# Patient Record
Sex: FEMALE | Race: WHITE | Employment: OTHER | URBAN - METROPOLITAN AREA
[De-identification: names, ages, dates, MRNs, and addresses within clinical notes are randomized per-mention and may not be internally consistent; named-entity substitution may affect disease eponyms.]

---

## 2020-01-01 ENCOUNTER — HOSPITAL ENCOUNTER (EMERGENCY)
Age: 70
Discharge: HOME | End: 2020-09-10
Attending: EMERGENCY MEDICINE
Payer: MEDICARE

## 2020-01-01 ENCOUNTER — TELEPHONE (OUTPATIENT)
Dept: CARDIOLOGY CLINIC | Age: 70
End: 2020-01-01

## 2020-01-01 ENCOUNTER — APPOINTMENT (OUTPATIENT)
Dept: GENERAL RADIOLOGY | Age: 70
DRG: 176 | End: 2020-01-01
Payer: MEDICARE

## 2020-01-01 ENCOUNTER — HOSPITAL ENCOUNTER (INPATIENT)
Age: 70
LOS: 4 days | Discharge: NURSING, REHAB OR LONG-TERM CARE | DRG: 377 | End: 2020-11-28
Attending: EMERGENCY MEDICINE | Admitting: NEUROMUSCULOSKELETAL MEDICINE & OMM
Payer: MEDICARE

## 2020-01-01 ENCOUNTER — APPOINTMENT (OUTPATIENT)
Dept: GENERAL RADIOLOGY | Age: 70
DRG: 872 | End: 2020-01-01
Payer: MEDICARE

## 2020-01-01 ENCOUNTER — APPOINTMENT (OUTPATIENT)
Dept: ULTRASOUND IMAGING | Age: 70
DRG: 377 | End: 2020-01-01
Payer: MEDICARE

## 2020-01-01 ENCOUNTER — HOSPITAL ENCOUNTER (EMERGENCY)
Age: 70
Discharge: HOME | End: 2020-12-13
Attending: EMERGENCY MEDICINE
Payer: MEDICARE

## 2020-01-01 ENCOUNTER — HOSPITAL ENCOUNTER (INPATIENT)
Age: 70
LOS: 6 days | Discharge: NURSING, REHAB OR LONG-TERM CARE | DRG: 872 | End: 2020-11-10
Attending: EMERGENCY MEDICINE | Admitting: NEUROMUSCULOSKELETAL MEDICINE & OMM
Payer: MEDICARE

## 2020-01-01 ENCOUNTER — APPOINTMENT (OUTPATIENT)
Dept: CT IMAGING | Age: 70
DRG: 176 | End: 2020-01-01
Payer: MEDICARE

## 2020-01-01 ENCOUNTER — APPOINTMENT (OUTPATIENT)
Dept: CT IMAGING | Age: 70
DRG: 377 | End: 2020-01-01
Payer: MEDICARE

## 2020-01-01 ENCOUNTER — HOSPITAL ENCOUNTER (INPATIENT)
Age: 70
LOS: 6 days | Discharge: NURSING, REHAB OR LONG-TERM CARE | DRG: 176 | End: 2020-12-08
Attending: EMERGENCY MEDICINE | Admitting: NEUROMUSCULOSKELETAL MEDICINE & OMM
Payer: MEDICARE

## 2020-01-01 ENCOUNTER — APPOINTMENT (OUTPATIENT)
Dept: CT IMAGING | Age: 70
End: 2020-01-01
Payer: MEDICARE

## 2020-01-01 ENCOUNTER — HOSPITAL ENCOUNTER (EMERGENCY)
Age: 70
Discharge: HOME | End: 2020-12-17
Attending: EMERGENCY MEDICINE
Payer: MEDICARE

## 2020-01-01 ENCOUNTER — APPOINTMENT (OUTPATIENT)
Dept: GENERAL RADIOLOGY | Age: 70
End: 2020-01-01
Payer: MEDICARE

## 2020-01-01 ENCOUNTER — APPOINTMENT (OUTPATIENT)
Dept: CT IMAGING | Age: 70
DRG: 872 | End: 2020-01-01
Payer: MEDICARE

## 2020-01-01 ENCOUNTER — APPOINTMENT (OUTPATIENT)
Dept: GENERAL RADIOLOGY | Age: 70
DRG: 377 | End: 2020-01-01
Payer: MEDICARE

## 2020-01-01 VITALS
SYSTOLIC BLOOD PRESSURE: 139 MMHG | DIASTOLIC BLOOD PRESSURE: 60 MMHG | TEMPERATURE: 98.1 F | OXYGEN SATURATION: 97 % | RESPIRATION RATE: 19 BRPM | HEART RATE: 77 BPM

## 2020-01-01 VITALS
TEMPERATURE: 97.9 F | HEIGHT: 65 IN | DIASTOLIC BLOOD PRESSURE: 63 MMHG | BODY MASS INDEX: 37.49 KG/M2 | WEIGHT: 225 LBS | HEART RATE: 98 BPM | RESPIRATION RATE: 16 BRPM | SYSTOLIC BLOOD PRESSURE: 165 MMHG | OXYGEN SATURATION: 100 %

## 2020-01-01 VITALS
TEMPERATURE: 97.6 F | RESPIRATION RATE: 18 BRPM | HEIGHT: 65 IN | OXYGEN SATURATION: 94 % | BODY MASS INDEX: 35.65 KG/M2 | SYSTOLIC BLOOD PRESSURE: 146 MMHG | WEIGHT: 214 LBS | DIASTOLIC BLOOD PRESSURE: 63 MMHG | HEART RATE: 79 BPM

## 2020-01-01 VITALS
SYSTOLIC BLOOD PRESSURE: 134 MMHG | HEART RATE: 84 BPM | BODY MASS INDEX: 31.92 KG/M2 | OXYGEN SATURATION: 98 % | DIASTOLIC BLOOD PRESSURE: 62 MMHG | WEIGHT: 191.6 LBS | RESPIRATION RATE: 18 BRPM | HEIGHT: 65 IN | TEMPERATURE: 96.9 F

## 2020-01-01 VITALS
HEART RATE: 94 BPM | DIASTOLIC BLOOD PRESSURE: 65 MMHG | OXYGEN SATURATION: 98 % | SYSTOLIC BLOOD PRESSURE: 148 MMHG | RESPIRATION RATE: 22 BRPM | TEMPERATURE: 98.7 F

## 2020-01-01 VITALS
TEMPERATURE: 97.6 F | HEART RATE: 88 BPM | RESPIRATION RATE: 18 BRPM | BODY MASS INDEX: 32.09 KG/M2 | SYSTOLIC BLOOD PRESSURE: 118 MMHG | WEIGHT: 192.6 LBS | HEIGHT: 65 IN | OXYGEN SATURATION: 96 % | DIASTOLIC BLOOD PRESSURE: 58 MMHG

## 2020-01-01 LAB
ABO/RH: NORMAL
ACINETOBACTER BAUMANNII BY PCR: NOT DETECTED
ADENOVIRUS BY PCR: NOT DETECTED
ADENOVIRUS BY PCR: NOT DETECTED
ALBUMIN SERPL-MCNC: 2.1 G/DL (ref 3.5–5.2)
ALBUMIN SERPL-MCNC: 2.2 G/DL (ref 3.5–5.2)
ALBUMIN SERPL-MCNC: 2.4 G/DL (ref 3.5–5.2)
ALBUMIN SERPL-MCNC: 2.5 G/DL (ref 3.5–5.2)
ALBUMIN SERPL-MCNC: 2.6 G/DL (ref 3.5–5.2)
ALBUMIN SERPL-MCNC: 2.6 G/DL (ref 3.5–5.2)
ALBUMIN SERPL-MCNC: 2.9 G/DL (ref 3.5–5.2)
ALBUMIN SERPL-MCNC: 3.4 G/DL (ref 3.5–5.2)
ALP BLD-CCNC: 106 U/L (ref 35–104)
ALP BLD-CCNC: 107 U/L (ref 35–104)
ALP BLD-CCNC: 127 U/L (ref 35–104)
ALP BLD-CCNC: 62 U/L (ref 35–104)
ALP BLD-CCNC: 62 U/L (ref 35–104)
ALP BLD-CCNC: 63 U/L (ref 35–104)
ALP BLD-CCNC: 65 U/L (ref 35–104)
ALP BLD-CCNC: 71 U/L (ref 35–104)
ALP BLD-CCNC: 73 U/L (ref 35–104)
ALP BLD-CCNC: 76 U/L (ref 35–104)
ALP BLD-CCNC: 77 U/L (ref 35–104)
ALP BLD-CCNC: 82 U/L (ref 35–104)
ALP BLD-CCNC: 86 U/L (ref 35–104)
ALP BLD-CCNC: 86 U/L (ref 35–104)
ALP BLD-CCNC: 91 U/L (ref 35–104)
ALP BLD-CCNC: 98 U/L (ref 35–104)
ALT SERPL-CCNC: 12 U/L (ref 0–32)
ALT SERPL-CCNC: 12 U/L (ref 0–32)
ALT SERPL-CCNC: 13 U/L (ref 0–32)
ALT SERPL-CCNC: 16 U/L (ref 0–32)
ALT SERPL-CCNC: 16 U/L (ref 0–32)
ALT SERPL-CCNC: 5 U/L (ref 0–32)
ALT SERPL-CCNC: 6 U/L (ref 0–32)
ALT SERPL-CCNC: 6 U/L (ref 0–32)
ALT SERPL-CCNC: <5 U/L (ref 0–32)
ANGLE (CLOT STRENGTH): 80.3 DEGREE (ref 59–74)
ANION GAP SERPL CALCULATED.3IONS-SCNC: 10 MMOL/L (ref 7–16)
ANION GAP SERPL CALCULATED.3IONS-SCNC: 11 MMOL/L (ref 7–16)
ANION GAP SERPL CALCULATED.3IONS-SCNC: 13 MMOL/L (ref 7–16)
ANION GAP SERPL CALCULATED.3IONS-SCNC: 13 MMOL/L (ref 7–16)
ANION GAP SERPL CALCULATED.3IONS-SCNC: 14 MMOL/L (ref 7–16)
ANION GAP SERPL CALCULATED.3IONS-SCNC: 15 MMOL/L (ref 7–16)
ANION GAP SERPL CALCULATED.3IONS-SCNC: 15 MMOL/L (ref 7–16)
ANION GAP SERPL CALCULATED.3IONS-SCNC: 17 MMOL/L (ref 7–16)
ANION GAP SERPL CALCULATED.3IONS-SCNC: 19 MMOL/L (ref 7–16)
ANION GAP SERPL CALCULATED.3IONS-SCNC: 25 MMOL/L (ref 7–16)
ANION GAP SERPL CALCULATED.3IONS-SCNC: 8 MMOL/L (ref 7–16)
ANION GAP SERPL CALCULATED.3IONS-SCNC: 9 MMOL/L (ref 7–16)
ANISOCYTOSIS: ABNORMAL
ANTIBODY SCREEN: NORMAL
APTT: 103.9 SEC (ref 24.5–35.1)
APTT: 22.4 SEC (ref 24.5–35.1)
APTT: 23.5 SEC (ref 24.5–35.1)
APTT: 23.7 SEC (ref 24.5–35.1)
APTT: 23.8 SEC (ref 24.5–35.1)
APTT: 24.6 SEC (ref 24.5–35.1)
APTT: 24.6 SEC (ref 24.5–35.1)
APTT: 24.8 SEC (ref 24.5–35.1)
APTT: 25.8 SEC (ref 24.5–35.1)
APTT: 25.9 SEC (ref 24.5–35.1)
APTT: 26 SEC (ref 24.5–35.1)
APTT: 30.7 SEC (ref 24.5–35.1)
APTT: 31.7 SEC (ref 24.5–35.1)
APTT: 61.2 SEC (ref 24.5–35.1)
APTT: 68.1 SEC (ref 24.5–35.1)
APTT: 89.4 SEC (ref 24.5–35.1)
APTT: <20 SEC (ref 24.5–35.1)
AST SERPL-CCNC: 13 U/L (ref 0–31)
AST SERPL-CCNC: 14 U/L (ref 0–31)
AST SERPL-CCNC: 17 U/L (ref 0–31)
AST SERPL-CCNC: 18 U/L (ref 0–31)
AST SERPL-CCNC: 19 U/L (ref 0–31)
AST SERPL-CCNC: 20 U/L (ref 0–31)
AST SERPL-CCNC: 20 U/L (ref 0–31)
AST SERPL-CCNC: 23 U/L (ref 0–31)
AST SERPL-CCNC: 30 U/L (ref 0–31)
AST SERPL-CCNC: 34 U/L (ref 0–31)
AST SERPL-CCNC: 66 U/L (ref 0–31)
BACTERIA: ABNORMAL /HPF
BASOPHILS ABSOLUTE: 0 E9/L (ref 0–0.2)
BASOPHILS ABSOLUTE: 0 E9/L (ref 0–0.2)
BASOPHILS ABSOLUTE: 0.01 E9/L (ref 0–0.2)
BASOPHILS ABSOLUTE: 0.02 E9/L (ref 0–0.2)
BASOPHILS ABSOLUTE: 0.03 E9/L (ref 0–0.2)
BASOPHILS ABSOLUTE: 0.04 E9/L (ref 0–0.2)
BASOPHILS ABSOLUTE: 0.06 E9/L (ref 0–0.2)
BASOPHILS ABSOLUTE: 0.07 E9/L (ref 0–0.2)
BASOPHILS RELATIVE PERCENT: 0.1 % (ref 0–2)
BASOPHILS RELATIVE PERCENT: 0.2 % (ref 0–2)
BASOPHILS RELATIVE PERCENT: 0.3 % (ref 0–2)
BASOPHILS RELATIVE PERCENT: 0.4 % (ref 0–2)
BASOPHILS RELATIVE PERCENT: 0.4 % (ref 0–2)
BASOPHILS RELATIVE PERCENT: 0.5 % (ref 0–2)
BASOPHILS RELATIVE PERCENT: 0.6 % (ref 0–2)
BASOPHILS RELATIVE PERCENT: 0.6 % (ref 0–2)
BETA-HYDROXYBUTYRATE: 0.16 MMOL/L (ref 0.02–0.27)
BETA-HYDROXYBUTYRATE: 3.45 MMOL/L (ref 0.02–0.27)
BILIRUB SERPL-MCNC: 0.2 MG/DL (ref 0–1.2)
BILIRUB SERPL-MCNC: 0.3 MG/DL (ref 0–1.2)
BILIRUB SERPL-MCNC: 0.3 MG/DL (ref 0–1.2)
BILIRUB SERPL-MCNC: 0.4 MG/DL (ref 0–1.2)
BILIRUB SERPL-MCNC: 0.5 MG/DL (ref 0–1.2)
BILIRUB SERPL-MCNC: <0.2 MG/DL (ref 0–1.2)
BILIRUBIN DIRECT: <0.2 MG/DL (ref 0–0.3)
BILIRUBIN URINE: NEGATIVE
BILIRUBIN, INDIRECT: ABNORMAL MG/DL (ref 0–1)
BLASTS RELATIVE PERCENT: 0.9 % (ref 0–0)
BLOOD BANK DISPENSE STATUS: NORMAL
BLOOD BANK PRODUCT CODE: NORMAL
BLOOD CULTURE, ROUTINE: ABNORMAL
BLOOD CULTURE, ROUTINE: NORMAL
BLOOD, URINE: ABNORMAL
BLOOD, URINE: ABNORMAL
BLOOD, URINE: NEGATIVE
BLOOD, URINE: NEGATIVE
BODY FLUID CULTURE, STERILE: NORMAL
BORDETELLA PARAPERTUSSIS BY PCR: NOT DETECTED
BORDETELLA PARAPERTUSSIS BY PCR: NOT DETECTED
BORDETELLA PERTUSSIS BY PCR: NOT DETECTED
BORDETELLA PERTUSSIS BY PCR: NOT DETECTED
BOTTLE TYPE: ABNORMAL
BPU ID: NORMAL
BUN BLDV-MCNC: 10 MG/DL (ref 8–23)
BUN BLDV-MCNC: 10 MG/DL (ref 8–23)
BUN BLDV-MCNC: 11 MG/DL (ref 8–23)
BUN BLDV-MCNC: 13 MG/DL (ref 8–23)
BUN BLDV-MCNC: 15 MG/DL (ref 8–23)
BUN BLDV-MCNC: 15 MG/DL (ref 8–23)
BUN BLDV-MCNC: 16 MG/DL (ref 8–23)
BUN BLDV-MCNC: 18 MG/DL (ref 8–23)
BUN BLDV-MCNC: 19 MG/DL (ref 8–23)
BUN BLDV-MCNC: 19 MG/DL (ref 8–23)
BUN BLDV-MCNC: 23 MG/DL (ref 8–23)
BUN BLDV-MCNC: 27 MG/DL (ref 8–23)
BUN BLDV-MCNC: 28 MG/DL (ref 8–23)
BUN BLDV-MCNC: 31 MG/DL (ref 8–23)
BUN BLDV-MCNC: 31 MG/DL (ref 8–23)
BUN BLDV-MCNC: 7 MG/DL (ref 8–23)
BUN BLDV-MCNC: 8 MG/DL (ref 8–23)
BUN BLDV-MCNC: 9 MG/DL (ref 8–23)
BURR CELLS: ABNORMAL
BURR CELLS: ABNORMAL
C-REACTIVE PROTEIN: 2.4 MG/DL (ref 0–0.4)
CALCIUM SERPL-MCNC: 7.6 MG/DL (ref 8.6–10.2)
CALCIUM SERPL-MCNC: 8 MG/DL (ref 8.6–10.2)
CALCIUM SERPL-MCNC: 8.1 MG/DL (ref 8.6–10.2)
CALCIUM SERPL-MCNC: 8.1 MG/DL (ref 8.6–10.2)
CALCIUM SERPL-MCNC: 8.3 MG/DL (ref 8.6–10.2)
CALCIUM SERPL-MCNC: 8.3 MG/DL (ref 8.6–10.2)
CALCIUM SERPL-MCNC: 8.4 MG/DL (ref 8.6–10.2)
CALCIUM SERPL-MCNC: 8.5 MG/DL (ref 8.6–10.2)
CALCIUM SERPL-MCNC: 8.5 MG/DL (ref 8.6–10.2)
CALCIUM SERPL-MCNC: 8.6 MG/DL (ref 8.6–10.2)
CALCIUM SERPL-MCNC: 8.8 MG/DL (ref 8.6–10.2)
CALCIUM SERPL-MCNC: 9 MG/DL (ref 8.6–10.2)
CALCIUM SERPL-MCNC: 9 MG/DL (ref 8.6–10.2)
CALCIUM SERPL-MCNC: 9.1 MG/DL (ref 8.6–10.2)
CALCIUM SERPL-MCNC: 9.1 MG/DL (ref 8.6–10.2)
CALCIUM SERPL-MCNC: 9.2 MG/DL (ref 8.6–10.2)
CALCIUM SERPL-MCNC: 9.2 MG/DL (ref 8.6–10.2)
CALCIUM SERPL-MCNC: 9.7 MG/DL (ref 8.6–10.2)
CANDIDA ALBICANS BY PCR: NOT DETECTED
CANDIDA GLABRATA BY PCR: NOT DETECTED
CANDIDA KRUSEI BY PCR: NOT DETECTED
CANDIDA PARAPSILOSIS BY PCR: NOT DETECTED
CANDIDA TROPICALIS BY PCR: NOT DETECTED
CHLAMYDOPHILIA PNEUMONIAE BY PCR: NOT DETECTED
CHLAMYDOPHILIA PNEUMONIAE BY PCR: NOT DETECTED
CHLORIDE BLD-SCNC: 100 MMOL/L (ref 98–107)
CHLORIDE BLD-SCNC: 101 MMOL/L (ref 98–107)
CHLORIDE BLD-SCNC: 103 MMOL/L (ref 98–107)
CHLORIDE BLD-SCNC: 104 MMOL/L (ref 98–107)
CHLORIDE BLD-SCNC: 104 MMOL/L (ref 98–107)
CHLORIDE BLD-SCNC: 105 MMOL/L (ref 98–107)
CHLORIDE BLD-SCNC: 105 MMOL/L (ref 98–107)
CHLORIDE BLD-SCNC: 106 MMOL/L (ref 98–107)
CHLORIDE BLD-SCNC: 107 MMOL/L (ref 98–107)
CHLORIDE BLD-SCNC: 112 MMOL/L (ref 98–107)
CHLORIDE BLD-SCNC: 92 MMOL/L (ref 98–107)
CHLORIDE BLD-SCNC: 99 MMOL/L (ref 98–107)
CHLORIDE URINE RANDOM: 32 MMOL/L
CHLORIDE URINE RANDOM: 41 MMOL/L
CHP ED QC CHECK: NORMAL
CHP ED QC CHECK: YES
CLARITY: ABNORMAL
CLARITY: CLEAR
CO2: 15 MMOL/L (ref 22–29)
CO2: 17 MMOL/L (ref 22–29)
CO2: 20 MMOL/L (ref 22–29)
CO2: 21 MMOL/L (ref 22–29)
CO2: 21 MMOL/L (ref 22–29)
CO2: 22 MMOL/L (ref 22–29)
CO2: 23 MMOL/L (ref 22–29)
CO2: 23 MMOL/L (ref 22–29)
CO2: 25 MMOL/L (ref 22–29)
CO2: 26 MMOL/L (ref 22–29)
CO2: 26 MMOL/L (ref 22–29)
CO2: 27 MMOL/L (ref 22–29)
CO2: 27 MMOL/L (ref 22–29)
CO2: 28 MMOL/L (ref 22–29)
CO2: 29 MMOL/L (ref 22–29)
CO2: 30 MMOL/L (ref 22–29)
COLOR: YELLOW
CORONAVIRUS 229E BY PCR: NOT DETECTED
CORONAVIRUS 229E BY PCR: NOT DETECTED
CORONAVIRUS HKU1 BY PCR: NOT DETECTED
CORONAVIRUS HKU1 BY PCR: NOT DETECTED
CORONAVIRUS NL63 BY PCR: NOT DETECTED
CORONAVIRUS NL63 BY PCR: NOT DETECTED
CORONAVIRUS OC43 BY PCR: NOT DETECTED
CORONAVIRUS OC43 BY PCR: NOT DETECTED
CREAT SERPL-MCNC: 0.5 MG/DL (ref 0.5–1)
CREAT SERPL-MCNC: 0.5 MG/DL (ref 0.5–1)
CREAT SERPL-MCNC: 0.6 MG/DL (ref 0.5–1)
CREAT SERPL-MCNC: 0.7 MG/DL (ref 0.5–1)
CREAT SERPL-MCNC: 0.8 MG/DL (ref 0.5–1)
CREAT SERPL-MCNC: 0.9 MG/DL (ref 0.5–1)
CREAT SERPL-MCNC: 1 MG/DL (ref 0.5–1)
CREAT SERPL-MCNC: 1.1 MG/DL (ref 0.5–1)
CREAT SERPL-MCNC: 1.2 MG/DL (ref 0.5–1)
CREATININE URINE: 21 MG/DL (ref 29–226)
CREATININE URINE: 83 MG/DL (ref 29–226)
CULTURE, BLOOD 2: ABNORMAL
CULTURE, BLOOD 2: NORMAL
D DIMER: 1419 NG/ML DDU
D DIMER: 1725 NG/ML DDU
D DIMER: 484 NG/ML DDU
D DIMER: 565 NG/ML DDU
D DIMER: 766 NG/ML DDU
DESCRIPTION BLOOD BANK: NORMAL
EKG ATRIAL RATE: 108 BPM
EKG ATRIAL RATE: 121 BPM
EKG ATRIAL RATE: 71 BPM
EKG ATRIAL RATE: 82 BPM
EKG ATRIAL RATE: 90 BPM
EKG P AXIS: 3 DEGREES
EKG P AXIS: 37 DEGREES
EKG P AXIS: 46 DEGREES
EKG P AXIS: 48 DEGREES
EKG P AXIS: 49 DEGREES
EKG P-R INTERVAL: 134 MS
EKG P-R INTERVAL: 136 MS
EKG P-R INTERVAL: 148 MS
EKG P-R INTERVAL: 154 MS
EKG P-R INTERVAL: 158 MS
EKG Q-T INTERVAL: 314 MS
EKG Q-T INTERVAL: 326 MS
EKG Q-T INTERVAL: 362 MS
EKG Q-T INTERVAL: 404 MS
EKG Q-T INTERVAL: 456 MS
EKG QRS DURATION: 66 MS
EKG QRS DURATION: 70 MS
EKG QRS DURATION: 70 MS
EKG QRS DURATION: 80 MS
EKG QRS DURATION: 80 MS
EKG QTC CALCULATION (BAZETT): 422 MS
EKG QTC CALCULATION (BAZETT): 436 MS
EKG QTC CALCULATION (BAZETT): 439 MS
EKG QTC CALCULATION (BAZETT): 445 MS
EKG QTC CALCULATION (BAZETT): 557 MS
EKG R AXIS: -15 DEGREES
EKG R AXIS: 0 DEGREES
EKG R AXIS: 11 DEGREES
EKG R AXIS: 29 DEGREES
EKG R AXIS: 9 DEGREES
EKG T AXIS: -61 DEGREES
EKG T AXIS: 120 DEGREES
EKG T AXIS: 37 DEGREES
EKG T AXIS: 51 DEGREES
EKG T AXIS: 89 DEGREES
EKG VENTRICULAR RATE: 108 BPM
EKG VENTRICULAR RATE: 121 BPM
EKG VENTRICULAR RATE: 71 BPM
EKG VENTRICULAR RATE: 82 BPM
EKG VENTRICULAR RATE: 90 BPM
ENTEROBACTER CLOACAE COMPLEX BY PCR: NOT DETECTED
ENTEROBACTERALES BY PCR: NOT DETECTED
ENTEROCOCCUS BY PCR: NOT DETECTED
EOSINOPHILS ABSOLUTE: 0.02 E9/L (ref 0.05–0.5)
EOSINOPHILS ABSOLUTE: 0.04 E9/L (ref 0.05–0.5)
EOSINOPHILS ABSOLUTE: 0.05 E9/L (ref 0.05–0.5)
EOSINOPHILS ABSOLUTE: 0.06 E9/L (ref 0.05–0.5)
EOSINOPHILS ABSOLUTE: 0.13 E9/L (ref 0.05–0.5)
EOSINOPHILS ABSOLUTE: 0.13 E9/L (ref 0.05–0.5)
EOSINOPHILS ABSOLUTE: 0.14 E9/L (ref 0.05–0.5)
EOSINOPHILS ABSOLUTE: 0.19 E9/L (ref 0.05–0.5)
EOSINOPHILS ABSOLUTE: 0.21 E9/L (ref 0.05–0.5)
EOSINOPHILS ABSOLUTE: 0.21 E9/L (ref 0.05–0.5)
EOSINOPHILS ABSOLUTE: 0.24 E9/L (ref 0.05–0.5)
EOSINOPHILS ABSOLUTE: 0.27 E9/L (ref 0.05–0.5)
EOSINOPHILS ABSOLUTE: 0.28 E9/L (ref 0.05–0.5)
EOSINOPHILS ABSOLUTE: 0.3 E9/L (ref 0.05–0.5)
EOSINOPHILS ABSOLUTE: 0.34 E9/L (ref 0.05–0.5)
EOSINOPHILS ABSOLUTE: 0.36 E9/L (ref 0.05–0.5)
EOSINOPHILS ABSOLUTE: 0.4 E9/L (ref 0.05–0.5)
EOSINOPHILS ABSOLUTE: 0.46 E9/L (ref 0.05–0.5)
EOSINOPHILS ABSOLUTE: 0.47 E9/L (ref 0.05–0.5)
EOSINOPHILS ABSOLUTE: 0.69 E9/L (ref 0.05–0.5)
EOSINOPHILS RELATIVE PERCENT: 0.2 % (ref 0–6)
EOSINOPHILS RELATIVE PERCENT: 0.2 % (ref 0–6)
EOSINOPHILS RELATIVE PERCENT: 0.4 % (ref 0–6)
EOSINOPHILS RELATIVE PERCENT: 0.5 % (ref 0–6)
EOSINOPHILS RELATIVE PERCENT: 0.9 % (ref 0–6)
EOSINOPHILS RELATIVE PERCENT: 0.9 % (ref 0–6)
EOSINOPHILS RELATIVE PERCENT: 1 % (ref 0–6)
EOSINOPHILS RELATIVE PERCENT: 1.2 % (ref 0–6)
EOSINOPHILS RELATIVE PERCENT: 1.9 % (ref 0–6)
EOSINOPHILS RELATIVE PERCENT: 2.3 % (ref 0–6)
EOSINOPHILS RELATIVE PERCENT: 2.4 % (ref 0–6)
EOSINOPHILS RELATIVE PERCENT: 2.4 % (ref 0–6)
EOSINOPHILS RELATIVE PERCENT: 2.8 % (ref 0–6)
EOSINOPHILS RELATIVE PERCENT: 3.2 % (ref 0–6)
EOSINOPHILS RELATIVE PERCENT: 3.2 % (ref 0–6)
EOSINOPHILS RELATIVE PERCENT: 3.8 % (ref 0–6)
EOSINOPHILS RELATIVE PERCENT: 3.9 % (ref 0–6)
EOSINOPHILS RELATIVE PERCENT: 4 % (ref 0–6)
EOSINOPHILS RELATIVE PERCENT: 4.1 % (ref 0–6)
EOSINOPHILS RELATIVE PERCENT: 4.4 % (ref 0–6)
EOSINOPHILS RELATIVE PERCENT: 4.8 % (ref 0–6)
EOSINOPHILS RELATIVE PERCENT: 6.2 % (ref 0–6)
EPITHELIAL CELLS, UA: ABNORMAL /HPF
EPL-TEG: 0 % (ref 0–15)
ESCHERICHIA COLI BY PCR: NOT DETECTED
FERRITIN: 774 NG/ML
FIBRINOGEN: 412 MG/DL (ref 225–540)
FIBRINOGEN: 438 MG/DL (ref 225–540)
FIBRINOGEN: 443 MG/DL (ref 225–540)
FIBRINOGEN: 450 MG/DL (ref 225–540)
FIBRINOGEN: 457 MG/DL (ref 225–540)
FLUID TYPE: NORMAL
G-TEG: 17.9 K D/SC (ref 4.5–11)
GFR AFRICAN AMERICAN: 53
GFR AFRICAN AMERICAN: 59
GFR AFRICAN AMERICAN: >60
GFR NON-AFRICAN AMERICAN: 44 ML/MIN/1.73
GFR NON-AFRICAN AMERICAN: 49 ML/MIN/1.73
GFR NON-AFRICAN AMERICAN: 55 ML/MIN/1.73
GFR NON-AFRICAN AMERICAN: >60 ML/MIN/1.73
GLUCOSE BLD-MCNC: 112 MG/DL (ref 74–99)
GLUCOSE BLD-MCNC: 116 MG/DL (ref 74–99)
GLUCOSE BLD-MCNC: 117 MG/DL (ref 74–99)
GLUCOSE BLD-MCNC: 125 MG/DL (ref 74–99)
GLUCOSE BLD-MCNC: 156 MG/DL (ref 74–99)
GLUCOSE BLD-MCNC: 161 MG/DL (ref 74–99)
GLUCOSE BLD-MCNC: 224 MG/DL (ref 74–99)
GLUCOSE BLD-MCNC: 244 MG/DL (ref 74–99)
GLUCOSE BLD-MCNC: 255 MG/DL (ref 74–99)
GLUCOSE BLD-MCNC: 278 MG/DL (ref 74–99)
GLUCOSE BLD-MCNC: 284 MG/DL (ref 74–99)
GLUCOSE BLD-MCNC: 299 MG/DL (ref 74–99)
GLUCOSE BLD-MCNC: 309 MG/DL (ref 74–99)
GLUCOSE BLD-MCNC: 314 MG/DL (ref 74–99)
GLUCOSE BLD-MCNC: 314 MG/DL (ref 74–99)
GLUCOSE BLD-MCNC: 325 MG/DL
GLUCOSE BLD-MCNC: 372 MG/DL (ref 74–99)
GLUCOSE BLD-MCNC: 398 MG/DL
GLUCOSE BLD-MCNC: 405 MG/DL (ref 74–99)
GLUCOSE BLD-MCNC: 414 MG/DL (ref 74–99)
GLUCOSE BLD-MCNC: 65 MG/DL (ref 74–99)
GLUCOSE BLD-MCNC: 98 MG/DL (ref 74–99)
GLUCOSE URINE: 250 MG/DL
GLUCOSE URINE: >=1000 MG/DL
GLUCOSE URINE: >=1000 MG/DL
GLUCOSE URINE: NEGATIVE MG/DL
GLUCOSE, FLUID: 304 MG/DL
GRAM STAIN ORDERABLE: NORMAL
GRAM STAIN RESULT: NORMAL
HAEMOPHILUS INFLUENZAE BY PCR: NOT DETECTED
HBA1C MFR BLD: 6.9 % (ref 4–5.6)
HBA1C MFR BLD: 9.4 % (ref 4–5.6)
HCT VFR BLD CALC: 23.1 % (ref 34–48)
HCT VFR BLD CALC: 23.2 % (ref 34–48)
HCT VFR BLD CALC: 23.3 % (ref 34–48)
HCT VFR BLD CALC: 23.5 % (ref 34–48)
HCT VFR BLD CALC: 23.6 % (ref 34–48)
HCT VFR BLD CALC: 23.7 % (ref 34–48)
HCT VFR BLD CALC: 23.8 % (ref 34–48)
HCT VFR BLD CALC: 24.2 % (ref 34–48)
HCT VFR BLD CALC: 24.3 % (ref 34–48)
HCT VFR BLD CALC: 24.4 % (ref 34–48)
HCT VFR BLD CALC: 24.4 % (ref 34–48)
HCT VFR BLD CALC: 24.5 % (ref 34–48)
HCT VFR BLD CALC: 24.6 % (ref 34–48)
HCT VFR BLD CALC: 24.9 % (ref 34–48)
HCT VFR BLD CALC: 25.1 % (ref 34–48)
HCT VFR BLD CALC: 25.1 % (ref 34–48)
HCT VFR BLD CALC: 25.7 % (ref 34–48)
HCT VFR BLD CALC: 26.1 % (ref 34–48)
HCT VFR BLD CALC: 26.2 % (ref 34–48)
HCT VFR BLD CALC: 26.7 % (ref 34–48)
HCT VFR BLD CALC: 27.2 % (ref 34–48)
HCT VFR BLD CALC: 28.1 % (ref 34–48)
HCT VFR BLD CALC: 28.2 % (ref 34–48)
HCT VFR BLD CALC: 31.3 % (ref 34–48)
HCT VFR BLD CALC: 32 % (ref 34–48)
HCT VFR BLD CALC: 32.9 % (ref 34–48)
HCT VFR BLD CALC: 33.1 % (ref 34–48)
HCT VFR BLD CALC: 33.1 % (ref 34–48)
HCT VFR BLD CALC: 33.5 % (ref 34–48)
HCT VFR BLD CALC: 36.6 % (ref 34–48)
HCT VFR BLD CALC: 37.9 % (ref 34–48)
HCT VFR BLD CALC: 42.6 % (ref 34–48)
HEMOGLOBIN: 10 G/DL (ref 11.5–15.5)
HEMOGLOBIN: 10.3 G/DL (ref 11.5–15.5)
HEMOGLOBIN: 11.5 G/DL (ref 11.5–15.5)
HEMOGLOBIN: 11.5 G/DL (ref 11.5–15.5)
HEMOGLOBIN: 12.6 G/DL (ref 11.5–15.5)
HEMOGLOBIN: 6.7 G/DL (ref 11.5–15.5)
HEMOGLOBIN: 6.9 G/DL (ref 11.5–15.5)
HEMOGLOBIN: 7.2 G/DL (ref 11.5–15.5)
HEMOGLOBIN: 7.3 G/DL (ref 11.5–15.5)
HEMOGLOBIN: 7.5 G/DL (ref 11.5–15.5)
HEMOGLOBIN: 7.6 G/DL (ref 11.5–15.5)
HEMOGLOBIN: 7.7 G/DL (ref 11.5–15.5)
HEMOGLOBIN: 7.7 G/DL (ref 11.5–15.5)
HEMOGLOBIN: 7.8 G/DL (ref 11.5–15.5)
HEMOGLOBIN: 7.9 G/DL (ref 11.5–15.5)
HEMOGLOBIN: 8 G/DL (ref 11.5–15.5)
HEMOGLOBIN: 8 G/DL (ref 11.5–15.5)
HEMOGLOBIN: 8.1 G/DL (ref 11.5–15.5)
HEMOGLOBIN: 8.2 G/DL (ref 11.5–15.5)
HEMOGLOBIN: 8.3 G/DL (ref 11.5–15.5)
HEMOGLOBIN: 8.3 G/DL (ref 11.5–15.5)
HEMOGLOBIN: 8.6 G/DL (ref 11.5–15.5)
HEMOGLOBIN: 8.8 G/DL (ref 11.5–15.5)
HEMOGLOBIN: 9.3 G/DL (ref 11.5–15.5)
HEMOGLOBIN: 9.4 G/DL (ref 11.5–15.5)
HEMOGLOBIN: 9.5 G/DL (ref 11.5–15.5)
HEMOGLOBIN: 9.9 G/DL (ref 11.5–15.5)
HUMAN METAPNEUMOVIRUS BY PCR: NOT DETECTED
HUMAN METAPNEUMOVIRUS BY PCR: NOT DETECTED
HUMAN RHINOVIRUS/ENTEROVIRUS BY PCR: NOT DETECTED
HUMAN RHINOVIRUS/ENTEROVIRUS BY PCR: NOT DETECTED
HYALINE CASTS: ABNORMAL /LPF (ref 0–2)
HYPOCHROMIA: ABNORMAL
HYPOCHROMIA: ABNORMAL
IMMATURE GRANULOCYTES #: 0.05 E9/L
IMMATURE GRANULOCYTES #: 0.09 E9/L
IMMATURE GRANULOCYTES #: 0.11 E9/L
IMMATURE GRANULOCYTES #: 0.12 E9/L
IMMATURE GRANULOCYTES #: 0.15 E9/L
IMMATURE GRANULOCYTES #: 0.16 E9/L
IMMATURE GRANULOCYTES #: 0.18 E9/L
IMMATURE GRANULOCYTES #: 0.18 E9/L
IMMATURE GRANULOCYTES #: 0.2 E9/L
IMMATURE GRANULOCYTES #: 0.2 E9/L
IMMATURE GRANULOCYTES #: 0.21 E9/L
IMMATURE GRANULOCYTES #: 0.22 E9/L
IMMATURE GRANULOCYTES #: 0.25 E9/L
IMMATURE GRANULOCYTES #: 0.26 E9/L
IMMATURE GRANULOCYTES #: 0.27 E9/L
IMMATURE GRANULOCYTES #: 0.3 E9/L
IMMATURE GRANULOCYTES #: 0.32 E9/L
IMMATURE GRANULOCYTES %: 0.4 % (ref 0–5)
IMMATURE GRANULOCYTES %: 0.8 % (ref 0–5)
IMMATURE GRANULOCYTES %: 0.9 % (ref 0–5)
IMMATURE GRANULOCYTES %: 0.9 % (ref 0–5)
IMMATURE GRANULOCYTES %: 1 % (ref 0–5)
IMMATURE GRANULOCYTES %: 1.4 % (ref 0–5)
IMMATURE GRANULOCYTES %: 1.5 % (ref 0–5)
IMMATURE GRANULOCYTES %: 1.7 % (ref 0–5)
IMMATURE GRANULOCYTES %: 1.8 % (ref 0–5)
IMMATURE GRANULOCYTES %: 1.8 % (ref 0–5)
IMMATURE GRANULOCYTES %: 2 % (ref 0–5)
IMMATURE GRANULOCYTES %: 2.1 % (ref 0–5)
IMMATURE GRANULOCYTES %: 2.3 % (ref 0–5)
IMMATURE GRANULOCYTES %: 2.3 % (ref 0–5)
IMMATURE GRANULOCYTES %: 2.6 % (ref 0–5)
IMMATURE GRANULOCYTES %: 3.6 % (ref 0–5)
IMMATURE GRANULOCYTES %: 4.1 % (ref 0–5)
INFLUENZA A BY PCR: NOT DETECTED
INFLUENZA A BY PCR: NOT DETECTED
INFLUENZA B BY PCR: NOT DETECTED
INFLUENZA B BY PCR: NOT DETECTED
INR BLD: 1
INR BLD: 1
INR BLD: 1.1
INR BLD: 1.1
INR BLD: 1.2
INR BLD: 2.4
INR BLD: 2.4
K (CLOTTING TIME): 0.8 MIN (ref 1–3)
KETONES, URINE: 40 MG/DL
KETONES, URINE: ABNORMAL MG/DL
KETONES, URINE: ABNORMAL MG/DL
KETONES, URINE: NEGATIVE MG/DL
KLEBSIELLA OXYTOCA BY PCR: NOT DETECTED
KLEBSIELLA PNEUMONIAE GROUP BY PCR: NOT DETECTED
LACTIC ACID, SEPSIS: 1.2 MMOL/L (ref 0.5–1.9)
LACTIC ACID: 1.8 MMOL/L (ref 0.5–2.2)
LACTIC ACID: 10.6 MMOL/L (ref 0.5–2.2)
LACTIC ACID: 3.1 MMOL/L (ref 0.5–2.2)
LACTIC ACID: 8 MMOL/L (ref 0.5–2.2)
LEUKOCYTE ESTERASE, URINE: ABNORMAL
LEUKOCYTE ESTERASE, URINE: NEGATIVE
LIPASE: 22 U/L (ref 13–60)
LISTERIA MONOCYTOGENES BY PCR: NOT DETECTED
LV EF: 60 %
LVEF MODALITY: NORMAL
LY30 (FIBRINOLYSIS): 0 % (ref 0–8)
LYMPHOCYTES ABSOLUTE: 0.8 E9/L (ref 1.5–4)
LYMPHOCYTES ABSOLUTE: 0.86 E9/L (ref 1.5–4)
LYMPHOCYTES ABSOLUTE: 0.98 E9/L (ref 1.5–4)
LYMPHOCYTES ABSOLUTE: 1.11 E9/L (ref 1.5–4)
LYMPHOCYTES ABSOLUTE: 1.12 E9/L (ref 1.5–4)
LYMPHOCYTES ABSOLUTE: 1.28 E9/L (ref 1.5–4)
LYMPHOCYTES ABSOLUTE: 1.34 E9/L (ref 1.5–4)
LYMPHOCYTES ABSOLUTE: 1.35 E9/L (ref 1.5–4)
LYMPHOCYTES ABSOLUTE: 1.37 E9/L (ref 1.5–4)
LYMPHOCYTES ABSOLUTE: 1.5 E9/L (ref 1.5–4)
LYMPHOCYTES ABSOLUTE: 1.53 E9/L (ref 1.5–4)
LYMPHOCYTES ABSOLUTE: 1.53 E9/L (ref 1.5–4)
LYMPHOCYTES ABSOLUTE: 1.54 E9/L (ref 1.5–4)
LYMPHOCYTES ABSOLUTE: 1.62 E9/L (ref 1.5–4)
LYMPHOCYTES ABSOLUTE: 1.69 E9/L (ref 1.5–4)
LYMPHOCYTES ABSOLUTE: 1.76 E9/L (ref 1.5–4)
LYMPHOCYTES ABSOLUTE: 1.78 E9/L (ref 1.5–4)
LYMPHOCYTES ABSOLUTE: 1.81 E9/L (ref 1.5–4)
LYMPHOCYTES ABSOLUTE: 1.84 E9/L (ref 1.5–4)
LYMPHOCYTES ABSOLUTE: 1.89 E9/L (ref 1.5–4)
LYMPHOCYTES ABSOLUTE: 1.94 E9/L (ref 1.5–4)
LYMPHOCYTES ABSOLUTE: 3.01 E9/L (ref 1.5–4)
LYMPHOCYTES RELATIVE PERCENT: 10.2 % (ref 20–42)
LYMPHOCYTES RELATIVE PERCENT: 11.2 % (ref 20–42)
LYMPHOCYTES RELATIVE PERCENT: 11.4 % (ref 20–42)
LYMPHOCYTES RELATIVE PERCENT: 11.4 % (ref 20–42)
LYMPHOCYTES RELATIVE PERCENT: 12.7 % (ref 20–42)
LYMPHOCYTES RELATIVE PERCENT: 14.3 % (ref 20–42)
LYMPHOCYTES RELATIVE PERCENT: 15.1 % (ref 20–42)
LYMPHOCYTES RELATIVE PERCENT: 15.2 % (ref 20–42)
LYMPHOCYTES RELATIVE PERCENT: 15.6 % (ref 20–42)
LYMPHOCYTES RELATIVE PERCENT: 16.3 % (ref 20–42)
LYMPHOCYTES RELATIVE PERCENT: 16.9 % (ref 20–42)
LYMPHOCYTES RELATIVE PERCENT: 17.8 % (ref 20–42)
LYMPHOCYTES RELATIVE PERCENT: 18.3 % (ref 20–42)
LYMPHOCYTES RELATIVE PERCENT: 18.5 % (ref 20–42)
LYMPHOCYTES RELATIVE PERCENT: 21.4 % (ref 20–42)
LYMPHOCYTES RELATIVE PERCENT: 24.1 % (ref 20–42)
LYMPHOCYTES RELATIVE PERCENT: 27.2 % (ref 20–42)
LYMPHOCYTES RELATIVE PERCENT: 6.1 % (ref 20–42)
LYMPHOCYTES RELATIVE PERCENT: 6.7 % (ref 20–42)
LYMPHOCYTES RELATIVE PERCENT: 7.2 % (ref 20–42)
LYMPHOCYTES RELATIVE PERCENT: 7.4 % (ref 20–42)
LYMPHOCYTES RELATIVE PERCENT: 9.1 % (ref 20–42)
MA (MAX AMPLITUDE): 78.1 MM (ref 50–70)
MAGNESIUM: 1.3 MG/DL (ref 1.6–2.6)
MAGNESIUM: 1.4 MG/DL (ref 1.6–2.6)
MAGNESIUM: 1.5 MG/DL (ref 1.6–2.6)
MAGNESIUM: 1.5 MG/DL (ref 1.6–2.6)
MAGNESIUM: 1.6 MG/DL (ref 1.6–2.6)
MAGNESIUM: 1.7 MG/DL (ref 1.6–2.6)
MAGNESIUM: 1.7 MG/DL (ref 1.6–2.6)
MAGNESIUM: 1.8 MG/DL (ref 1.6–2.6)
MCH RBC QN AUTO: 25.2 PG (ref 26–35)
MCH RBC QN AUTO: 25.3 PG (ref 26–35)
MCH RBC QN AUTO: 25.3 PG (ref 26–35)
MCH RBC QN AUTO: 25.5 PG (ref 26–35)
MCH RBC QN AUTO: 25.5 PG (ref 26–35)
MCH RBC QN AUTO: 25.6 PG (ref 26–35)
MCH RBC QN AUTO: 25.7 PG (ref 26–35)
MCH RBC QN AUTO: 25.9 PG (ref 26–35)
MCH RBC QN AUTO: 25.9 PG (ref 26–35)
MCH RBC QN AUTO: 26.4 PG (ref 26–35)
MCH RBC QN AUTO: 26.5 PG (ref 26–35)
MCH RBC QN AUTO: 27 PG (ref 26–35)
MCH RBC QN AUTO: 27.1 PG (ref 26–35)
MCH RBC QN AUTO: 27.3 PG (ref 26–35)
MCH RBC QN AUTO: 27.4 PG (ref 26–35)
MCH RBC QN AUTO: 27.6 PG (ref 26–35)
MCH RBC QN AUTO: 27.7 PG (ref 26–35)
MCH RBC QN AUTO: 27.9 PG (ref 26–35)
MCH RBC QN AUTO: 28.4 PG (ref 26–35)
MCHC RBC AUTO-ENTMCNC: 28.2 % (ref 32–34.5)
MCHC RBC AUTO-ENTMCNC: 28.8 % (ref 32–34.5)
MCHC RBC AUTO-ENTMCNC: 28.9 % (ref 32–34.5)
MCHC RBC AUTO-ENTMCNC: 29.1 % (ref 32–34.5)
MCHC RBC AUTO-ENTMCNC: 29.6 % (ref 32–34.5)
MCHC RBC AUTO-ENTMCNC: 29.9 % (ref 32–34.5)
MCHC RBC AUTO-ENTMCNC: 30 % (ref 32–34.5)
MCHC RBC AUTO-ENTMCNC: 30.1 % (ref 32–34.5)
MCHC RBC AUTO-ENTMCNC: 30.3 % (ref 32–34.5)
MCHC RBC AUTO-ENTMCNC: 30.5 % (ref 32–34.5)
MCHC RBC AUTO-ENTMCNC: 30.6 % (ref 32–34.5)
MCHC RBC AUTO-ENTMCNC: 30.7 % (ref 32–34.5)
MCHC RBC AUTO-ENTMCNC: 31 % (ref 32–34.5)
MCHC RBC AUTO-ENTMCNC: 31.1 % (ref 32–34.5)
MCHC RBC AUTO-ENTMCNC: 31.2 % (ref 32–34.5)
MCHC RBC AUTO-ENTMCNC: 31.4 % (ref 32–34.5)
MCHC RBC AUTO-ENTMCNC: 31.5 % (ref 32–34.5)
MCHC RBC AUTO-ENTMCNC: 31.6 % (ref 32–34.5)
MCHC RBC AUTO-ENTMCNC: 31.7 % (ref 32–34.5)
MCHC RBC AUTO-ENTMCNC: 31.8 % (ref 32–34.5)
MCHC RBC AUTO-ENTMCNC: 32.8 % (ref 32–34.5)
MCV RBC AUTO: 81.3 FL (ref 80–99.9)
MCV RBC AUTO: 83.4 FL (ref 80–99.9)
MCV RBC AUTO: 84 FL (ref 80–99.9)
MCV RBC AUTO: 84.4 FL (ref 80–99.9)
MCV RBC AUTO: 84.6 FL (ref 80–99.9)
MCV RBC AUTO: 85.3 FL (ref 80–99.9)
MCV RBC AUTO: 85.3 FL (ref 80–99.9)
MCV RBC AUTO: 85.8 FL (ref 80–99.9)
MCV RBC AUTO: 86.1 FL (ref 80–99.9)
MCV RBC AUTO: 86.2 FL (ref 80–99.9)
MCV RBC AUTO: 86.5 FL (ref 80–99.9)
MCV RBC AUTO: 86.7 FL (ref 80–99.9)
MCV RBC AUTO: 87 FL (ref 80–99.9)
MCV RBC AUTO: 87.2 FL (ref 80–99.9)
MCV RBC AUTO: 87.3 FL (ref 80–99.9)
MCV RBC AUTO: 88.7 FL (ref 80–99.9)
MCV RBC AUTO: 88.9 FL (ref 80–99.9)
MCV RBC AUTO: 89.1 FL (ref 80–99.9)
MCV RBC AUTO: 89.2 FL (ref 80–99.9)
MCV RBC AUTO: 89.4 FL (ref 80–99.9)
MCV RBC AUTO: 89.5 FL (ref 80–99.9)
MCV RBC AUTO: 90.3 FL (ref 80–99.9)
MCV RBC AUTO: 92.1 FL (ref 80–99.9)
MCV RBC AUTO: 93.5 FL (ref 80–99.9)
MCV RBC AUTO: 94 FL (ref 80–99.9)
METAMYELOCYTES RELATIVE PERCENT: 0.9 % (ref 0–1)
METER GLUCOSE: 103 MG/DL (ref 74–99)
METER GLUCOSE: 106 MG/DL (ref 74–99)
METER GLUCOSE: 114 MG/DL (ref 74–99)
METER GLUCOSE: 115 MG/DL (ref 74–99)
METER GLUCOSE: 120 MG/DL (ref 74–99)
METER GLUCOSE: 122 MG/DL (ref 74–99)
METER GLUCOSE: 124 MG/DL (ref 74–99)
METER GLUCOSE: 132 MG/DL (ref 74–99)
METER GLUCOSE: 132 MG/DL (ref 74–99)
METER GLUCOSE: 134 MG/DL (ref 74–99)
METER GLUCOSE: 135 MG/DL (ref 74–99)
METER GLUCOSE: 136 MG/DL (ref 74–99)
METER GLUCOSE: 143 MG/DL (ref 74–99)
METER GLUCOSE: 146 MG/DL (ref 74–99)
METER GLUCOSE: 157 MG/DL (ref 74–99)
METER GLUCOSE: 160 MG/DL (ref 74–99)
METER GLUCOSE: 162 MG/DL (ref 74–99)
METER GLUCOSE: 169 MG/DL (ref 74–99)
METER GLUCOSE: 170 MG/DL (ref 74–99)
METER GLUCOSE: 173 MG/DL (ref 74–99)
METER GLUCOSE: 175 MG/DL (ref 74–99)
METER GLUCOSE: 176 MG/DL (ref 74–99)
METER GLUCOSE: 177 MG/DL (ref 74–99)
METER GLUCOSE: 179 MG/DL (ref 74–99)
METER GLUCOSE: 194 MG/DL (ref 74–99)
METER GLUCOSE: 195 MG/DL (ref 74–99)
METER GLUCOSE: 195 MG/DL (ref 74–99)
METER GLUCOSE: 198 MG/DL (ref 74–99)
METER GLUCOSE: 202 MG/DL (ref 74–99)
METER GLUCOSE: 209 MG/DL (ref 74–99)
METER GLUCOSE: 209 MG/DL (ref 74–99)
METER GLUCOSE: 214 MG/DL (ref 74–99)
METER GLUCOSE: 233 MG/DL (ref 74–99)
METER GLUCOSE: 243 MG/DL (ref 74–99)
METER GLUCOSE: 248 MG/DL (ref 74–99)
METER GLUCOSE: 261 MG/DL (ref 74–99)
METER GLUCOSE: 264 MG/DL (ref 74–99)
METER GLUCOSE: 273 MG/DL (ref 74–99)
METER GLUCOSE: 274 MG/DL (ref 74–99)
METER GLUCOSE: 276 MG/DL (ref 74–99)
METER GLUCOSE: 280 MG/DL (ref 74–99)
METER GLUCOSE: 280 MG/DL (ref 74–99)
METER GLUCOSE: 283 MG/DL (ref 74–99)
METER GLUCOSE: 285 MG/DL (ref 74–99)
METER GLUCOSE: 287 MG/DL (ref 74–99)
METER GLUCOSE: 290 MG/DL (ref 74–99)
METER GLUCOSE: 295 MG/DL (ref 74–99)
METER GLUCOSE: 301 MG/DL (ref 74–99)
METER GLUCOSE: 307 MG/DL (ref 74–99)
METER GLUCOSE: 308 MG/DL (ref 74–99)
METER GLUCOSE: 317 MG/DL (ref 74–99)
METER GLUCOSE: 325 MG/DL (ref 74–99)
METER GLUCOSE: 345 MG/DL (ref 74–99)
METER GLUCOSE: 347 MG/DL (ref 74–99)
METER GLUCOSE: 376 MG/DL (ref 74–99)
METER GLUCOSE: 398 MG/DL (ref 74–99)
METER GLUCOSE: 469 MG/DL (ref 74–99)
METER GLUCOSE: 48 MG/DL (ref 74–99)
METER GLUCOSE: 484 MG/DL (ref 74–99)
METER GLUCOSE: 50 MG/DL (ref 74–99)
METER GLUCOSE: 51 MG/DL (ref 74–99)
METER GLUCOSE: 60 MG/DL (ref 74–99)
METER GLUCOSE: 75 MG/DL (ref 74–99)
METER GLUCOSE: 81 MG/DL (ref 74–99)
METER GLUCOSE: 84 MG/DL (ref 74–99)
METER GLUCOSE: 87 MG/DL (ref 74–99)
METER GLUCOSE: 94 MG/DL (ref 74–99)
METER GLUCOSE: 96 MG/DL (ref 74–99)
METER GLUCOSE: 99 MG/DL (ref 74–99)
METHICILLIN RESISTANCE MECA/C  BY PCR: NOT DETECTED
MIXING STUDY: NORMAL
MONOCYTES ABSOLUTE: 0.43 E9/L (ref 0.1–0.95)
MONOCYTES ABSOLUTE: 0.46 E9/L (ref 0.1–0.95)
MONOCYTES ABSOLUTE: 0.49 E9/L (ref 0.1–0.95)
MONOCYTES ABSOLUTE: 0.5 E9/L (ref 0.1–0.95)
MONOCYTES ABSOLUTE: 0.51 E9/L (ref 0.1–0.95)
MONOCYTES ABSOLUTE: 0.52 E9/L (ref 0.1–0.95)
MONOCYTES ABSOLUTE: 0.53 E9/L (ref 0.1–0.95)
MONOCYTES ABSOLUTE: 0.55 E9/L (ref 0.1–0.95)
MONOCYTES ABSOLUTE: 0.56 E9/L (ref 0.1–0.95)
MONOCYTES ABSOLUTE: 0.57 E9/L (ref 0.1–0.95)
MONOCYTES ABSOLUTE: 0.58 E9/L (ref 0.1–0.95)
MONOCYTES ABSOLUTE: 0.59 E9/L (ref 0.1–0.95)
MONOCYTES ABSOLUTE: 0.59 E9/L (ref 0.1–0.95)
MONOCYTES ABSOLUTE: 0.61 E9/L (ref 0.1–0.95)
MONOCYTES ABSOLUTE: 0.62 E9/L (ref 0.1–0.95)
MONOCYTES ABSOLUTE: 0.63 E9/L (ref 0.1–0.95)
MONOCYTES ABSOLUTE: 0.65 E9/L (ref 0.1–0.95)
MONOCYTES ABSOLUTE: 0.67 E9/L (ref 0.1–0.95)
MONOCYTES ABSOLUTE: 0.74 E9/L (ref 0.1–0.95)
MONOCYTES ABSOLUTE: 0.74 E9/L (ref 0.1–0.95)
MONOCYTES ABSOLUTE: 0.75 E9/L (ref 0.1–0.95)
MONOCYTES ABSOLUTE: 1.06 E9/L (ref 0.1–0.95)
MONOCYTES RELATIVE PERCENT: 2.6 % (ref 2–12)
MONOCYTES RELATIVE PERCENT: 3.8 % (ref 2–12)
MONOCYTES RELATIVE PERCENT: 4.4 % (ref 2–12)
MONOCYTES RELATIVE PERCENT: 4.4 % (ref 2–12)
MONOCYTES RELATIVE PERCENT: 4.5 % (ref 2–12)
MONOCYTES RELATIVE PERCENT: 4.9 % (ref 2–12)
MONOCYTES RELATIVE PERCENT: 4.9 % (ref 2–12)
MONOCYTES RELATIVE PERCENT: 5 % (ref 2–12)
MONOCYTES RELATIVE PERCENT: 5 % (ref 2–12)
MONOCYTES RELATIVE PERCENT: 5.3 % (ref 2–12)
MONOCYTES RELATIVE PERCENT: 5.4 % (ref 2–12)
MONOCYTES RELATIVE PERCENT: 5.5 % (ref 2–12)
MONOCYTES RELATIVE PERCENT: 5.5 % (ref 2–12)
MONOCYTES RELATIVE PERCENT: 6.1 % (ref 2–12)
MONOCYTES RELATIVE PERCENT: 6.1 % (ref 2–12)
MONOCYTES RELATIVE PERCENT: 6.2 % (ref 2–12)
MONOCYTES RELATIVE PERCENT: 6.5 % (ref 2–12)
MONOCYTES RELATIVE PERCENT: 6.7 % (ref 2–12)
MONOCYTES RELATIVE PERCENT: 6.7 % (ref 2–12)
MONOCYTES RELATIVE PERCENT: 7.4 % (ref 2–12)
MRSA CULTURE ONLY: NORMAL
MYCOPLASMA PNEUMONIAE BY PCR: NOT DETECTED
MYCOPLASMA PNEUMONIAE BY PCR: NOT DETECTED
NEISSERIA MENINGITIDIS BY PCR: NOT DETECTED
NEUTROPHILS ABSOLUTE: 10.44 E9/L (ref 1.8–7.3)
NEUTROPHILS ABSOLUTE: 11.62 E9/L (ref 1.8–7.3)
NEUTROPHILS ABSOLUTE: 11.88 E9/L (ref 1.8–7.3)
NEUTROPHILS ABSOLUTE: 12.22 E9/L (ref 1.8–7.3)
NEUTROPHILS ABSOLUTE: 12.82 E9/L (ref 1.8–7.3)
NEUTROPHILS ABSOLUTE: 14.74 E9/L (ref 1.8–7.3)
NEUTROPHILS ABSOLUTE: 14.9 E9/L (ref 1.8–7.3)
NEUTROPHILS ABSOLUTE: 4.62 E9/L (ref 1.8–7.3)
NEUTROPHILS ABSOLUTE: 5.03 E9/L (ref 1.8–7.3)
NEUTROPHILS ABSOLUTE: 5.16 E9/L (ref 1.8–7.3)
NEUTROPHILS ABSOLUTE: 6.4 E9/L (ref 1.8–7.3)
NEUTROPHILS ABSOLUTE: 6.47 E9/L (ref 1.8–7.3)
NEUTROPHILS ABSOLUTE: 6.72 E9/L (ref 1.8–7.3)
NEUTROPHILS ABSOLUTE: 7.06 E9/L (ref 1.8–7.3)
NEUTROPHILS ABSOLUTE: 7.26 E9/L (ref 1.8–7.3)
NEUTROPHILS ABSOLUTE: 7.32 E9/L (ref 1.8–7.3)
NEUTROPHILS ABSOLUTE: 7.37 E9/L (ref 1.8–7.3)
NEUTROPHILS ABSOLUTE: 7.77 E9/L (ref 1.8–7.3)
NEUTROPHILS ABSOLUTE: 8.42 E9/L (ref 1.8–7.3)
NEUTROPHILS ABSOLUTE: 8.48 E9/L (ref 1.8–7.3)
NEUTROPHILS ABSOLUTE: 8.53 E9/L (ref 1.8–7.3)
NEUTROPHILS ABSOLUTE: 9.88 E9/L (ref 1.8–7.3)
NEUTROPHILS RELATIVE PERCENT: 58.5 % (ref 43–80)
NEUTROPHILS RELATIVE PERCENT: 62.4 % (ref 43–80)
NEUTROPHILS RELATIVE PERCENT: 63.8 % (ref 43–80)
NEUTROPHILS RELATIVE PERCENT: 68.1 % (ref 43–80)
NEUTROPHILS RELATIVE PERCENT: 70.4 % (ref 43–80)
NEUTROPHILS RELATIVE PERCENT: 70.6 % (ref 43–80)
NEUTROPHILS RELATIVE PERCENT: 71.9 % (ref 43–80)
NEUTROPHILS RELATIVE PERCENT: 72 % (ref 43–80)
NEUTROPHILS RELATIVE PERCENT: 72.7 % (ref 43–80)
NEUTROPHILS RELATIVE PERCENT: 72.8 % (ref 43–80)
NEUTROPHILS RELATIVE PERCENT: 73.9 % (ref 43–80)
NEUTROPHILS RELATIVE PERCENT: 75 % (ref 43–80)
NEUTROPHILS RELATIVE PERCENT: 76.3 % (ref 43–80)
NEUTROPHILS RELATIVE PERCENT: 78.1 % (ref 43–80)
NEUTROPHILS RELATIVE PERCENT: 81.8 % (ref 43–80)
NEUTROPHILS RELATIVE PERCENT: 83.3 % (ref 43–80)
NEUTROPHILS RELATIVE PERCENT: 83.3 % (ref 43–80)
NEUTROPHILS RELATIVE PERCENT: 83.6 % (ref 43–80)
NEUTROPHILS RELATIVE PERCENT: 85.2 % (ref 43–80)
NEUTROPHILS RELATIVE PERCENT: 86.9 % (ref 43–80)
NEUTROPHILS RELATIVE PERCENT: 87.2 % (ref 43–80)
NEUTROPHILS RELATIVE PERCENT: 87.8 % (ref 43–80)
NITRITE, URINE: NEGATIVE
NITRITE, URINE: NEGATIVE
NITRITE, URINE: POSITIVE
NITRITE, URINE: POSITIVE
ORDER NUMBER: ABNORMAL
ORGANISM: ABNORMAL
OSMOLALITY URINE: 424 MOSM/KG (ref 300–900)
OSMOLALITY: 302 MOSM/KG (ref 285–310)
OSMOLALITY: 304 MOSM/KG (ref 285–310)
OSMOLALITY: 307 MOSM/KG (ref 285–310)
OVALOCYTES: ABNORMAL
PARAINFLUENZA VIRUS 1 BY PCR: NOT DETECTED
PARAINFLUENZA VIRUS 1 BY PCR: NOT DETECTED
PARAINFLUENZA VIRUS 2 BY PCR: NOT DETECTED
PARAINFLUENZA VIRUS 2 BY PCR: NOT DETECTED
PARAINFLUENZA VIRUS 3 BY PCR: NOT DETECTED
PARAINFLUENZA VIRUS 3 BY PCR: NOT DETECTED
PARAINFLUENZA VIRUS 4 BY PCR: NOT DETECTED
PARAINFLUENZA VIRUS 4 BY PCR: NOT DETECTED
PDW BLD-RTO: 14.6 FL (ref 11.5–15)
PDW BLD-RTO: 14.7 FL (ref 11.5–15)
PDW BLD-RTO: 16.1 FL (ref 11.5–15)
PDW BLD-RTO: 16.2 FL (ref 11.5–15)
PDW BLD-RTO: 16.3 FL (ref 11.5–15)
PDW BLD-RTO: 16.4 FL (ref 11.5–15)
PDW BLD-RTO: 16.6 FL (ref 11.5–15)
PDW BLD-RTO: 16.7 FL (ref 11.5–15)
PDW BLD-RTO: 17 FL (ref 11.5–15)
PDW BLD-RTO: 17 FL (ref 11.5–15)
PDW BLD-RTO: 17.4 FL (ref 11.5–15)
PDW BLD-RTO: 17.8 FL (ref 11.5–15)
PDW BLD-RTO: 17.9 FL (ref 11.5–15)
PDW BLD-RTO: 18.1 FL (ref 11.5–15)
PDW BLD-RTO: 18.1 FL (ref 11.5–15)
PDW BLD-RTO: 18.2 FL (ref 11.5–15)
PDW BLD-RTO: 18.3 FL (ref 11.5–15)
PH UA: 6 (ref 5–9)
PH UA: 6.5 (ref 5–9)
PH VENOUS: 7.37 (ref 7.35–7.45)
PH VENOUS: 7.46 (ref 7.35–7.45)
PHOSPHORUS: 2.4 MG/DL (ref 2.5–4.5)
PHOSPHORUS: 2.6 MG/DL (ref 2.5–4.5)
PHOSPHORUS: 2.6 MG/DL (ref 2.5–4.5)
PHOSPHORUS: 2.8 MG/DL (ref 2.5–4.5)
PHOSPHORUS: 3.1 MG/DL (ref 2.5–4.5)
PLATELET # BLD: 159 E9/L (ref 130–450)
PLATELET # BLD: 159 E9/L (ref 130–450)
PLATELET # BLD: 181 E9/L (ref 130–450)
PLATELET # BLD: 183 E9/L (ref 130–450)
PLATELET # BLD: 183 E9/L (ref 130–450)
PLATELET # BLD: 184 E9/L (ref 130–450)
PLATELET # BLD: 189 E9/L (ref 130–450)
PLATELET # BLD: 192 E9/L (ref 130–450)
PLATELET # BLD: 194 E9/L (ref 130–450)
PLATELET # BLD: 197 E9/L (ref 130–450)
PLATELET # BLD: 197 E9/L (ref 130–450)
PLATELET # BLD: 203 E9/L (ref 130–450)
PLATELET # BLD: 204 E9/L (ref 130–450)
PLATELET # BLD: 205 E9/L (ref 130–450)
PLATELET # BLD: 214 E9/L (ref 130–450)
PLATELET # BLD: 215 E9/L (ref 130–450)
PLATELET # BLD: 220 E9/L (ref 130–450)
PLATELET # BLD: 253 E9/L (ref 130–450)
PLATELET # BLD: 263 E9/L (ref 130–450)
PLATELET # BLD: 276 E9/L (ref 130–450)
PLATELET # BLD: 283 E9/L (ref 130–450)
PLATELET # BLD: 288 E9/L (ref 130–450)
PLATELET # BLD: 297 E9/L (ref 130–450)
PLATELET # BLD: 308 E9/L (ref 130–450)
PLATELET # BLD: 483 E9/L (ref 130–450)
PMV BLD AUTO: 10.3 FL (ref 7–12)
PMV BLD AUTO: 10.3 FL (ref 7–12)
PMV BLD AUTO: 10.4 FL (ref 7–12)
PMV BLD AUTO: 10.5 FL (ref 7–12)
PMV BLD AUTO: 10.6 FL (ref 7–12)
PMV BLD AUTO: 10.7 FL (ref 7–12)
PMV BLD AUTO: 10.9 FL (ref 7–12)
PMV BLD AUTO: 10.9 FL (ref 7–12)
PMV BLD AUTO: 11 FL (ref 7–12)
PMV BLD AUTO: 11 FL (ref 7–12)
PMV BLD AUTO: 11.1 FL (ref 7–12)
PMV BLD AUTO: 11.2 FL (ref 7–12)
PMV BLD AUTO: 11.3 FL (ref 7–12)
PMV BLD AUTO: 11.3 FL (ref 7–12)
PMV BLD AUTO: 11.4 FL (ref 7–12)
POIKILOCYTES: ABNORMAL
POLYCHROMASIA: ABNORMAL
POTASSIUM REFLEX MAGNESIUM: 2.9 MMOL/L (ref 3.5–5)
POTASSIUM REFLEX MAGNESIUM: 3.2 MMOL/L (ref 3.5–5)
POTASSIUM REFLEX MAGNESIUM: 5.2 MMOL/L (ref 3.5–5)
POTASSIUM SERPL-SCNC: 3.1 MMOL/L (ref 3.5–5)
POTASSIUM SERPL-SCNC: 3.2 MMOL/L (ref 3.5–5)
POTASSIUM SERPL-SCNC: 3.3 MMOL/L (ref 3.5–5)
POTASSIUM SERPL-SCNC: 3.3 MMOL/L (ref 3.5–5)
POTASSIUM SERPL-SCNC: 3.5 MMOL/L (ref 3.5–5)
POTASSIUM SERPL-SCNC: 3.6 MMOL/L (ref 3.5–5)
POTASSIUM SERPL-SCNC: 3.7 MMOL/L (ref 3.5–5)
POTASSIUM SERPL-SCNC: 3.8 MMOL/L (ref 3.5–5)
POTASSIUM SERPL-SCNC: 3.9 MMOL/L (ref 3.5–5)
POTASSIUM SERPL-SCNC: 4.2 MMOL/L (ref 3.5–5)
POTASSIUM SERPL-SCNC: 4.4 MMOL/L (ref 3.5–5)
POTASSIUM SERPL-SCNC: 4.8 MMOL/L (ref 3.5–5)
POTASSIUM, UR: 33.5 MMOL/L
PRO-BNP: 155 PG/ML (ref 0–125)
PROCALCITONIN: 0.23 NG/ML (ref 0–0.08)
PROCALCITONIN: 0.35 NG/ML (ref 0–0.08)
PROCALCITONIN: 0.36 NG/ML (ref 0–0.08)
PROMYELOCYTES PERCENT: 0.9 % (ref 0–0)
PROTEIN PROTEIN: 74 MG/DL (ref 0–12)
PROTEIN UA: 100 MG/DL
PROTEIN UA: 100 MG/DL
PROTEIN UA: 30 MG/DL
PROTEIN UA: 30 MG/DL
PROTEIN/CREAT RATIO: 3.5
PROTEIN/CREAT RATIO: 3.5 (ref 0–0.2)
PROTEUS BY PCR: NOT DETECTED
PROTHROMBIN TIME: 11.5 SEC (ref 9.3–12.4)
PROTHROMBIN TIME: 11.7 SEC (ref 9.3–12.4)
PROTHROMBIN TIME: 11.8 SEC (ref 9.3–12.4)
PROTHROMBIN TIME: 12.3 SEC (ref 9.3–12.4)
PROTHROMBIN TIME: 13 SEC (ref 9.3–12.4)
PROTHROMBIN TIME: 13.1 SEC (ref 9.3–12.4)
PROTHROMBIN TIME: 13.2 SEC (ref 9.3–12.4)
PROTHROMBIN TIME: 13.2 SEC (ref 9.3–12.4)
PROTHROMBIN TIME: 13.7 SEC (ref 9.3–12.4)
PROTHROMBIN TIME: 27.2 SEC (ref 9.3–12.4)
PROTHROMBIN TIME: 27.5 SEC (ref 9.3–12.4)
PSEUDOMONAS AERUGINOSA BY PCR: NOT DETECTED
R (REACTION TIME): 4.5 MIN (ref 5–10)
RBC # BLD: 2.48 E12/L (ref 3.5–5.5)
RBC # BLD: 2.64 E12/L (ref 3.5–5.5)
RBC # BLD: 2.66 E12/L (ref 3.5–5.5)
RBC # BLD: 2.66 E12/L (ref 3.5–5.5)
RBC # BLD: 2.71 E12/L (ref 3.5–5.5)
RBC # BLD: 2.74 E12/L (ref 3.5–5.5)
RBC # BLD: 2.74 E12/L (ref 3.5–5.5)
RBC # BLD: 2.78 E12/L (ref 3.5–5.5)
RBC # BLD: 2.82 E12/L (ref 3.5–5.5)
RBC # BLD: 2.89 E12/L (ref 3.5–5.5)
RBC # BLD: 2.99 E12/L (ref 3.5–5.5)
RBC # BLD: 3.01 E12/L (ref 3.5–5.5)
RBC # BLD: 3.14 E12/L (ref 3.5–5.5)
RBC # BLD: 3.15 E12/L (ref 3.5–5.5)
RBC # BLD: 3.15 E12/L (ref 3.5–5.5)
RBC # BLD: 3.24 E12/L (ref 3.5–5.5)
RBC # BLD: 3.52 E12/L (ref 3.5–5.5)
RBC # BLD: 3.63 E12/L (ref 3.5–5.5)
RBC # BLD: 3.68 E12/L (ref 3.5–5.5)
RBC # BLD: 3.88 E12/L (ref 3.5–5.5)
RBC # BLD: 3.88 E12/L (ref 3.5–5.5)
RBC # BLD: 3.97 E12/L (ref 3.5–5.5)
RBC # BLD: 4.48 E12/L (ref 3.5–5.5)
RBC # BLD: 4.5 E12/L (ref 3.5–5.5)
RBC # BLD: 4.95 E12/L (ref 3.5–5.5)
RBC UA: ABNORMAL /HPF (ref 0–2)
REASON FOR REJECTION: NORMAL
REJECTED TEST: NORMAL
RENAL EPITHELIAL, UA: ABNORMAL /HPF
RESPIRATORY SYNCYTIAL VIRUS BY PCR: NOT DETECTED
RESPIRATORY SYNCYTIAL VIRUS BY PCR: NOT DETECTED
SARS-COV-2, PCR: DETECTED
SARS-COV-2, PCR: NOT DETECTED
SARS-COV-2: NOT DETECTED
SEDIMENTATION RATE, ERYTHROCYTE: 71 MM/HR (ref 0–20)
SERRATIA MARCESCENS BY PCR: NOT DETECTED
SODIUM BLD-SCNC: 130 MMOL/L (ref 132–146)
SODIUM BLD-SCNC: 135 MMOL/L (ref 132–146)
SODIUM BLD-SCNC: 137 MMOL/L (ref 132–146)
SODIUM BLD-SCNC: 138 MMOL/L (ref 132–146)
SODIUM BLD-SCNC: 138 MMOL/L (ref 132–146)
SODIUM BLD-SCNC: 139 MMOL/L (ref 132–146)
SODIUM BLD-SCNC: 140 MMOL/L (ref 132–146)
SODIUM BLD-SCNC: 140 MMOL/L (ref 132–146)
SODIUM BLD-SCNC: 141 MMOL/L (ref 132–146)
SODIUM BLD-SCNC: 151 MMOL/L (ref 132–146)
SODIUM URINE: 33 MMOL/L
SODIUM URINE: 46 MMOL/L
SOURCE OF BLOOD CULTURE: ABNORMAL
SOURCE: NORMAL
SPECIFIC GRAVITY UA: 1.01 (ref 1–1.03)
SPECIFIC GRAVITY UA: 1.01 (ref 1–1.03)
SPECIFIC GRAVITY UA: 1.02 (ref 1–1.03)
SPECIFIC GRAVITY UA: 1.02 (ref 1–1.03)
STAPHYLOCOCCUS AUREUS BY PCR: DETECTED
STAPHYLOCOCCUS SPECIES BY PCR: DETECTED
STOMATOCYTES: ABNORMAL
STREPTOCOCCUS AGALACTIAE BY PCR: NOT DETECTED
STREPTOCOCCUS PNEUMONIAE BY PCR: NOT DETECTED
STREPTOCOCCUS PYOGENES  BY PCR: NOT DETECTED
STREPTOCOCCUS SPECIES BY PCR: NOT DETECTED
TEAR DROP CELLS: ABNORMAL
TOTAL PROTEIN: 5.7 G/DL (ref 6.4–8.3)
TOTAL PROTEIN: 5.7 G/DL (ref 6.4–8.3)
TOTAL PROTEIN: 5.8 G/DL (ref 6.4–8.3)
TOTAL PROTEIN: 6 G/DL (ref 6.4–8.3)
TOTAL PROTEIN: 6.1 G/DL (ref 6.4–8.3)
TOTAL PROTEIN: 6.1 G/DL (ref 6.4–8.3)
TOTAL PROTEIN: 6.2 G/DL (ref 6.4–8.3)
TOTAL PROTEIN: 6.3 G/DL (ref 6.4–8.3)
TOTAL PROTEIN: 6.5 G/DL (ref 6.4–8.3)
TOTAL PROTEIN: 6.7 G/DL (ref 6.4–8.3)
TOTAL PROTEIN: 6.9 G/DL (ref 6.4–8.3)
TOTAL PROTEIN: 7 G/DL (ref 6.4–8.3)
TOTAL PROTEIN: 7.1 G/DL (ref 6.4–8.3)
TOTAL PROTEIN: 7.2 G/DL (ref 6.4–8.3)
TROPONIN: 0.02 NG/ML (ref 0–0.03)
TROPONIN: <0.01 NG/ML (ref 0–0.03)
UREA NITROGEN, UR: 522 MG/DL (ref 800–1666)
URINE CULTURE, ROUTINE: ABNORMAL
URINE CULTURE, ROUTINE: NORMAL
UROBILINOGEN, URINE: 0.2 E.U./DL
UROBILINOGEN, URINE: 1 E.U./DL
WBC # BLD: 10 E9/L (ref 4.5–11.5)
WBC # BLD: 10.1 E9/L (ref 4.5–11.5)
WBC # BLD: 10.5 E9/L (ref 4.5–11.5)
WBC # BLD: 10.8 E9/L (ref 4.5–11.5)
WBC # BLD: 11.1 E9/L (ref 4.5–11.5)
WBC # BLD: 11.4 E9/L (ref 4.5–11.5)
WBC # BLD: 11.6 E9/L (ref 4.5–11.5)
WBC # BLD: 11.7 E9/L (ref 4.5–11.5)
WBC # BLD: 11.7 E9/L (ref 4.5–11.5)
WBC # BLD: 12 E9/L (ref 4.5–11.5)
WBC # BLD: 12.1 E9/L (ref 4.5–11.5)
WBC # BLD: 13.7 E9/L (ref 4.5–11.5)
WBC # BLD: 14 E9/L (ref 4.5–11.5)
WBC # BLD: 14.4 E9/L (ref 4.5–11.5)
WBC # BLD: 14.7 E9/L (ref 4.5–11.5)
WBC # BLD: 17.3 E9/L (ref 4.5–11.5)
WBC # BLD: 17.8 E9/L (ref 4.5–11.5)
WBC # BLD: 7.1 E9/L (ref 4.5–11.5)
WBC # BLD: 7.4 E9/L (ref 4.5–11.5)
WBC # BLD: 7.6 E9/L (ref 4.5–11.5)
WBC # BLD: 7.9 E9/L (ref 4.5–11.5)
WBC # BLD: 8.8 E9/L (ref 4.5–11.5)
WBC # BLD: 9.1 E9/L (ref 4.5–11.5)
WBC # BLD: 9.8 E9/L (ref 4.5–11.5)
WBC # BLD: 9.9 E9/L (ref 4.5–11.5)
WBC UA: >20 /HPF (ref 0–5)
WBC UA: >20 /HPF (ref 0–5)
WBC UA: ABNORMAL /HPF (ref 0–5)
WBC UA: ABNORMAL /HPF (ref 0–5)
YEAST: PRESENT /HPF

## 2020-01-01 PROCEDURE — 83036 HEMOGLOBIN GLYCOSYLATED A1C: CPT

## 2020-01-01 PROCEDURE — 2580000003 HC RX 258: Performed by: INTERNAL MEDICINE

## 2020-01-01 PROCEDURE — 82962 GLUCOSE BLOOD TEST: CPT

## 2020-01-01 PROCEDURE — 6370000000 HC RX 637 (ALT 250 FOR IP): Performed by: NEUROMUSCULOSKELETAL MEDICINE & OMM

## 2020-01-01 PROCEDURE — 80053 COMPREHEN METABOLIC PANEL: CPT

## 2020-01-01 PROCEDURE — 74177 CT ABD & PELVIS W/CONTRAST: CPT

## 2020-01-01 PROCEDURE — 83930 ASSAY OF BLOOD OSMOLALITY: CPT

## 2020-01-01 PROCEDURE — 6360000002 HC RX W HCPCS: Performed by: INTERNAL MEDICINE

## 2020-01-01 PROCEDURE — 86850 RBC ANTIBODY SCREEN: CPT

## 2020-01-01 PROCEDURE — C9113 INJ PANTOPRAZOLE SODIUM, VIA: HCPCS | Performed by: INTERNAL MEDICINE

## 2020-01-01 PROCEDURE — 86901 BLOOD TYPING SEROLOGIC RH(D): CPT

## 2020-01-01 PROCEDURE — 96366 THER/PROPH/DIAG IV INF ADDON: CPT

## 2020-01-01 PROCEDURE — 87040 BLOOD CULTURE FOR BACTERIA: CPT

## 2020-01-01 PROCEDURE — 85730 THROMBOPLASTIN TIME PARTIAL: CPT

## 2020-01-01 PROCEDURE — 83735 ASSAY OF MAGNESIUM: CPT

## 2020-01-01 PROCEDURE — 84100 ASSAY OF PHOSPHORUS: CPT

## 2020-01-01 PROCEDURE — 6370000000 HC RX 637 (ALT 250 FOR IP): Performed by: INTERNAL MEDICINE

## 2020-01-01 PROCEDURE — C9113 INJ PANTOPRAZOLE SODIUM, VIA: HCPCS | Performed by: STUDENT IN AN ORGANIZED HEALTH CARE EDUCATION/TRAINING PROGRAM

## 2020-01-01 PROCEDURE — 36415 COLL VENOUS BLD VENIPUNCTURE: CPT

## 2020-01-01 PROCEDURE — 85025 COMPLETE CBC W/AUTO DIFF WBC: CPT

## 2020-01-01 PROCEDURE — 71045 X-RAY EXAM CHEST 1 VIEW: CPT

## 2020-01-01 PROCEDURE — 99285 EMERGENCY DEPT VISIT HI MDM: CPT

## 2020-01-01 PROCEDURE — 93005 ELECTROCARDIOGRAM TRACING: CPT | Performed by: STUDENT IN AN ORGANIZED HEALTH CARE EDUCATION/TRAINING PROGRAM

## 2020-01-01 PROCEDURE — 84484 ASSAY OF TROPONIN QUANT: CPT

## 2020-01-01 PROCEDURE — 6360000002 HC RX W HCPCS: Performed by: STUDENT IN AN ORGANIZED HEALTH CARE EDUCATION/TRAINING PROGRAM

## 2020-01-01 PROCEDURE — 81001 URINALYSIS AUTO W/SCOPE: CPT

## 2020-01-01 PROCEDURE — 85378 FIBRIN DEGRADE SEMIQUANT: CPT

## 2020-01-01 PROCEDURE — 85384 FIBRINOGEN ACTIVITY: CPT

## 2020-01-01 PROCEDURE — 2580000003 HC RX 258: Performed by: STUDENT IN AN ORGANIZED HEALTH CARE EDUCATION/TRAINING PROGRAM

## 2020-01-01 PROCEDURE — 2140000000 HC CCU INTERMEDIATE R&B

## 2020-01-01 PROCEDURE — 6370000000 HC RX 637 (ALT 250 FOR IP): Performed by: STUDENT IN AN ORGANIZED HEALTH CARE EDUCATION/TRAINING PROGRAM

## 2020-01-01 PROCEDURE — 87077 CULTURE AEROBIC IDENTIFY: CPT

## 2020-01-01 PROCEDURE — 99284 EMERGENCY DEPT VISIT MOD MDM: CPT

## 2020-01-01 PROCEDURE — 85018 HEMOGLOBIN: CPT

## 2020-01-01 PROCEDURE — 2000000000 HC ICU R&B

## 2020-01-01 PROCEDURE — P9016 RBC LEUKOCYTES REDUCED: HCPCS

## 2020-01-01 PROCEDURE — 99223 1ST HOSP IP/OBS HIGH 75: CPT | Performed by: INTERNAL MEDICINE

## 2020-01-01 PROCEDURE — 80048 BASIC METABOLIC PNL TOTAL CA: CPT

## 2020-01-01 PROCEDURE — 83880 ASSAY OF NATRIURETIC PEPTIDE: CPT

## 2020-01-01 PROCEDURE — 99232 SBSQ HOSP IP/OBS MODERATE 35: CPT | Performed by: INTERNAL MEDICINE

## 2020-01-01 PROCEDURE — 2060000000 HC ICU INTERMEDIATE R&B

## 2020-01-01 PROCEDURE — 82570 ASSAY OF URINE CREATININE: CPT

## 2020-01-01 PROCEDURE — 96365 THER/PROPH/DIAG IV INF INIT: CPT

## 2020-01-01 PROCEDURE — 0202U NFCT DS 22 TRGT SARS-COV-2: CPT

## 2020-01-01 PROCEDURE — 87081 CULTURE SCREEN ONLY: CPT

## 2020-01-01 PROCEDURE — 93010 ELECTROCARDIOGRAM REPORT: CPT | Performed by: INTERNAL MEDICINE

## 2020-01-01 PROCEDURE — 84145 PROCALCITONIN (PCT): CPT

## 2020-01-01 PROCEDURE — 85014 HEMATOCRIT: CPT

## 2020-01-01 PROCEDURE — 85576 BLOOD PLATELET AGGREGATION: CPT

## 2020-01-01 PROCEDURE — 97530 THERAPEUTIC ACTIVITIES: CPT

## 2020-01-01 PROCEDURE — 96368 THER/DIAG CONCURRENT INF: CPT

## 2020-01-01 PROCEDURE — 82947 ASSAY GLUCOSE BLOOD QUANT: CPT

## 2020-01-01 PROCEDURE — 70450 CT HEAD/BRAIN W/O DYE: CPT

## 2020-01-01 PROCEDURE — 83935 ASSAY OF URINE OSMOLALITY: CPT

## 2020-01-01 PROCEDURE — C1751 CATH, INF, PER/CENT/MIDLINE: HCPCS

## 2020-01-01 PROCEDURE — 97165 OT EVAL LOW COMPLEX 30 MIN: CPT

## 2020-01-01 PROCEDURE — 02HV33Z INSERTION OF INFUSION DEVICE INTO SUPERIOR VENA CAVA, PERCUTANEOUS APPROACH: ICD-10-PCS | Performed by: NEUROMUSCULOSKELETAL MEDICINE & OMM

## 2020-01-01 PROCEDURE — 74176 CT ABD & PELVIS W/O CONTRAST: CPT

## 2020-01-01 PROCEDURE — 94640 AIRWAY INHALATION TREATMENT: CPT

## 2020-01-01 PROCEDURE — 6360000004 HC RX CONTRAST MEDICATION: Performed by: RADIOLOGY

## 2020-01-01 PROCEDURE — 93005 ELECTROCARDIOGRAM TRACING: CPT | Performed by: EMERGENCY MEDICINE

## 2020-01-01 PROCEDURE — 83605 ASSAY OF LACTIC ACID: CPT

## 2020-01-01 PROCEDURE — 87205 SMEAR GRAM STAIN: CPT

## 2020-01-01 PROCEDURE — 87088 URINE BACTERIA CULTURE: CPT

## 2020-01-01 PROCEDURE — 94664 DEMO&/EVAL PT USE INHALER: CPT

## 2020-01-01 PROCEDURE — 85027 COMPLETE CBC AUTOMATED: CPT

## 2020-01-01 PROCEDURE — 84540 ASSAY OF URINE/UREA-N: CPT

## 2020-01-01 PROCEDURE — 6360000002 HC RX W HCPCS: Performed by: NEUROMUSCULOSKELETAL MEDICINE & OMM

## 2020-01-01 PROCEDURE — 2700000000 HC OXYGEN THERAPY PER DAY

## 2020-01-01 PROCEDURE — 2500000003 HC RX 250 WO HCPCS: Performed by: STUDENT IN AN ORGANIZED HEALTH CARE EDUCATION/TRAINING PROGRAM

## 2020-01-01 PROCEDURE — 85347 COAGULATION TIME ACTIVATED: CPT

## 2020-01-01 PROCEDURE — 97535 SELF CARE MNGMENT TRAINING: CPT

## 2020-01-01 PROCEDURE — 6360000002 HC RX W HCPCS: Performed by: EMERGENCY MEDICINE

## 2020-01-01 PROCEDURE — 99223 1ST HOSP IP/OBS HIGH 75: CPT | Performed by: NURSE PRACTITIONER

## 2020-01-01 PROCEDURE — 85610 PROTHROMBIN TIME: CPT

## 2020-01-01 PROCEDURE — 99233 SBSQ HOSP IP/OBS HIGH 50: CPT | Performed by: INTERNAL MEDICINE

## 2020-01-01 PROCEDURE — 87186 SC STD MICRODIL/AGAR DIL: CPT

## 2020-01-01 PROCEDURE — 87070 CULTURE OTHR SPECIMN AEROBIC: CPT

## 2020-01-01 PROCEDURE — 97110 THERAPEUTIC EXERCISES: CPT

## 2020-01-01 PROCEDURE — 87150 DNA/RNA AMPLIFIED PROBE: CPT

## 2020-01-01 PROCEDURE — 86923 COMPATIBILITY TEST ELECTRIC: CPT

## 2020-01-01 PROCEDURE — 97162 PT EVAL MOD COMPLEX 30 MIN: CPT

## 2020-01-01 PROCEDURE — 84133 ASSAY OF URINE POTASSIUM: CPT

## 2020-01-01 PROCEDURE — 36569 INSJ PICC 5 YR+ W/O IMAGING: CPT

## 2020-01-01 PROCEDURE — 82010 KETONE BODYS QUAN: CPT

## 2020-01-01 PROCEDURE — 2580000003 HC RX 258: Performed by: EMERGENCY MEDICINE

## 2020-01-01 PROCEDURE — 96375 TX/PRO/DX INJ NEW DRUG ADDON: CPT

## 2020-01-01 PROCEDURE — 99283 EMERGENCY DEPT VISIT LOW MDM: CPT

## 2020-01-01 PROCEDURE — 82728 ASSAY OF FERRITIN: CPT

## 2020-01-01 PROCEDURE — U0003 INFECTIOUS AGENT DETECTION BY NUCLEIC ACID (DNA OR RNA); SEVERE ACUTE RESPIRATORY SYNDROME CORONAVIRUS 2 (SARS-COV-2) (CORONAVIRUS DISEASE [COVID-19]), AMPLIFIED PROBE TECHNIQUE, MAKING USE OF HIGH THROUGHPUT TECHNOLOGIES AS DESCRIBED BY CMS-2020-01-R: HCPCS

## 2020-01-01 PROCEDURE — 86900 BLOOD TYPING SEROLOGIC ABO: CPT

## 2020-01-01 PROCEDURE — 76705 ECHO EXAM OF ABDOMEN: CPT

## 2020-01-01 PROCEDURE — 97161 PT EVAL LOW COMPLEX 20 MIN: CPT

## 2020-01-01 PROCEDURE — C9113 INJ PANTOPRAZOLE SODIUM, VIA: HCPCS | Performed by: EMERGENCY MEDICINE

## 2020-01-01 PROCEDURE — 96374 THER/PROPH/DIAG INJ IV PUSH: CPT

## 2020-01-01 PROCEDURE — 82800 BLOOD PH: CPT

## 2020-01-01 PROCEDURE — 36592 COLLECT BLOOD FROM PICC: CPT

## 2020-01-01 PROCEDURE — 36430 TRANSFUSION BLD/BLD COMPNT: CPT

## 2020-01-01 PROCEDURE — 97166 OT EVAL MOD COMPLEX 45 MIN: CPT

## 2020-01-01 PROCEDURE — 96361 HYDRATE IV INFUSION ADD-ON: CPT

## 2020-01-01 PROCEDURE — 93306 TTE W/DOPPLER COMPLETE: CPT

## 2020-01-01 PROCEDURE — 82436 ASSAY OF URINE CHLORIDE: CPT

## 2020-01-01 PROCEDURE — 85611 PROTHROMBIN TEST: CPT

## 2020-01-01 PROCEDURE — P9059 PLASMA, FRZ BETWEEN 8-24HOUR: HCPCS

## 2020-01-01 PROCEDURE — 85651 RBC SED RATE NONAUTOMATED: CPT

## 2020-01-01 PROCEDURE — 84300 ASSAY OF URINE SODIUM: CPT

## 2020-01-01 PROCEDURE — 2500000003 HC RX 250 WO HCPCS: Performed by: INTERNAL MEDICINE

## 2020-01-01 PROCEDURE — 80076 HEPATIC FUNCTION PANEL: CPT

## 2020-01-01 PROCEDURE — 76937 US GUIDE VASCULAR ACCESS: CPT

## 2020-01-01 PROCEDURE — 2580000003 HC RX 258: Performed by: NEUROMUSCULOSKELETAL MEDICINE & OMM

## 2020-01-01 PROCEDURE — 86140 C-REACTIVE PROTEIN: CPT

## 2020-01-01 PROCEDURE — 84156 ASSAY OF PROTEIN URINE: CPT

## 2020-01-01 PROCEDURE — 83690 ASSAY OF LIPASE: CPT

## 2020-01-01 RX ORDER — DEXTROSE MONOHYDRATE 50 MG/ML
100 INJECTION, SOLUTION INTRAVENOUS PRN
Status: DISCONTINUED | OUTPATIENT
Start: 2020-01-01 | End: 2020-01-01 | Stop reason: HOSPADM

## 2020-01-01 RX ORDER — ACETAMINOPHEN 325 MG/1
650 TABLET ORAL EVERY 6 HOURS PRN
Status: DISCONTINUED | OUTPATIENT
Start: 2020-01-01 | End: 2020-01-01 | Stop reason: HOSPADM

## 2020-01-01 RX ORDER — INSULIN ASPART 100 [IU]/ML
25 INJECTION, SUSPENSION SUBCUTANEOUS
Status: DISCONTINUED | OUTPATIENT
Start: 2020-01-01 | End: 2020-01-01 | Stop reason: CLARIF

## 2020-01-01 RX ORDER — FUROSEMIDE 20 MG/1
20 TABLET ORAL DAILY
COMMUNITY

## 2020-01-01 RX ORDER — ACETAMINOPHEN 325 MG/1
650 TABLET ORAL EVERY 6 HOURS PRN
COMMUNITY

## 2020-01-01 RX ORDER — SERTRALINE HYDROCHLORIDE 25 MG/1
100 TABLET, FILM COATED ORAL DAILY
Qty: 30 TABLET | Refills: 3 | Status: SHIPPED | OUTPATIENT
Start: 2020-01-01

## 2020-01-01 RX ORDER — POTASSIUM CHLORIDE 20 MEQ/1
40 TABLET, EXTENDED RELEASE ORAL ONCE
Status: COMPLETED | OUTPATIENT
Start: 2020-01-01 | End: 2020-01-01

## 2020-01-01 RX ORDER — NYSTATIN 100000 U/G
OINTMENT TOPICAL 2 TIMES DAILY
Status: DISCONTINUED | OUTPATIENT
Start: 2020-01-01 | End: 2020-01-01 | Stop reason: HOSPADM

## 2020-01-01 RX ORDER — OXYMETAZOLINE HYDROCHLORIDE 0.05 G/100ML
2 SPRAY NASAL ONCE
Status: DISPENSED | OUTPATIENT
Start: 2020-01-01 | End: 2020-01-01

## 2020-01-01 RX ORDER — ONDANSETRON 2 MG/ML
4 INJECTION INTRAMUSCULAR; INTRAVENOUS EVERY 6 HOURS PRN
Status: DISCONTINUED | OUTPATIENT
Start: 2020-01-01 | End: 2020-01-01 | Stop reason: HOSPADM

## 2020-01-01 RX ORDER — ASPIRIN 81 MG/1
81 TABLET ORAL DAILY
Qty: 30 TABLET | Refills: 3 | Status: SHIPPED | OUTPATIENT
Start: 2020-01-01

## 2020-01-01 RX ORDER — SODIUM CHLORIDE 0.9 % (FLUSH) 0.9 %
10 SYRINGE (ML) INJECTION EVERY 12 HOURS SCHEDULED
Status: DISCONTINUED | OUTPATIENT
Start: 2020-01-01 | End: 2020-01-01 | Stop reason: HOSPADM

## 2020-01-01 RX ORDER — 0.9 % SODIUM CHLORIDE 0.9 %
20 INTRAVENOUS SOLUTION INTRAVENOUS ONCE
Status: COMPLETED | OUTPATIENT
Start: 2020-01-01 | End: 2020-01-01

## 2020-01-01 RX ORDER — HEPARIN SODIUM 1000 [USP'U]/ML
80 INJECTION, SOLUTION INTRAVENOUS; SUBCUTANEOUS PRN
Status: DISCONTINUED | OUTPATIENT
Start: 2020-01-01 | End: 2020-01-01 | Stop reason: HOSPADM

## 2020-01-01 RX ORDER — GABAPENTIN 400 MG/1
800 CAPSULE ORAL 3 TIMES DAILY
Status: DISCONTINUED | OUTPATIENT
Start: 2020-01-01 | End: 2020-01-01 | Stop reason: HOSPADM

## 2020-01-01 RX ORDER — HEPARIN SODIUM (PORCINE) LOCK FLUSH IV SOLN 100 UNIT/ML 100 UNIT/ML
3 SOLUTION INTRAVENOUS EVERY 12 HOURS SCHEDULED
Status: DISCONTINUED | OUTPATIENT
Start: 2020-01-01 | End: 2020-01-01

## 2020-01-01 RX ORDER — GLYBURIDE-METFORMIN HYDROCHLORIDE 5; 500 MG/1; MG/1
2 TABLET ORAL 2 TIMES DAILY
COMMUNITY

## 2020-01-01 RX ORDER — 0.9 % SODIUM CHLORIDE 0.9 %
1000 INTRAVENOUS SOLUTION INTRAVENOUS ONCE
Status: COMPLETED | OUTPATIENT
Start: 2020-01-01 | End: 2020-01-01

## 2020-01-01 RX ORDER — LIDOCAINE HYDROCHLORIDE 10 MG/ML
5 INJECTION, SOLUTION EPIDURAL; INFILTRATION; INTRACAUDAL; PERINEURAL ONCE
Status: DISCONTINUED | OUTPATIENT
Start: 2020-01-01 | End: 2020-01-01 | Stop reason: HOSPADM

## 2020-01-01 RX ORDER — MONTELUKAST SODIUM 10 MG/1
10 TABLET ORAL NIGHTLY
Status: DISCONTINUED | OUTPATIENT
Start: 2020-01-01 | End: 2020-01-01 | Stop reason: HOSPADM

## 2020-01-01 RX ORDER — SODIUM CHLORIDE 9 MG/ML
10 INJECTION INTRAVENOUS DAILY
Status: DISCONTINUED | OUTPATIENT
Start: 2020-01-01 | End: 2020-01-01 | Stop reason: HOSPADM

## 2020-01-01 RX ORDER — NICOTINE POLACRILEX 4 MG
15 LOZENGE BUCCAL PRN
Status: DISCONTINUED | OUTPATIENT
Start: 2020-01-01 | End: 2020-01-01 | Stop reason: HOSPADM

## 2020-01-01 RX ORDER — POTASSIUM CHLORIDE 7.45 MG/ML
10 INJECTION INTRAVENOUS
Status: COMPLETED | OUTPATIENT
Start: 2020-01-01 | End: 2020-01-01

## 2020-01-01 RX ORDER — SODIUM CHLORIDE, SODIUM LACTATE, POTASSIUM CHLORIDE, CALCIUM CHLORIDE 600; 310; 30; 20 MG/100ML; MG/100ML; MG/100ML; MG/100ML
1000 INJECTION, SOLUTION INTRAVENOUS ONCE
Status: COMPLETED | OUTPATIENT
Start: 2020-01-01 | End: 2020-01-01

## 2020-01-01 RX ORDER — ACETAMINOPHEN AND CODEINE PHOSPHATE 300; 30 MG/1; MG/1
1 TABLET ORAL EVERY 8 HOURS PRN
Status: ON HOLD | COMMUNITY
End: 2020-01-01 | Stop reason: HOSPADM

## 2020-01-01 RX ORDER — DEXTROSE MONOHYDRATE 25 G/50ML
12.5 INJECTION, SOLUTION INTRAVENOUS PRN
Status: DISCONTINUED | OUTPATIENT
Start: 2020-01-01 | End: 2020-01-01 | Stop reason: SDUPTHER

## 2020-01-01 RX ORDER — SODIUM CHLORIDE 0.9 % (FLUSH) 0.9 %
10 SYRINGE (ML) INJECTION PRN
Status: DISCONTINUED | OUTPATIENT
Start: 2020-01-01 | End: 2020-01-01 | Stop reason: HOSPADM

## 2020-01-01 RX ORDER — POTASSIUM CHLORIDE 20 MEQ/1
40 TABLET, EXTENDED RELEASE ORAL ONCE
Status: DISCONTINUED | OUTPATIENT
Start: 2020-01-01 | End: 2020-01-01 | Stop reason: HOSPADM

## 2020-01-01 RX ORDER — ALBUTEROL SULFATE 90 UG/1
2 AEROSOL, METERED RESPIRATORY (INHALATION) EVERY 6 HOURS PRN
Qty: 1 INHALER | Refills: 3 | Status: SHIPPED | OUTPATIENT
Start: 2020-01-01 | End: 2020-01-01

## 2020-01-01 RX ORDER — HEPARIN SODIUM (PORCINE) LOCK FLUSH IV SOLN 100 UNIT/ML 100 UNIT/ML
3 SOLUTION INTRAVENOUS PRN
Status: DISCONTINUED | OUTPATIENT
Start: 2020-01-01 | End: 2020-01-01 | Stop reason: HOSPADM

## 2020-01-01 RX ORDER — SODIUM CHLORIDE 9 MG/ML
10 INJECTION INTRAVENOUS 2 TIMES DAILY
Status: DISCONTINUED | OUTPATIENT
Start: 2020-01-01 | End: 2020-01-01

## 2020-01-01 RX ORDER — BUDESONIDE AND FORMOTEROL FUMARATE DIHYDRATE 160; 4.5 UG/1; UG/1
2 AEROSOL RESPIRATORY (INHALATION) 2 TIMES DAILY
Status: DISCONTINUED | OUTPATIENT
Start: 2020-01-01 | End: 2020-01-01 | Stop reason: HOSPADM

## 2020-01-01 RX ORDER — LIDOCAINE HYDROCHLORIDE 10 MG/ML
5 INJECTION, SOLUTION EPIDURAL; INFILTRATION; INTRACAUDAL; PERINEURAL ONCE
Status: COMPLETED | OUTPATIENT
Start: 2020-01-01 | End: 2020-01-01

## 2020-01-01 RX ORDER — GABAPENTIN 400 MG/1
400 CAPSULE ORAL 3 TIMES DAILY
COMMUNITY

## 2020-01-01 RX ORDER — PANTOPRAZOLE SODIUM 40 MG/10ML
40 INJECTION, POWDER, LYOPHILIZED, FOR SOLUTION INTRAVENOUS 2 TIMES DAILY
Status: DISCONTINUED | OUTPATIENT
Start: 2020-01-01 | End: 2020-01-01 | Stop reason: HOSPADM

## 2020-01-01 RX ORDER — POTASSIUM CHLORIDE 29.8 MG/ML
40 INJECTION INTRAVENOUS
Status: COMPLETED | OUTPATIENT
Start: 2020-01-01 | End: 2020-01-01

## 2020-01-01 RX ORDER — PROMETHAZINE HYDROCHLORIDE 25 MG/1
12.5 TABLET ORAL EVERY 6 HOURS PRN
Status: DISCONTINUED | OUTPATIENT
Start: 2020-01-01 | End: 2020-01-01 | Stop reason: HOSPADM

## 2020-01-01 RX ORDER — PETROLATUM 42 G/100G
OINTMENT TOPICAL 2 TIMES DAILY PRN
Status: DISCONTINUED | OUTPATIENT
Start: 2020-01-01 | End: 2020-01-01 | Stop reason: HOSPADM

## 2020-01-01 RX ORDER — LORAZEPAM 2 MG/ML
1 INJECTION INTRAMUSCULAR ONCE
Status: COMPLETED | OUTPATIENT
Start: 2020-01-01 | End: 2020-01-01

## 2020-01-01 RX ORDER — DEXTROSE MONOHYDRATE 25 G/50ML
12.5 INJECTION, SOLUTION INTRAVENOUS PRN
Status: DISCONTINUED | OUTPATIENT
Start: 2020-01-01 | End: 2020-01-01 | Stop reason: HOSPADM

## 2020-01-01 RX ORDER — HEPARIN SODIUM (PORCINE) LOCK FLUSH IV SOLN 100 UNIT/ML 100 UNIT/ML
3 SOLUTION INTRAVENOUS PRN
Status: DISCONTINUED | OUTPATIENT
Start: 2020-01-01 | End: 2020-01-01

## 2020-01-01 RX ORDER — DEXTROSE MONOHYDRATE 50 MG/ML
INJECTION, SOLUTION INTRAVENOUS CONTINUOUS
Status: ACTIVE | OUTPATIENT
Start: 2020-01-01 | End: 2020-01-01

## 2020-01-01 RX ORDER — GLYBURIDE 5 MG/1
10 TABLET ORAL 2 TIMES DAILY WITH MEALS
Status: DISCONTINUED | OUTPATIENT
Start: 2020-01-01 | End: 2020-01-01 | Stop reason: HOSPADM

## 2020-01-01 RX ORDER — SUCRALFATE 1 G/1
1 TABLET ORAL
Status: DISCONTINUED | OUTPATIENT
Start: 2020-01-01 | End: 2020-01-01 | Stop reason: HOSPADM

## 2020-01-01 RX ORDER — SODIUM CHLORIDE 9 MG/ML
10 INJECTION INTRAVENOUS 2 TIMES DAILY
Status: DISCONTINUED | OUTPATIENT
Start: 2020-01-01 | End: 2020-01-01 | Stop reason: HOSPADM

## 2020-01-01 RX ORDER — CEFDINIR 300 MG/1
300 CAPSULE ORAL 2 TIMES DAILY
Qty: 14 CAPSULE | Refills: 0 | Status: SHIPPED | OUTPATIENT
Start: 2020-01-01 | End: 2020-01-01

## 2020-01-01 RX ORDER — LORAZEPAM 2 MG/ML
INJECTION INTRAMUSCULAR
Status: DISCONTINUED
Start: 2020-01-01 | End: 2020-01-01 | Stop reason: HOSPADM

## 2020-01-01 RX ORDER — ACETAMINOPHEN 650 MG/1
650 SUPPOSITORY RECTAL EVERY 6 HOURS PRN
Status: DISCONTINUED | OUTPATIENT
Start: 2020-01-01 | End: 2020-01-01 | Stop reason: HOSPADM

## 2020-01-01 RX ORDER — PANTOPRAZOLE SODIUM 40 MG/10ML
40 INJECTION, POWDER, LYOPHILIZED, FOR SOLUTION INTRAVENOUS 2 TIMES DAILY
Status: DISCONTINUED | OUTPATIENT
Start: 2020-01-01 | End: 2020-01-01

## 2020-01-01 RX ORDER — BUDESONIDE AND FORMOTEROL FUMARATE DIHYDRATE 160; 4.5 UG/1; UG/1
2 AEROSOL RESPIRATORY (INHALATION) 2 TIMES DAILY
Qty: 1 INHALER | Refills: 3 | Status: SHIPPED | OUTPATIENT
Start: 2020-01-01

## 2020-01-01 RX ORDER — LIDOCAINE HYDROCHLORIDE 20 MG/ML
JELLY TOPICAL ONCE
Status: DISCONTINUED | OUTPATIENT
Start: 2020-01-01 | End: 2020-01-01 | Stop reason: HOSPADM

## 2020-01-01 RX ORDER — HEPARIN SODIUM (PORCINE) LOCK FLUSH IV SOLN 100 UNIT/ML 100 UNIT/ML
3 SOLUTION INTRAVENOUS EVERY 12 HOURS SCHEDULED
Status: DISCONTINUED | OUTPATIENT
Start: 2020-01-01 | End: 2020-01-01 | Stop reason: HOSPADM

## 2020-01-01 RX ORDER — DEXTROSE MONOHYDRATE 50 MG/ML
100 INJECTION, SOLUTION INTRAVENOUS PRN
Status: DISCONTINUED | OUTPATIENT
Start: 2020-01-01 | End: 2020-01-01 | Stop reason: SDUPTHER

## 2020-01-01 RX ORDER — SODIUM CHLORIDE 9 MG/ML
INJECTION, SOLUTION INTRAVENOUS CONTINUOUS
Status: DISCONTINUED | OUTPATIENT
Start: 2020-01-01 | End: 2020-01-01 | Stop reason: HOSPADM

## 2020-01-01 RX ORDER — ONDANSETRON 4 MG/1
4 TABLET, FILM COATED ORAL EVERY 6 HOURS PRN
Status: DISCONTINUED | OUTPATIENT
Start: 2020-01-01 | End: 2020-01-01 | Stop reason: HOSPADM

## 2020-01-01 RX ORDER — MAGNESIUM SULFATE IN WATER 40 MG/ML
2 INJECTION, SOLUTION INTRAVENOUS ONCE
Status: COMPLETED | OUTPATIENT
Start: 2020-01-01 | End: 2020-01-01

## 2020-01-01 RX ORDER — BUDESONIDE 0.5 MG/2ML
1 INHALANT ORAL 2 TIMES DAILY
Status: DISCONTINUED | OUTPATIENT
Start: 2020-01-01 | End: 2020-01-01 | Stop reason: CLARIF

## 2020-01-01 RX ORDER — GABAPENTIN 400 MG/1
400 CAPSULE ORAL 3 TIMES DAILY
Status: DISCONTINUED | OUTPATIENT
Start: 2020-01-01 | End: 2020-01-01 | Stop reason: HOSPADM

## 2020-01-01 RX ORDER — MONTELUKAST SODIUM 10 MG/1
10 TABLET ORAL NIGHTLY
Qty: 30 TABLET | Refills: 3 | Status: SHIPPED | OUTPATIENT
Start: 2020-01-01 | End: 2020-01-01

## 2020-01-01 RX ORDER — ONDANSETRON 4 MG/1
4 TABLET, FILM COATED ORAL EVERY 6 HOURS PRN
COMMUNITY
End: 2020-01-01

## 2020-01-01 RX ORDER — SODIUM CHLORIDE 0.9 % (FLUSH) 0.9 %
10 SYRINGE (ML) INJECTION PRN
Status: DISCONTINUED | OUTPATIENT
Start: 2020-01-01 | End: 2020-01-01

## 2020-01-01 RX ORDER — 0.9 % SODIUM CHLORIDE 0.9 %
250 INTRAVENOUS SOLUTION INTRAVENOUS ONCE
Status: COMPLETED | OUTPATIENT
Start: 2020-01-01 | End: 2020-01-01

## 2020-01-01 RX ORDER — NYSTATIN 100000 U/G
OINTMENT TOPICAL
Qty: 45 G | Refills: 2 | Status: ON HOLD
Start: 2020-01-01 | End: 2020-01-01 | Stop reason: HOSPADM

## 2020-01-01 RX ORDER — HEPARIN SODIUM 1000 [USP'U]/ML
80 INJECTION, SOLUTION INTRAVENOUS; SUBCUTANEOUS ONCE
Status: COMPLETED | OUTPATIENT
Start: 2020-01-01 | End: 2020-01-01

## 2020-01-01 RX ORDER — INSULIN GLARGINE 100 [IU]/ML
0.25 INJECTION, SOLUTION SUBCUTANEOUS NIGHTLY
Status: DISCONTINUED | OUTPATIENT
Start: 2020-01-01 | End: 2020-01-01 | Stop reason: HOSPADM

## 2020-01-01 RX ORDER — POTASSIUM CHLORIDE 29.8 MG/ML
40 INJECTION INTRAVENOUS ONCE
Status: COMPLETED | OUTPATIENT
Start: 2020-01-01 | End: 2020-01-01

## 2020-01-01 RX ORDER — POLYETHYLENE GLYCOL 3350 17 G/17G
17 POWDER, FOR SOLUTION ORAL DAILY PRN
Status: DISCONTINUED | OUTPATIENT
Start: 2020-01-01 | End: 2020-01-01

## 2020-01-01 RX ORDER — ASPIRIN 81 MG/1
81 TABLET ORAL DAILY
Qty: 30 TABLET | Refills: 3 | Status: SHIPPED | OUTPATIENT
Start: 2020-01-01 | End: 2020-01-01

## 2020-01-01 RX ORDER — ALBUTEROL SULFATE 2.5 MG/3ML
2.5 SOLUTION RESPIRATORY (INHALATION) EVERY 4 HOURS PRN
Status: DISCONTINUED | OUTPATIENT
Start: 2020-01-01 | End: 2020-01-01 | Stop reason: HOSPADM

## 2020-01-01 RX ORDER — DIPHENHYDRAMINE HCL 25 MG
25 TABLET ORAL EVERY 6 HOURS PRN
Status: DISCONTINUED | OUTPATIENT
Start: 2020-01-01 | End: 2020-01-01 | Stop reason: HOSPADM

## 2020-01-01 RX ORDER — SERTRALINE HYDROCHLORIDE 100 MG/1
100 TABLET, FILM COATED ORAL DAILY
Status: DISCONTINUED | OUTPATIENT
Start: 2020-01-01 | End: 2020-01-01 | Stop reason: HOSPADM

## 2020-01-01 RX ORDER — HEPARIN SODIUM 10000 [USP'U]/100ML
18 INJECTION, SOLUTION INTRAVENOUS CONTINUOUS
Status: DISCONTINUED | OUTPATIENT
Start: 2020-01-01 | End: 2020-01-01

## 2020-01-01 RX ORDER — SODIUM CHLORIDE 0.9 % (FLUSH) 0.9 %
10 SYRINGE (ML) INJECTION
Status: ACTIVE | OUTPATIENT
Start: 2020-01-01 | End: 2020-01-01

## 2020-01-01 RX ORDER — SERTRALINE HYDROCHLORIDE 25 MG/1
100 TABLET, FILM COATED ORAL DAILY
Qty: 30 TABLET | Refills: 3 | Status: SHIPPED | OUTPATIENT
Start: 2020-01-01 | End: 2020-01-01

## 2020-01-01 RX ORDER — ONDANSETRON 2 MG/ML
INJECTION INTRAMUSCULAR; INTRAVENOUS
Status: DISCONTINUED
Start: 2020-01-01 | End: 2020-01-01 | Stop reason: HOSPADM

## 2020-01-01 RX ORDER — GUAIFENESIN 100 MG/5ML
200 SOLUTION ORAL EVERY 4 HOURS PRN
COMMUNITY
End: 2020-01-01

## 2020-01-01 RX ORDER — BUDESONIDE AND FORMOTEROL FUMARATE DIHYDRATE 160; 4.5 UG/1; UG/1
2 AEROSOL RESPIRATORY (INHALATION) 2 TIMES DAILY
Qty: 1 INHALER | Refills: 3 | Status: SHIPPED | OUTPATIENT
Start: 2020-01-01 | End: 2020-01-01

## 2020-01-01 RX ORDER — GUAIFENESIN 100 MG/5ML
200 SOLUTION ORAL EVERY 4 HOURS PRN
Status: DISCONTINUED | OUTPATIENT
Start: 2020-01-01 | End: 2020-01-01 | Stop reason: HOSPADM

## 2020-01-01 RX ORDER — ARFORMOTEROL TARTRATE 15 UG/2ML
15 SOLUTION RESPIRATORY (INHALATION) 2 TIMES DAILY
Status: DISCONTINUED | OUTPATIENT
Start: 2020-01-01 | End: 2020-01-01 | Stop reason: CLARIF

## 2020-01-01 RX ORDER — ACETAMINOPHEN 650 MG
45 TABLET, EXTENDED RELEASE ORAL PRN
Status: DISCONTINUED | OUTPATIENT
Start: 2020-01-01 | End: 2020-01-01 | Stop reason: HOSPADM

## 2020-01-01 RX ORDER — LIDOCAINE HYDROCHLORIDE AND EPINEPHRINE 10; 10 MG/ML; UG/ML
20 INJECTION, SOLUTION INFILTRATION; PERINEURAL ONCE
Status: COMPLETED | OUTPATIENT
Start: 2020-01-01 | End: 2020-01-01

## 2020-01-01 RX ORDER — GABAPENTIN 800 MG/1
800 TABLET ORAL 3 TIMES DAILY
Status: ON HOLD | COMMUNITY
End: 2020-01-01

## 2020-01-01 RX ORDER — PETROLATUM 42 G/100G
OINTMENT TOPICAL EVERY 8 HOURS
Status: DISCONTINUED | OUTPATIENT
Start: 2020-01-01 | End: 2020-01-01 | Stop reason: HOSPADM

## 2020-01-01 RX ORDER — HEPARIN SODIUM 1000 [USP'U]/ML
40 INJECTION, SOLUTION INTRAVENOUS; SUBCUTANEOUS PRN
Status: DISCONTINUED | OUTPATIENT
Start: 2020-01-01 | End: 2020-01-01 | Stop reason: HOSPADM

## 2020-01-01 RX ORDER — FLUCONAZOLE 100 MG/1
200 TABLET ORAL ONCE
Status: DISCONTINUED | OUTPATIENT
Start: 2020-01-01 | End: 2020-01-01

## 2020-01-01 RX ORDER — MONTELUKAST SODIUM 10 MG/1
10 TABLET ORAL NIGHTLY
Qty: 30 TABLET | Refills: 3 | Status: SHIPPED | OUTPATIENT
Start: 2020-01-01

## 2020-01-01 RX ORDER — NICOTINE POLACRILEX 4 MG
15 LOZENGE BUCCAL PRN
Status: DISCONTINUED | OUTPATIENT
Start: 2020-01-01 | End: 2020-01-01 | Stop reason: SDUPTHER

## 2020-01-01 RX ADMIN — SKIN PROTECTANT: 44 OINTMENT TOPICAL at 14:51

## 2020-01-01 RX ADMIN — INSULIN LISPRO 3 UNITS: 100 INJECTION, SOLUTION INTRAVENOUS; SUBCUTANEOUS at 06:05

## 2020-01-01 RX ADMIN — METFORMIN HYDROCHLORIDE 1000 MG: 1000 TABLET ORAL at 11:26

## 2020-01-01 RX ADMIN — Medication 400 MG: at 11:28

## 2020-01-01 RX ADMIN — INSULIN LISPRO 2 UNITS: 100 INJECTION, SOLUTION INTRAVENOUS; SUBCUTANEOUS at 21:31

## 2020-01-01 RX ADMIN — HEPARIN SODIUM 16 UNITS/KG/HR: 10000 INJECTION, SOLUTION INTRAVENOUS at 10:50

## 2020-01-01 RX ADMIN — ACETAMINOPHEN 650 MG: 325 TABLET ORAL at 08:45

## 2020-01-01 RX ADMIN — DEXTROSE MONOHYDRATE: 50 INJECTION, SOLUTION INTRAVENOUS at 08:46

## 2020-01-01 RX ADMIN — INSULIN LISPRO 3 UNITS: 100 INJECTION, SOLUTION INTRAVENOUS; SUBCUTANEOUS at 20:54

## 2020-01-01 RX ADMIN — GABAPENTIN 800 MG: 400 CAPSULE ORAL at 15:01

## 2020-01-01 RX ADMIN — METFORMIN HYDROCHLORIDE 1000 MG: 1000 TABLET ORAL at 09:19

## 2020-01-01 RX ADMIN — HEPARIN SODIUM 6970 UNITS: 1000 INJECTION INTRAVENOUS; SUBCUTANEOUS at 19:55

## 2020-01-01 RX ADMIN — GLYBURIDE 10 MG: 5 TABLET ORAL at 08:21

## 2020-01-01 RX ADMIN — PANTOPRAZOLE SODIUM 40 MG: 40 INJECTION, POWDER, FOR SOLUTION INTRAVENOUS at 09:07

## 2020-01-01 RX ADMIN — LORAZEPAM 1 MG: 2 INJECTION INTRAMUSCULAR; INTRAVENOUS at 17:07

## 2020-01-01 RX ADMIN — INSULIN LISPRO 6 UNITS: 100 INJECTION, SOLUTION INTRAVENOUS; SUBCUTANEOUS at 11:13

## 2020-01-01 RX ADMIN — PETROLATUM: 42 OINTMENT TOPICAL at 07:02

## 2020-01-01 RX ADMIN — CEFTRIAXONE SODIUM 2 G: 2 INJECTION, POWDER, FOR SOLUTION INTRAMUSCULAR; INTRAVENOUS at 17:30

## 2020-01-01 RX ADMIN — GABAPENTIN 400 MG: 400 CAPSULE ORAL at 14:34

## 2020-01-01 RX ADMIN — INSULIN LISPRO 2 UNITS: 100 INJECTION, SOLUTION INTRAVENOUS; SUBCUTANEOUS at 16:59

## 2020-01-01 RX ADMIN — POTASSIUM CHLORIDE 40 MEQ: 20 TABLET, EXTENDED RELEASE ORAL at 12:09

## 2020-01-01 RX ADMIN — GABAPENTIN 400 MG: 400 CAPSULE ORAL at 20:53

## 2020-01-01 RX ADMIN — SODIUM CHLORIDE, PRESERVATIVE FREE 10 ML: 5 INJECTION INTRAVENOUS at 08:18

## 2020-01-01 RX ADMIN — GLYBURIDE 10 MG: 5 TABLET ORAL at 08:39

## 2020-01-01 RX ADMIN — METFORMIN HYDROCHLORIDE 1000 MG: 1000 TABLET ORAL at 09:15

## 2020-01-01 RX ADMIN — PETROLATUM: 42 OINTMENT TOPICAL at 13:13

## 2020-01-01 RX ADMIN — Medication 2 G: at 00:39

## 2020-01-01 RX ADMIN — Medication 2 G: at 13:26

## 2020-01-01 RX ADMIN — INSULIN LISPRO 1 UNITS: 100 INJECTION, SOLUTION INTRAVENOUS; SUBCUTANEOUS at 21:43

## 2020-01-01 RX ADMIN — SODIUM CHLORIDE, PRESERVATIVE FREE 10 ML: 5 INJECTION INTRAVENOUS at 20:09

## 2020-01-01 RX ADMIN — POTASSIUM & SODIUM PHOSPHATES POWDER PACK 280-160-250 MG 250 MG: 280-160-250 PACK at 16:34

## 2020-01-01 RX ADMIN — PANTOPRAZOLE SODIUM 40 MG: 40 INJECTION, POWDER, FOR SOLUTION INTRAVENOUS at 20:05

## 2020-01-01 RX ADMIN — SODIUM CHLORIDE, PRESERVATIVE FREE 10 ML: 5 INJECTION INTRAVENOUS at 08:49

## 2020-01-01 RX ADMIN — SODIUM CHLORIDE 20 ML: 9 INJECTION, SOLUTION INTRAVENOUS at 20:30

## 2020-01-01 RX ADMIN — PANTOPRAZOLE SODIUM 40 MG: 40 INJECTION, POWDER, FOR SOLUTION INTRAVENOUS at 20:08

## 2020-01-01 RX ADMIN — ENOXAPARIN SODIUM 40 MG: 40 INJECTION SUBCUTANEOUS at 09:19

## 2020-01-01 RX ADMIN — METFORMIN HYDROCHLORIDE 1000 MG: 1000 TABLET ORAL at 08:39

## 2020-01-01 RX ADMIN — Medication 300 UNITS: at 09:44

## 2020-01-01 RX ADMIN — INSULIN LISPRO 1 UNITS: 100 INJECTION, SOLUTION INTRAVENOUS; SUBCUTANEOUS at 21:40

## 2020-01-01 RX ADMIN — INSULIN LISPRO 4 UNITS: 100 INJECTION, SOLUTION INTRAVENOUS; SUBCUTANEOUS at 10:42

## 2020-01-01 RX ADMIN — POTASSIUM CHLORIDE 10 MEQ: 10 INJECTION, SOLUTION INTRAVENOUS at 14:51

## 2020-01-01 RX ADMIN — ACETAMINOPHEN 650 MG: 325 TABLET ORAL at 10:28

## 2020-01-01 RX ADMIN — METFORMIN HYDROCHLORIDE 1000 MG: 1000 TABLET ORAL at 17:01

## 2020-01-01 RX ADMIN — SERTRALINE HYDROCHLORIDE 100 MG: 100 TABLET ORAL at 08:58

## 2020-01-01 RX ADMIN — GABAPENTIN 800 MG: 400 CAPSULE ORAL at 09:15

## 2020-01-01 RX ADMIN — GABAPENTIN 400 MG: 400 CAPSULE ORAL at 08:18

## 2020-01-01 RX ADMIN — PANTOPRAZOLE SODIUM 40 MG: 40 INJECTION, POWDER, FOR SOLUTION INTRAVENOUS at 09:53

## 2020-01-01 RX ADMIN — SODIUM CHLORIDE: 9 INJECTION, SOLUTION INTRAVENOUS at 20:22

## 2020-01-01 RX ADMIN — GABAPENTIN 400 MG: 400 CAPSULE ORAL at 09:08

## 2020-01-01 RX ADMIN — GABAPENTIN 400 MG: 400 CAPSULE ORAL at 20:40

## 2020-01-01 RX ADMIN — CEFTRIAXONE SODIUM 1 G: 1 INJECTION, POWDER, FOR SOLUTION INTRAMUSCULAR; INTRAVENOUS at 04:41

## 2020-01-01 RX ADMIN — ENOXAPARIN SODIUM 90 MG: 100 INJECTION SUBCUTANEOUS at 08:18

## 2020-01-01 RX ADMIN — GABAPENTIN 400 MG: 400 CAPSULE ORAL at 20:50

## 2020-01-01 RX ADMIN — Medication 2 G: at 16:05

## 2020-01-01 RX ADMIN — GABAPENTIN 800 MG: 400 CAPSULE ORAL at 21:15

## 2020-01-01 RX ADMIN — INSULIN LISPRO 6 UNITS: 100 INJECTION, SOLUTION INTRAVENOUS; SUBCUTANEOUS at 12:10

## 2020-01-01 RX ADMIN — NYSTATIN OINTMENT: 100000 OINTMENT TOPICAL at 08:22

## 2020-01-01 RX ADMIN — NYSTATIN OINTMENT: 100000 OINTMENT TOPICAL at 09:19

## 2020-01-01 RX ADMIN — INSULIN LISPRO 5 UNITS: 100 INJECTION, SOLUTION INTRAVENOUS; SUBCUTANEOUS at 12:25

## 2020-01-01 RX ADMIN — METFORMIN HYDROCHLORIDE 1000 MG: 1000 TABLET ORAL at 17:40

## 2020-01-01 RX ADMIN — POTASSIUM CHLORIDE 10 MEQ: 10 INJECTION, SOLUTION INTRAVENOUS at 13:30

## 2020-01-01 RX ADMIN — SODIUM CHLORIDE 250 ML: 9 INJECTION, SOLUTION INTRAVENOUS at 15:07

## 2020-01-01 RX ADMIN — MONTELUKAST SODIUM 10 MG: 10 TABLET, FILM COATED ORAL at 20:53

## 2020-01-01 RX ADMIN — POTASSIUM CHLORIDE 40 MEQ: 400 INJECTION, SOLUTION INTRAVENOUS at 08:53

## 2020-01-01 RX ADMIN — CEFTRIAXONE 1 G: 1 INJECTION, POWDER, FOR SOLUTION INTRAMUSCULAR; INTRAVENOUS at 13:59

## 2020-01-01 RX ADMIN — SODIUM CHLORIDE, POTASSIUM CHLORIDE, SODIUM LACTATE AND CALCIUM CHLORIDE 1000 ML: 600; 310; 30; 20 INJECTION, SOLUTION INTRAVENOUS at 16:01

## 2020-01-01 RX ADMIN — INSULIN LISPRO 1 UNITS: 100 INJECTION, SOLUTION INTRAVENOUS; SUBCUTANEOUS at 11:46

## 2020-01-01 RX ADMIN — Medication 2 G: at 23:26

## 2020-01-01 RX ADMIN — INSULIN LISPRO 80 UNITS: 100 INJECTION, SUSPENSION SUBCUTANEOUS at 17:02

## 2020-01-01 RX ADMIN — NYSTATIN OINTMENT: 100000 OINTMENT TOPICAL at 20:42

## 2020-01-01 RX ADMIN — SODIUM CHLORIDE 1000 ML: 9 INJECTION, SOLUTION INTRAVENOUS at 16:30

## 2020-01-01 RX ADMIN — POTASSIUM & SODIUM PHOSPHATES POWDER PACK 280-160-250 MG 250 MG: 280-160-250 PACK at 20:42

## 2020-01-01 RX ADMIN — METFORMIN HYDROCHLORIDE 1000 MG: 1000 TABLET ORAL at 15:53

## 2020-01-01 RX ADMIN — SODIUM CHLORIDE, PRESERVATIVE FREE 10 ML: 5 INJECTION INTRAVENOUS at 01:34

## 2020-01-01 RX ADMIN — PETROLATUM: 42 OINTMENT TOPICAL at 08:19

## 2020-01-01 RX ADMIN — METOPROLOL TARTRATE 25 MG: 25 TABLET, FILM COATED ORAL at 21:40

## 2020-01-01 RX ADMIN — INSULIN LISPRO 3 UNITS: 100 INJECTION, SOLUTION INTRAVENOUS; SUBCUTANEOUS at 11:11

## 2020-01-01 RX ADMIN — NYSTATIN OINTMENT: 100000 OINTMENT TOPICAL at 20:35

## 2020-01-01 RX ADMIN — DIPHENHYDRAMINE HYDROCHLORIDE 25 MG: 25 TABLET ORAL at 11:53

## 2020-01-01 RX ADMIN — Medication 10 ML: at 09:53

## 2020-01-01 RX ADMIN — INSULIN LISPRO 3 UNITS: 100 INJECTION, SOLUTION INTRAVENOUS; SUBCUTANEOUS at 06:09

## 2020-01-01 RX ADMIN — INSULIN LISPRO 2 UNITS: 100 INJECTION, SOLUTION INTRAVENOUS; SUBCUTANEOUS at 20:40

## 2020-01-01 RX ADMIN — PANTOPRAZOLE SODIUM 40 MG: 40 INJECTION, POWDER, FOR SOLUTION INTRAVENOUS at 12:10

## 2020-01-01 RX ADMIN — INSULIN LISPRO 3 UNITS: 100 INJECTION, SOLUTION INTRAVENOUS; SUBCUTANEOUS at 06:19

## 2020-01-01 RX ADMIN — PHYTONADIONE 10 MG: 10 INJECTION, EMULSION INTRAMUSCULAR; INTRAVENOUS; SUBCUTANEOUS at 08:53

## 2020-01-01 RX ADMIN — PETROLATUM: 42 OINTMENT TOPICAL at 14:52

## 2020-01-01 RX ADMIN — Medication 2 G: at 17:01

## 2020-01-01 RX ADMIN — INSULIN LISPRO 1 UNITS: 100 INJECTION, SOLUTION INTRAVENOUS; SUBCUTANEOUS at 17:01

## 2020-01-01 RX ADMIN — GABAPENTIN 800 MG: 400 CAPSULE ORAL at 20:15

## 2020-01-01 RX ADMIN — INSULIN LISPRO 2 UNITS: 100 INJECTION, SOLUTION INTRAVENOUS; SUBCUTANEOUS at 06:21

## 2020-01-01 RX ADMIN — GABAPENTIN 400 MG: 400 CAPSULE ORAL at 13:32

## 2020-01-01 RX ADMIN — ENOXAPARIN SODIUM 40 MG: 40 INJECTION SUBCUTANEOUS at 08:46

## 2020-01-01 RX ADMIN — BUDESONIDE 1000 MCG: 0.5 SUSPENSION RESPIRATORY (INHALATION) at 09:10

## 2020-01-01 RX ADMIN — POTASSIUM CHLORIDE 40 MEQ: 20 TABLET, EXTENDED RELEASE ORAL at 11:28

## 2020-01-01 RX ADMIN — PETROLATUM: 42 OINTMENT TOPICAL at 20:55

## 2020-01-01 RX ADMIN — Medication 10 ML: at 12:14

## 2020-01-01 RX ADMIN — INSULIN LISPRO 4 UNITS: 100 INJECTION, SOLUTION INTRAVENOUS; SUBCUTANEOUS at 17:40

## 2020-01-01 RX ADMIN — SKIN PROTECTANT: 44 OINTMENT TOPICAL at 01:34

## 2020-01-01 RX ADMIN — INSULIN LISPRO 2 UNITS: 100 INJECTION, SOLUTION INTRAVENOUS; SUBCUTANEOUS at 16:34

## 2020-01-01 RX ADMIN — Medication 400 MG: at 21:15

## 2020-01-01 RX ADMIN — PANTOPRAZOLE SODIUM 40 MG: 40 INJECTION, POWDER, FOR SOLUTION INTRAVENOUS at 22:42

## 2020-01-01 RX ADMIN — METOPROLOL TARTRATE 25 MG: 25 TABLET, FILM COATED ORAL at 20:00

## 2020-01-01 RX ADMIN — SODIUM CHLORIDE, PRESERVATIVE FREE 10 ML: 5 INJECTION INTRAVENOUS at 09:07

## 2020-01-01 RX ADMIN — NYSTATIN OINTMENT: 100000 OINTMENT TOPICAL at 08:59

## 2020-01-01 RX ADMIN — INSULIN LISPRO 2 UNITS: 100 INJECTION, SOLUTION INTRAVENOUS; SUBCUTANEOUS at 22:37

## 2020-01-01 RX ADMIN — Medication 10 ML: at 20:53

## 2020-01-01 RX ADMIN — SKIN PROTECTANT: 44 OINTMENT TOPICAL at 05:51

## 2020-01-01 RX ADMIN — SODIUM CHLORIDE: 9 INJECTION, SOLUTION INTRAVENOUS at 03:35

## 2020-01-01 RX ADMIN — ACETAMINOPHEN 650 MG: 325 TABLET ORAL at 14:35

## 2020-01-01 RX ADMIN — Medication 2 G: at 00:30

## 2020-01-01 RX ADMIN — DIPHENHYDRAMINE HYDROCHLORIDE 25 MG: 25 TABLET ORAL at 13:04

## 2020-01-01 RX ADMIN — SODIUM CHLORIDE: 9 INJECTION, SOLUTION INTRAVENOUS at 15:50

## 2020-01-01 RX ADMIN — METFORMIN HYDROCHLORIDE 1000 MG: 1000 TABLET ORAL at 08:46

## 2020-01-01 RX ADMIN — PETROLATUM: 42 OINTMENT TOPICAL at 12:30

## 2020-01-01 RX ADMIN — SKIN PROTECTANT: 44 OINTMENT TOPICAL at 06:44

## 2020-01-01 RX ADMIN — GLYBURIDE 10 MG: 5 TABLET ORAL at 16:34

## 2020-01-01 RX ADMIN — SODIUM CHLORIDE: 9 INJECTION, SOLUTION INTRAVENOUS at 17:46

## 2020-01-01 RX ADMIN — ONDANSETRON HYDROCHLORIDE 4 MG: 2 SOLUTION INTRAMUSCULAR; INTRAVENOUS at 21:29

## 2020-01-01 RX ADMIN — SKIN PROTECTANT: 44 OINTMENT TOPICAL at 20:57

## 2020-01-01 RX ADMIN — INSULIN LISPRO 3 UNITS: 100 INJECTION, SOLUTION INTRAVENOUS; SUBCUTANEOUS at 19:59

## 2020-01-01 RX ADMIN — INSULIN LISPRO 80 UNITS: 100 INJECTION, SUSPENSION SUBCUTANEOUS at 18:19

## 2020-01-01 RX ADMIN — PANTOPRAZOLE SODIUM 40 MG: 40 INJECTION, POWDER, FOR SOLUTION INTRAVENOUS at 20:40

## 2020-01-01 RX ADMIN — POTASSIUM CHLORIDE 10 MEQ: 10 INJECTION, SOLUTION INTRAVENOUS at 17:21

## 2020-01-01 RX ADMIN — ARFORMOTEROL TARTRATE 15 MCG: 15 SOLUTION RESPIRATORY (INHALATION) at 09:29

## 2020-01-01 RX ADMIN — INSULIN LISPRO 2 UNITS: 100 INJECTION, SOLUTION INTRAVENOUS; SUBCUTANEOUS at 06:01

## 2020-01-01 RX ADMIN — GABAPENTIN 800 MG: 400 CAPSULE ORAL at 09:19

## 2020-01-01 RX ADMIN — PANTOPRAZOLE SODIUM 40 MG: 40 INJECTION, POWDER, FOR SOLUTION INTRAVENOUS at 20:19

## 2020-01-01 RX ADMIN — PANTOPRAZOLE SODIUM 40 MG: 40 INJECTION, POWDER, FOR SOLUTION INTRAVENOUS at 20:53

## 2020-01-01 RX ADMIN — Medication 2 G: at 16:08

## 2020-01-01 RX ADMIN — SODIUM CHLORIDE: 9 INJECTION, SOLUTION INTRAVENOUS at 15:00

## 2020-01-01 RX ADMIN — GABAPENTIN 800 MG: 400 CAPSULE ORAL at 08:21

## 2020-01-01 RX ADMIN — GLYBURIDE 10 MG: 5 TABLET ORAL at 17:40

## 2020-01-01 RX ADMIN — ENOXAPARIN SODIUM 40 MG: 40 INJECTION SUBCUTANEOUS at 09:20

## 2020-01-01 RX ADMIN — INSULIN LISPRO 80 UNITS: 100 INJECTION, SUSPENSION SUBCUTANEOUS at 22:38

## 2020-01-01 RX ADMIN — DIPHENHYDRAMINE HYDROCHLORIDE 25 MG: 25 TABLET ORAL at 20:58

## 2020-01-01 RX ADMIN — INSULIN LISPRO 4 UNITS: 100 INJECTION, SOLUTION INTRAVENOUS; SUBCUTANEOUS at 16:03

## 2020-01-01 RX ADMIN — SODIUM CHLORIDE 1000 ML: 9 INJECTION, SOLUTION INTRAVENOUS at 01:53

## 2020-01-01 RX ADMIN — INSULIN LISPRO 3 UNITS: 100 INJECTION, SOLUTION INTRAVENOUS; SUBCUTANEOUS at 16:47

## 2020-01-01 RX ADMIN — GABAPENTIN 800 MG: 400 CAPSULE ORAL at 14:05

## 2020-01-01 RX ADMIN — PANTOPRAZOLE SODIUM 40 MG: 40 INJECTION, POWDER, FOR SOLUTION INTRAVENOUS at 08:13

## 2020-01-01 RX ADMIN — GLYBURIDE 10 MG: 5 TABLET ORAL at 16:03

## 2020-01-01 RX ADMIN — GLYBURIDE 10 MG: 5 TABLET ORAL at 09:15

## 2020-01-01 RX ADMIN — GLYBURIDE 10 MG: 5 TABLET ORAL at 11:26

## 2020-01-01 RX ADMIN — SODIUM CHLORIDE, PRESERVATIVE FREE 10 ML: 5 INJECTION INTRAVENOUS at 22:42

## 2020-01-01 RX ADMIN — PETROLATUM: 42 OINTMENT TOPICAL at 14:34

## 2020-01-01 RX ADMIN — POTASSIUM & SODIUM PHOSPHATES POWDER PACK 280-160-250 MG 250 MG: 280-160-250 PACK at 12:09

## 2020-01-01 RX ADMIN — Medication 2 G: at 17:40

## 2020-01-01 RX ADMIN — GABAPENTIN 800 MG: 400 CAPSULE ORAL at 08:59

## 2020-01-01 RX ADMIN — SODIUM CHLORIDE, PRESERVATIVE FREE 10 ML: 5 INJECTION INTRAVENOUS at 09:45

## 2020-01-01 RX ADMIN — SODIUM CHLORIDE, PRESERVATIVE FREE 10 ML: 5 INJECTION INTRAVENOUS at 20:53

## 2020-01-01 RX ADMIN — GLYBURIDE 10 MG: 5 TABLET ORAL at 15:53

## 2020-01-01 RX ADMIN — Medication 2 G: at 23:19

## 2020-01-01 RX ADMIN — PETROLATUM: 42 OINTMENT TOPICAL at 07:09

## 2020-01-01 RX ADMIN — MAGNESIUM SULFATE 2 G: 2 INJECTION INTRAVENOUS at 16:05

## 2020-01-01 RX ADMIN — MONTELUKAST 10 MG: 10 TABLET, FILM COATED ORAL at 20:40

## 2020-01-01 RX ADMIN — Medication 2 G: at 04:46

## 2020-01-01 RX ADMIN — DEXTROSE MONOHYDRATE 12.5 G: 25 INJECTION, SOLUTION INTRAVENOUS at 05:54

## 2020-01-01 RX ADMIN — POTASSIUM CHLORIDE 40 MEQ: 400 INJECTION, SOLUTION INTRAVENOUS at 08:20

## 2020-01-01 RX ADMIN — Medication 2 G: at 20:18

## 2020-01-01 RX ADMIN — INSULIN GLARGINE 22 UNITS: 100 INJECTION, SOLUTION SUBCUTANEOUS at 20:56

## 2020-01-01 RX ADMIN — INSULIN LISPRO 80 UNITS: 100 INJECTION, SUSPENSION SUBCUTANEOUS at 16:56

## 2020-01-01 RX ADMIN — ENOXAPARIN SODIUM 40 MG: 40 INJECTION SUBCUTANEOUS at 08:22

## 2020-01-01 RX ADMIN — PETROLATUM: 42 OINTMENT TOPICAL at 20:58

## 2020-01-01 RX ADMIN — NYSTATIN OINTMENT: 100000 OINTMENT TOPICAL at 08:40

## 2020-01-01 RX ADMIN — INSULIN GLARGINE 22 UNITS: 100 INJECTION, SOLUTION SUBCUTANEOUS at 20:53

## 2020-01-01 RX ADMIN — VANCOMYCIN HYDROCHLORIDE 1000 MG: 1 INJECTION, POWDER, LYOPHILIZED, FOR SOLUTION INTRAVENOUS at 06:39

## 2020-01-01 RX ADMIN — Medication 2 G: at 00:40

## 2020-01-01 RX ADMIN — GABAPENTIN 400 MG: 400 CAPSULE ORAL at 10:22

## 2020-01-01 RX ADMIN — DIPHENHYDRAMINE HYDROCHLORIDE 25 MG: 25 TABLET ORAL at 14:35

## 2020-01-01 RX ADMIN — GABAPENTIN 400 MG: 400 CAPSULE ORAL at 20:05

## 2020-01-01 RX ADMIN — GABAPENTIN 800 MG: 400 CAPSULE ORAL at 14:58

## 2020-01-01 RX ADMIN — PANTOPRAZOLE SODIUM 40 MG: 40 INJECTION, POWDER, FOR SOLUTION INTRAVENOUS at 12:13

## 2020-01-01 RX ADMIN — ENOXAPARIN SODIUM 90 MG: 100 INJECTION SUBCUTANEOUS at 20:53

## 2020-01-01 RX ADMIN — Medication 400 MG: at 20:42

## 2020-01-01 RX ADMIN — NYSTATIN OINTMENT: 100000 OINTMENT TOPICAL at 21:40

## 2020-01-01 RX ADMIN — PETROLATUM: 42 OINTMENT TOPICAL at 21:06

## 2020-01-01 RX ADMIN — DEXTROSE MONOHYDRATE 12.5 G: 25 INJECTION, SOLUTION INTRAVENOUS at 07:01

## 2020-01-01 RX ADMIN — PANTOPRAZOLE SODIUM 40 MG: 40 INJECTION, POWDER, FOR SOLUTION INTRAVENOUS at 20:59

## 2020-01-01 RX ADMIN — ACETAMINOPHEN 650 MG: 325 TABLET ORAL at 10:50

## 2020-01-01 RX ADMIN — NYSTATIN OINTMENT: 100000 OINTMENT TOPICAL at 09:01

## 2020-01-01 RX ADMIN — GABAPENTIN 800 MG: 400 CAPSULE ORAL at 20:23

## 2020-01-01 RX ADMIN — ACETAMINOPHEN 650 MG: 325 TABLET ORAL at 14:58

## 2020-01-01 RX ADMIN — IOPAMIDOL 90 ML: 755 INJECTION, SOLUTION INTRAVENOUS at 16:52

## 2020-01-01 RX ADMIN — VANCOMYCIN HYDROCHLORIDE 1.5 G: 10 INJECTION, POWDER, LYOPHILIZED, FOR SOLUTION INTRAVENOUS at 17:00

## 2020-01-01 RX ADMIN — SODIUM CHLORIDE 1000 ML: 9 INJECTION, SOLUTION INTRAVENOUS at 03:16

## 2020-01-01 RX ADMIN — Medication 2 G: at 09:19

## 2020-01-01 RX ADMIN — Medication 10 ML: at 20:19

## 2020-01-01 RX ADMIN — GLYBURIDE 10 MG: 5 TABLET ORAL at 09:18

## 2020-01-01 RX ADMIN — GABAPENTIN 800 MG: 400 CAPSULE ORAL at 08:46

## 2020-01-01 RX ADMIN — CEFTRIAXONE SODIUM 2 G: 2 INJECTION, POWDER, FOR SOLUTION INTRAMUSCULAR; INTRAVENOUS at 16:26

## 2020-01-01 RX ADMIN — SODIUM CHLORIDE 20 ML: 9 INJECTION, SOLUTION INTRAVENOUS at 17:29

## 2020-01-01 RX ADMIN — DIPHENHYDRAMINE HYDROCHLORIDE 25 MG: 25 TABLET ORAL at 00:06

## 2020-01-01 RX ADMIN — GABAPENTIN 800 MG: 400 CAPSULE ORAL at 21:40

## 2020-01-01 RX ADMIN — POTASSIUM PHOSPHATE, MONOBASIC AND POTASSIUM PHOSPHATE, DIBASIC 10 MMOL: 224; 236 INJECTION, SOLUTION, CONCENTRATE INTRAVENOUS at 10:33

## 2020-01-01 RX ADMIN — PANTOPRAZOLE SODIUM 40 MG: 40 INJECTION, POWDER, FOR SOLUTION INTRAVENOUS at 08:18

## 2020-01-01 RX ADMIN — GABAPENTIN 800 MG: 400 CAPSULE ORAL at 13:25

## 2020-01-01 RX ADMIN — Medication 2 G: at 08:22

## 2020-01-01 RX ADMIN — LIDOCAINE HYDROCHLORIDE AND EPINEPHRINE 20 ML: 10; 10 INJECTION, SOLUTION INFILTRATION; PERINEURAL at 08:44

## 2020-01-01 RX ADMIN — Medication 2 G: at 08:39

## 2020-01-01 RX ADMIN — INSULIN LISPRO 1 UNITS: 100 INJECTION, SOLUTION INTRAVENOUS; SUBCUTANEOUS at 11:11

## 2020-01-01 RX ADMIN — SUCRALFATE 1 G: 1 TABLET ORAL at 20:53

## 2020-01-01 RX ADMIN — Medication 10 ML: at 10:21

## 2020-01-01 RX ADMIN — POTASSIUM & SODIUM PHOSPHATES POWDER PACK 280-160-250 MG 250 MG: 280-160-250 PACK at 13:26

## 2020-01-01 RX ADMIN — DEXTROSE MONOHYDRATE 12.5 G: 25 INJECTION, SOLUTION INTRAVENOUS at 20:21

## 2020-01-01 RX ADMIN — POTASSIUM & SODIUM PHOSPHATES POWDER PACK 280-160-250 MG 250 MG: 280-160-250 PACK at 08:46

## 2020-01-01 RX ADMIN — METOPROLOL TARTRATE 25 MG: 25 TABLET, FILM COATED ORAL at 08:21

## 2020-01-01 RX ADMIN — ONDANSETRON 4 MG: 2 INJECTION INTRAMUSCULAR; INTRAVENOUS at 17:07

## 2020-01-01 RX ADMIN — PANTOPRAZOLE SODIUM 80 MG: 40 INJECTION, POWDER, FOR SOLUTION INTRAVENOUS at 15:57

## 2020-01-01 RX ADMIN — GABAPENTIN 800 MG: 400 CAPSULE ORAL at 20:42

## 2020-01-01 RX ADMIN — PANTOPRAZOLE SODIUM 40 MG: 40 INJECTION, POWDER, FOR SOLUTION INTRAVENOUS at 01:34

## 2020-01-01 RX ADMIN — POTASSIUM CHLORIDE 10 MEQ: 10 INJECTION, SOLUTION INTRAVENOUS at 18:48

## 2020-01-01 RX ADMIN — INSULIN GLARGINE 22 UNITS: 100 INJECTION, SOLUTION SUBCUTANEOUS at 21:07

## 2020-01-01 RX ADMIN — LIDOCAINE HYDROCHLORIDE 0.5 ML: 10 INJECTION, SOLUTION EPIDURAL; INFILTRATION; INTRACAUDAL; PERINEURAL at 13:30

## 2020-01-01 RX ADMIN — PANTOPRAZOLE SODIUM 40 MG: 40 INJECTION, POWDER, FOR SOLUTION INTRAVENOUS at 10:23

## 2020-01-01 RX ADMIN — INSULIN LISPRO 3 UNITS: 100 INJECTION, SOLUTION INTRAVENOUS; SUBCUTANEOUS at 09:05

## 2020-01-01 RX ADMIN — Medication 2 G: at 16:33

## 2020-01-01 RX ADMIN — ENOXAPARIN SODIUM 90 MG: 100 INJECTION SUBCUTANEOUS at 09:57

## 2020-01-01 RX ADMIN — DIPHENHYDRAMINE HYDROCHLORIDE 25 MG: 25 TABLET ORAL at 22:38

## 2020-01-01 RX ADMIN — MONTELUKAST 10 MG: 10 TABLET, FILM COATED ORAL at 20:05

## 2020-01-01 RX ADMIN — SODIUM CHLORIDE, PRESERVATIVE FREE 10 ML: 5 INJECTION INTRAVENOUS at 10:23

## 2020-01-01 RX ADMIN — INSULIN LISPRO 1 UNITS: 100 INJECTION, SOLUTION INTRAVENOUS; SUBCUTANEOUS at 20:15

## 2020-01-01 RX ADMIN — Medication 400 MG: at 20:15

## 2020-01-01 RX ADMIN — Medication 2 G: at 16:45

## 2020-01-01 RX ADMIN — SODIUM CHLORIDE: 9 INJECTION, SOLUTION INTRAVENOUS at 06:21

## 2020-01-01 RX ADMIN — PANTOPRAZOLE SODIUM 40 MG: 40 INJECTION, POWDER, FOR SOLUTION INTRAVENOUS at 08:15

## 2020-01-01 RX ADMIN — GABAPENTIN 800 MG: 400 CAPSULE ORAL at 20:00

## 2020-01-01 RX ADMIN — ENOXAPARIN SODIUM 40 MG: 40 INJECTION SUBCUTANEOUS at 09:15

## 2020-01-01 RX ADMIN — CEFTRIAXONE 1 G: 1 INJECTION, POWDER, FOR SOLUTION INTRAMUSCULAR; INTRAVENOUS at 14:51

## 2020-01-01 RX ADMIN — HEPARIN SODIUM 18 UNITS/KG/HR: 10000 INJECTION, SOLUTION INTRAVENOUS at 20:06

## 2020-01-01 RX ADMIN — INSULIN LISPRO 25 UNITS: 100 INJECTION, SUSPENSION SUBCUTANEOUS at 09:06

## 2020-01-01 RX ADMIN — INSULIN GLARGINE 22 UNITS: 100 INJECTION, SOLUTION SUBCUTANEOUS at 21:39

## 2020-01-01 RX ADMIN — SODIUM CHLORIDE 1000 ML: 9 INJECTION, SOLUTION INTRAVENOUS at 13:26

## 2020-01-01 RX ADMIN — METFORMIN HYDROCHLORIDE 1000 MG: 1000 TABLET ORAL at 16:03

## 2020-01-01 RX ADMIN — Medication 10 ML: at 09:44

## 2020-01-01 RX ADMIN — MAGNESIUM SULFATE 2 G: 2 INJECTION INTRAVENOUS at 12:42

## 2020-01-01 RX ADMIN — PETROLATUM: 42 OINTMENT TOPICAL at 05:36

## 2020-01-01 RX ADMIN — Medication 400 MG: at 12:09

## 2020-01-01 RX ADMIN — INSULIN LISPRO 80 UNITS: 100 INJECTION, SUSPENSION SUBCUTANEOUS at 08:30

## 2020-01-01 RX ADMIN — MONTELUKAST 10 MG: 10 TABLET, FILM COATED ORAL at 20:50

## 2020-01-01 RX ADMIN — HEPARIN SODIUM 16 UNITS/KG/HR: 10000 INJECTION, SOLUTION INTRAVENOUS at 06:12

## 2020-01-01 RX ADMIN — ARFORMOTEROL TARTRATE 15 MCG: 15 SOLUTION RESPIRATORY (INHALATION) at 10:22

## 2020-01-01 RX ADMIN — Medication 10 ML: at 20:08

## 2020-01-01 RX ADMIN — SODIUM CHLORIDE, PRESERVATIVE FREE 10 ML: 5 INJECTION INTRAVENOUS at 08:13

## 2020-01-01 RX ADMIN — INSULIN LISPRO 2 UNITS: 100 INJECTION, SOLUTION INTRAVENOUS; SUBCUTANEOUS at 17:01

## 2020-01-01 RX ADMIN — Medication 400 MG: at 08:46

## 2020-01-01 RX ADMIN — ENOXAPARIN SODIUM 90 MG: 100 INJECTION SUBCUTANEOUS at 20:40

## 2020-01-01 RX ADMIN — NYSTATIN OINTMENT: 100000 OINTMENT TOPICAL at 09:16

## 2020-01-01 RX ADMIN — INSULIN LISPRO 1 UNITS: 100 INJECTION, SOLUTION INTRAVENOUS; SUBCUTANEOUS at 20:55

## 2020-01-01 RX ADMIN — SODIUM CHLORIDE 8 MG/HR: 9 INJECTION, SOLUTION INTRAVENOUS at 08:53

## 2020-01-01 RX ADMIN — INSULIN LISPRO 2 UNITS: 100 INJECTION, SOLUTION INTRAVENOUS; SUBCUTANEOUS at 20:26

## 2020-01-01 RX ADMIN — INSULIN LISPRO 1 UNITS: 100 INJECTION, SOLUTION INTRAVENOUS; SUBCUTANEOUS at 11:57

## 2020-01-01 RX ADMIN — SODIUM CHLORIDE 20 ML: 9 INJECTION, SOLUTION INTRAVENOUS at 14:30

## 2020-01-01 RX ADMIN — INSULIN LISPRO 3 UNITS: 100 INJECTION, SOLUTION INTRAVENOUS; SUBCUTANEOUS at 06:00

## 2020-01-01 RX ADMIN — NYSTATIN OINTMENT: 100000 OINTMENT TOPICAL at 20:16

## 2020-01-01 RX ADMIN — ALTEPLASE 2 MG: 2.2 INJECTION, POWDER, LYOPHILIZED, FOR SOLUTION INTRAVENOUS at 12:15

## 2020-01-01 RX ADMIN — PETROLATUM: 42 OINTMENT TOPICAL at 20:06

## 2020-01-01 RX ADMIN — Medication 2 G: at 12:09

## 2020-01-01 RX ADMIN — SODIUM CHLORIDE 20 ML: 9 INJECTION, SOLUTION INTRAVENOUS at 07:38

## 2020-01-01 RX ADMIN — POTASSIUM & SODIUM PHOSPHATES POWDER PACK 280-160-250 MG 250 MG: 280-160-250 PACK at 17:01

## 2020-01-01 RX ADMIN — INSULIN LISPRO 25 UNITS: 100 INJECTION, SUSPENSION SUBCUTANEOUS at 17:43

## 2020-01-01 RX ADMIN — HEPARIN SODIUM 16 UNITS/KG/HR: 10000 INJECTION, SOLUTION INTRAVENOUS at 13:30

## 2020-01-01 RX ADMIN — GABAPENTIN 800 MG: 400 CAPSULE ORAL at 14:15

## 2020-01-01 RX ADMIN — MONTELUKAST 10 MG: 10 TABLET, FILM COATED ORAL at 20:53

## 2020-01-01 RX ADMIN — Medication 10 ML: at 20:59

## 2020-01-01 RX ADMIN — SODIUM CHLORIDE, PRESERVATIVE FREE 10 ML: 5 INJECTION INTRAVENOUS at 20:40

## 2020-01-01 RX ADMIN — GABAPENTIN 800 MG: 400 CAPSULE ORAL at 08:39

## 2020-01-01 RX ADMIN — GABAPENTIN 800 MG: 400 CAPSULE ORAL at 14:07

## 2020-01-01 RX ADMIN — ENOXAPARIN SODIUM 90 MG: 100 INJECTION SUBCUTANEOUS at 20:50

## 2020-01-01 RX ADMIN — SODIUM CHLORIDE, PRESERVATIVE FREE 10 ML: 5 INJECTION INTRAVENOUS at 12:14

## 2020-01-01 RX ADMIN — Medication 2 G: at 04:15

## 2020-01-01 RX ADMIN — POTASSIUM CHLORIDE 40 MEQ: 400 INJECTION, SOLUTION INTRAVENOUS at 10:28

## 2020-01-01 RX ADMIN — INSULIN GLARGINE 22 UNITS: 100 INJECTION, SOLUTION SUBCUTANEOUS at 22:37

## 2020-01-01 RX ADMIN — INSULIN LISPRO 3 UNITS: 100 INJECTION, SOLUTION INTRAVENOUS; SUBCUTANEOUS at 11:01

## 2020-01-01 RX ADMIN — GABAPENTIN 400 MG: 400 CAPSULE ORAL at 09:57

## 2020-01-01 RX ADMIN — ENOXAPARIN SODIUM 90 MG: 100 INJECTION SUBCUTANEOUS at 09:07

## 2020-01-01 RX ADMIN — IOPAMIDOL 90 ML: 755 INJECTION, SOLUTION INTRAVENOUS at 03:55

## 2020-01-01 RX ADMIN — INSULIN LISPRO 3 UNITS: 100 INJECTION, SOLUTION INTRAVENOUS; SUBCUTANEOUS at 16:48

## 2020-01-01 RX ADMIN — SODIUM CHLORIDE, PRESERVATIVE FREE 10 ML: 5 INJECTION INTRAVENOUS at 09:16

## 2020-01-01 RX ADMIN — NYSTATIN OINTMENT: 100000 OINTMENT TOPICAL at 20:01

## 2020-01-01 RX ADMIN — SODIUM CHLORIDE: 9 INJECTION, SOLUTION INTRAVENOUS at 19:59

## 2020-01-01 RX ADMIN — SODIUM CHLORIDE, PRESERVATIVE FREE 10 ML: 5 INJECTION INTRAVENOUS at 12:10

## 2020-01-01 RX ADMIN — IOPAMIDOL 75 ML: 755 INJECTION, SOLUTION INTRAVENOUS at 12:48

## 2020-01-01 RX ADMIN — INSULIN LISPRO 80 UNITS: 100 INJECTION, SUSPENSION SUBCUTANEOUS at 16:59

## 2020-01-01 RX ADMIN — PETROLATUM: 42 OINTMENT TOPICAL at 13:27

## 2020-01-01 RX ADMIN — PANTOPRAZOLE SODIUM 40 MG: 40 INJECTION, POWDER, FOR SOLUTION INTRAVENOUS at 09:45

## 2020-01-01 RX ADMIN — INSULIN LISPRO 3 UNITS: 100 INJECTION, SOLUTION INTRAVENOUS; SUBCUTANEOUS at 14:03

## 2020-01-01 RX ADMIN — INSULIN LISPRO 4 UNITS: 100 INJECTION, SOLUTION INTRAVENOUS; SUBCUTANEOUS at 16:05

## 2020-01-01 RX ADMIN — PETROLATUM: 42 OINTMENT TOPICAL at 21:57

## 2020-01-01 RX ADMIN — Medication 2 G: at 18:20

## 2020-01-01 RX ADMIN — GLYBURIDE 10 MG: 5 TABLET ORAL at 17:00

## 2020-01-01 RX ADMIN — MAGNESIUM SULFATE HEPTAHYDRATE 2 G: 40 INJECTION, SOLUTION INTRAVENOUS at 06:35

## 2020-01-01 RX ADMIN — INSULIN LISPRO 25 UNITS: 100 INJECTION, SUSPENSION SUBCUTANEOUS at 12:39

## 2020-01-01 RX ADMIN — SODIUM CHLORIDE: 9 INJECTION, SOLUTION INTRAVENOUS at 06:04

## 2020-01-01 RX ADMIN — INSULIN LISPRO 3 UNITS: 100 INJECTION, SOLUTION INTRAVENOUS; SUBCUTANEOUS at 11:27

## 2020-01-01 RX ADMIN — SODIUM CHLORIDE, PRESERVATIVE FREE 10 ML: 5 INJECTION INTRAVENOUS at 09:53

## 2020-01-01 RX ADMIN — PANTOPRAZOLE SODIUM 40 MG: 40 INJECTION, POWDER, FOR SOLUTION INTRAVENOUS at 20:50

## 2020-01-01 RX ADMIN — Medication 300 UNITS: at 20:09

## 2020-01-01 RX ADMIN — GLYBURIDE 10 MG: 5 TABLET ORAL at 16:45

## 2020-01-01 RX ADMIN — DIPHENHYDRAMINE HYDROCHLORIDE 25 MG: 25 TABLET ORAL at 14:15

## 2020-01-01 RX ADMIN — BUDESONIDE 1000 MCG: 0.5 SUSPENSION RESPIRATORY (INHALATION) at 09:29

## 2020-01-01 RX ADMIN — SODIUM CHLORIDE, PRESERVATIVE FREE 10 ML: 5 INJECTION INTRAVENOUS at 20:50

## 2020-01-01 RX ADMIN — ARFORMOTEROL TARTRATE 15 MCG: 15 SOLUTION RESPIRATORY (INHALATION) at 09:10

## 2020-01-01 RX ADMIN — Medication 10 ML: at 10:15

## 2020-01-01 RX ADMIN — INSULIN LISPRO 3 UNITS: 100 INJECTION, SOLUTION INTRAVENOUS; SUBCUTANEOUS at 16:30

## 2020-01-01 RX ADMIN — SKIN PROTECTANT: 44 OINTMENT TOPICAL at 20:46

## 2020-01-01 RX ADMIN — BUDESONIDE 1000 MCG: 0.5 SUSPENSION RESPIRATORY (INHALATION) at 10:22

## 2020-01-01 RX ADMIN — INSULIN LISPRO 1 UNITS: 100 INJECTION, SOLUTION INTRAVENOUS; SUBCUTANEOUS at 17:56

## 2020-01-01 RX ADMIN — INSULIN LISPRO 3 UNITS: 100 INJECTION, SOLUTION INTRAVENOUS; SUBCUTANEOUS at 00:33

## 2020-01-01 RX ADMIN — GABAPENTIN 400 MG: 400 CAPSULE ORAL at 14:50

## 2020-01-01 RX ADMIN — GABAPENTIN 400 MG: 400 CAPSULE ORAL at 08:13

## 2020-01-01 RX ADMIN — GLYBURIDE 10 MG: 5 TABLET ORAL at 08:46

## 2020-01-01 RX ADMIN — METFORMIN HYDROCHLORIDE 1000 MG: 1000 TABLET ORAL at 16:34

## 2020-01-01 RX ADMIN — Medication 2 G: at 09:15

## 2020-01-01 RX ADMIN — GABAPENTIN 400 MG: 400 CAPSULE ORAL at 13:44

## 2020-01-01 RX ADMIN — METFORMIN HYDROCHLORIDE 1000 MG: 1000 TABLET ORAL at 16:45

## 2020-01-01 RX ADMIN — POTASSIUM CHLORIDE 10 MEQ: 10 INJECTION, SOLUTION INTRAVENOUS at 16:02

## 2020-01-01 RX ADMIN — INSULIN LISPRO 4 UNITS: 100 INJECTION, SOLUTION INTRAVENOUS; SUBCUTANEOUS at 17:00

## 2020-01-01 RX ADMIN — NYSTATIN OINTMENT: 100000 OINTMENT TOPICAL at 21:15

## 2020-01-01 RX ADMIN — POTASSIUM CHLORIDE 40 MEQ: 20 TABLET, EXTENDED RELEASE ORAL at 12:25

## 2020-01-01 RX ADMIN — GABAPENTIN 800 MG: 400 CAPSULE ORAL at 20:53

## 2020-01-01 RX ADMIN — METOPROLOL TARTRATE 25 MG: 25 TABLET, FILM COATED ORAL at 09:15

## 2020-01-01 RX ADMIN — GABAPENTIN 400 MG: 400 CAPSULE ORAL at 14:15

## 2020-01-01 RX ADMIN — SODIUM CHLORIDE, PRESERVATIVE FREE 10 ML: 5 INJECTION INTRAVENOUS at 20:05

## 2020-01-01 ASSESSMENT — PAIN SCALES - GENERAL
PAINLEVEL_OUTOF10: 7
PAINLEVEL_OUTOF10: 0
PAINLEVEL_OUTOF10: 3
PAINLEVEL_OUTOF10: 0
PAINLEVEL_OUTOF10: 10
PAINLEVEL_OUTOF10: 0
PAINLEVEL_OUTOF10: 6
PAINLEVEL_OUTOF10: 0
PAINLEVEL_OUTOF10: 7
PAINLEVEL_OUTOF10: 0
PAINLEVEL_OUTOF10: 7
PAINLEVEL_OUTOF10: 0
PAINLEVEL_OUTOF10: 0
PAINLEVEL_OUTOF10: 7
PAINLEVEL_OUTOF10: 0
PAINLEVEL_OUTOF10: 6
PAINLEVEL_OUTOF10: 0
PAINLEVEL_OUTOF10: 0
PAINLEVEL_OUTOF10: 4
PAINLEVEL_OUTOF10: 0
PAINLEVEL_OUTOF10: 7
PAINLEVEL_OUTOF10: 0

## 2020-01-01 ASSESSMENT — ENCOUNTER SYMPTOMS
NAUSEA: 0
NAUSEA: 0
COLOR CHANGE: 0
EYE REDNESS: 0
COUGH: 1
EYES NEGATIVE: 1
VOMITING: 0
SHORTNESS OF BREATH: 0
BLOOD IN STOOL: 1
NAUSEA: 0
PHOTOPHOBIA: 0
ALLERGIC/IMMUNOLOGIC NEGATIVE: 1
SHORTNESS OF BREATH: 1
RHINORRHEA: 0
ABDOMINAL DISTENTION: 1
EYE DISCHARGE: 0
COUGH: 0
VOMITING: 1
COUGH: 0
SHORTNESS OF BREATH: 1
ABDOMINAL PAIN: 0
SHORTNESS OF BREATH: 0
DIARRHEA: 0
SHORTNESS OF BREATH: 0
TROUBLE SWALLOWING: 0
ABDOMINAL DISTENTION: 0
ABDOMINAL DISTENTION: 0
COUGH: 0
DIARRHEA: 0
WHEEZING: 0
ABDOMINAL PAIN: 1
BACK PAIN: 0
BACK PAIN: 0
VOMITING: 0
EYE PAIN: 0
BACK PAIN: 0
PHOTOPHOBIA: 0
NAUSEA: 1
DIARRHEA: 0
ABDOMINAL PAIN: 0
COUGH: 0
ABDOMINAL PAIN: 0
SORE THROAT: 0
VOMITING: 0
ABDOMINAL PAIN: 1
SINUS PRESSURE: 0

## 2020-01-01 ASSESSMENT — PAIN DESCRIPTION - FREQUENCY
FREQUENCY: CONTINUOUS
FREQUENCY: INTERMITTENT
FREQUENCY: CONTINUOUS
FREQUENCY: CONTINUOUS
FREQUENCY: INTERMITTENT

## 2020-01-01 ASSESSMENT — PAIN - FUNCTIONAL ASSESSMENT
PAIN_FUNCTIONAL_ASSESSMENT: PREVENTS OR INTERFERES SOME ACTIVE ACTIVITIES AND ADLS
PAIN_FUNCTIONAL_ASSESSMENT: PREVENTS OR INTERFERES SOME ACTIVE ACTIVITIES AND ADLS

## 2020-01-01 ASSESSMENT — PAIN DESCRIPTION - PAIN TYPE
TYPE: ACUTE PAIN
TYPE: CHRONIC PAIN
TYPE: ACUTE PAIN
TYPE: ACUTE PAIN
TYPE: CHRONIC PAIN

## 2020-01-01 ASSESSMENT — PAIN DESCRIPTION - LOCATION
LOCATION: ABDOMEN
LOCATION: GENERALIZED
LOCATION: LEG;ABDOMEN
LOCATION: LEG
LOCATION: GENERALIZED
LOCATION: GENERALIZED
LOCATION: BACK

## 2020-01-01 ASSESSMENT — PAIN SCALES - WONG BAKER: WONGBAKER_NUMERICALRESPONSE: 0

## 2020-01-01 ASSESSMENT — PAIN DESCRIPTION - PROGRESSION
CLINICAL_PROGRESSION: GRADUALLY WORSENING
CLINICAL_PROGRESSION: NOT CHANGED
CLINICAL_PROGRESSION: GRADUALLY WORSENING

## 2020-01-01 ASSESSMENT — PAIN DESCRIPTION - DESCRIPTORS
DESCRIPTORS: SHARP
DESCRIPTORS: DISCOMFORT
DESCRIPTORS: ACHING
DESCRIPTORS: CONSTANT;DISCOMFORT
DESCRIPTORS: ACHING;DISCOMFORT

## 2020-01-01 ASSESSMENT — PAIN DESCRIPTION - ONSET
ONSET: ON-GOING
ONSET: SUDDEN

## 2020-01-01 ASSESSMENT — PAIN DESCRIPTION - ORIENTATION: ORIENTATION: MID

## 2020-09-10 NOTE — CONSULTS
GENERAL SURGERY  CONSULT NOTE  9/10/2020    Physician Consulted: Dr. Welton Schilder  Reason for Consult: Abdominal pain  Referring Physician: Dr. Feroz BELLAMY  Matheus Pacheco is a 69 y.o. female who presents for evaluation of abdominal pain. Upon entering the room, I asked the patient if she had abdominal pain, and she stated \"I have not had abdominal pain at all since being here\". She denies any nausea, vomiting, diarrhea, constipation, fever, or chills. She has past surgical history of ex-lap for colon cancer s/p resection and in remission (unsure when this was) and incisional hernia repair with mesh (2013). She then had a seroma formation with a chronic abdominal wound on the overlying skin. She says it has been there for years and she picks at it and covers it, but it does not cause her any pain. CT was done which showed large seroma, but did not seem to be infected. Past Medical History:   Diagnosis Date    Arthritis     Asthma     no recent attacks past 18 months    Colon cancer 2006    colon CA    Diabetes mellitus ([facility] [address]     DVT (deep venous thrombosis) 2006    Hernia of abdominal cavity     Hypertension     pt states \"pretty good\"; on no meds    Other disorders of kidney and ureter     kidney stone    PE (pulmonary embolism) 2006       Past Surgical History:   Procedure Laterality Date    ABDOMINAL HERNIA REPAIR  8/5/13    CHOLECYSTECTOMY      COLON SURGERY      partial resection    OVARY REMOVAL      bilateral       Medications Prior to Admission:    Prior to Admission medications    Medication Sig Start Date End Date Taking? Authorizing Provider   cefdinir (OMNICEF) 300 MG capsule Take 1 capsule by mouth 2 times daily for 7 days 9/10/20 9/17/20 Yes Jonna Dey, DO   glyBURIDE (DIABETA) 5 MG tablet Take 2 tablets by mouth 2 times daily (with meals). 8/11/13   Marie Hernandez, DO   metformin (GLUCOPHAGE) 1000 MG tablet Take 1 tablet by mouth 2 times daily (with meals).  8/11/13   Marie Mary, DO   NONFORMULARY Takes OTC antihistamine prn    Historical Provider, MD   celecoxib (CELEBREX) 100 MG capsule Take 100 mg by mouth as needed. Takes prn only    Historical Provider, MD   montelukast (SINGULAIR) 10 MG tablet Take 10 mg by mouth nightly. Historical Provider, MD   furosemide (LASIX) 20 MG tablet Take 20 mg by mouth daily. Historical Provider, MD   gabapentin (NEURONTIN) 300 MG capsule Take 300 mg by mouth 3 times daily. Historical Provider, MD   metolazone (ZAROXOLYN) 2.5 MG tablet Take 2.5 mg by mouth every other day. Takes on M-W-F    Historical Provider, MD   albuterol (PROVENTIL HFA;VENTOLIN HFA) 108 (90 BASE) MCG/ACT inhaler Inhale 2 puffs into the lungs as needed. Historical Provider, MD   budesonide-formoterol (SYMBICORT) 160-4.5 MCG/ACT AERO Inhale 2 puffs into the lungs as needed. Hasn't used in 3 months    Historical Provider, MD   aspirin 81 MG EC tablet Take 81 mg by mouth daily. Last dose taken several months ago    Historical Provider, MD   INSULIN LISP PROT & LISP, HUM, (75-25) 100 UNIT/ML SUSP Inject  into the skin daily (with breakfast). Instructed  NOT  to take morning of surgery    Historical Provider, MD   sertraline (ZOLOFT) 25 MG tablet Take 100 mg by mouth daily. Instructed to take morning of surgery with a sip of water    Historical Provider, MD       Allergies   Allergen Reactions    Fluticasone-Salmeterol      Causes eye blindness in patient       No family history on file.     Social History     Tobacco Use    Smoking status: Never Smoker   Substance Use Topics    Alcohol use: No    Drug use: No         Review of Systems   General ROS: negative  Hematological and Lymphatic ROS: negative  Respiratory ROS: negative  Cardiovascular ROS: negative  Gastrointestinal ROS: negative  Genito-Urinary ROS: negative  Musculoskeletal ROS: As above      PHYSICAL EXAM:    Vitals:    09/10/20 1433   BP: (!) 165/63   Pulse: 98   Resp:    Temp:    SpO2: 100% General Appearance:  awake, alert, oriented, in no acute distress  Skin:  Chronic abdominal wound open with healthy granulation tissue and no discharge. Minimal surrounding erythema. Minimal induration  Head/face:  NCAT  Eyes:  No gross abnormalities. Lungs:  No increased work of breathing  Heart:  RR  Abdomen:  Soft, nontender, slightly distended. Extremities: Extremities warm to touch, pink, with no edema. LABS:    CBC  Recent Labs     09/10/20  1222   WBC 12.0*   HGB 10.3*   HCT 33.1*        BMP  Recent Labs     09/10/20  1222      K 4.2      CO2 23   BUN 11   CREATININE 0.6   CALCIUM 9.7     Liver Function  Recent Labs     09/10/20  1222   BILITOT 0.3   AST 34*   ALT 16   ALKPHOS 73   PROT 7.2   LABALBU 3.4*     No results for input(s): LACTATE in the last 72 hours. No results for input(s): INR, PTT in the last 72 hours. Invalid input(s): PT    RADIOLOGY    Ct Abdomen Pelvis Wo Contrast Additional Contrast? None    Result Date: 9/10/2020  Patient MRN:  [id number] : 1950 Age: 69 years Gender: Female Order Date:  9/10/2020 12:25 PM EXAM: CT ABDOMEN PELVIS WO CONTRAST number of images 434. Technique: Low-dose CT  acquisition technique included one of following options; 1 . Automated exposure control, 2. Adjustment of MA and or KV according to patient's size or 3. Use of iterative reconstruction. INDICATION:  AMS, open old surgical wound AMS, open old surgical wound COMPARISON:  2013 FINDINGS: The lung bases demonstrate atelectasis. There is coronary artery calcification. Small lymph nodes are identified in the right cardiophrenic angle. There is diffuse fatty infiltration of the liver. The gallbladder is absent. Spleen, pancreas, the adrenals and the kidneys are normal except for a 2 mm nonobstructing left kidney stone and a 3 cm right renal cyst. There is an IVC filter and severe vascular calcification of aorta.  Degenerative changes are identified in the thoracolumbar spine. A large fluid collection is identified in the anterior abdominal wall with some rim of calcification measuring 14.5 x 7.6 x 11 cm. There is some adjacent soft tissue thickening and inflammation in the subcutaneous tissue anterior to the fluid collection. Organizing hematoma seroma or developing abscess/cellulitis are considered. Pelvis. Bladder is distended. There is diverticulosis of colon with constipation. The appendix is normal.     Large fluid collection in the subcutaneous tissue in the anterior abdominal wall with adjacent inflammatory densities and skin thickening which may represent organized hematoma/ seroma which may be infected with adjacent cellulitis. Ct Head Wo Contrast    Result Date: 9/10/2020  Clinical indications: Altered mental status. COMPARISON: March 20, 2007. Exposure control: This examination and all examinations utilizing ionizing radiation at this facility done so according to the ALARA (as low as reasonably achievable) principal for radiation dose reduction. Technique: Axial, sagittal and coronal computed tomography of the brain and calvarium was performed without contrast. FINDINGS: There is no evidence for acute intracranial hemorrhage, midline shift or mass effect. There is enlargement of the ventricles, sulci and cisterns bilaterally. There is patchy hypoattenuation in the periventricular deep white matter bilaterally. There are calcifications within the carotid siphons. Otherwise the brain parenchyma, CSF spaces, paranasal sinuses and mastoid air cells and surrounding soft tissue and osseous structures have a satisfactory appearance. The gray-white matter junction is otherwise preserved. The cerebellopontine angle and cisterns are unremarkable. The bony calvarium is negative for acute fracture or aggressive process. The inderjit and brainstem are unremarkable. No evidence for acute intracranial process. Cortical atrophy and chronic periventricular microangiopathy.     Mary Blair

## 2020-09-10 NOTE — ED PROVIDER NOTES
Department of Emergency Medicine   ED  Provider Note  Admit Date/RoomTime: 9/10/2020 10:49 AM  ED Room: 25/25              9/10/20  4:17 PM EDT    History of Present Illness:   Emelina Velasquez is a 69 y.o. female presenting to the ED for AMS, beginning today. The complaint has been persistent, mild in severity, and worsened by nothing. Patient presents via EMS with  for acute altered mental status. Per EMS patient's blood sugar was elevated at 353 she is not taken her medication yet today. .  Patient feels fine has no complaints other than feeling somewhat tired. She specifically denies headache, chest pain, shortness of breath, abdominal pain, nausea, vomiting or any other complaints at this time. Review of Systems:   Pertinent positives and negatives are stated within HPI, all other systems reviewed and are negative. Review of Systems   Constitutional: Negative for chills and fever. HENT: Negative for ear pain, sinus pressure and sore throat. Eyes: Negative for pain, discharge and redness. Respiratory: Negative for cough, shortness of breath and wheezing. Cardiovascular: Negative for chest pain. Gastrointestinal: Negative for abdominal distention, abdominal pain, diarrhea, nausea and vomiting. Genitourinary: Negative for dysuria and frequency. Musculoskeletal: Negative for arthralgias and back pain. Skin: Negative for rash and wound. Neurological: Negative for weakness and headaches. Hematological: Negative for adenopathy. Psychiatric/Behavioral: Positive for confusion.    All other systems reviewed and are negative.           --------------------------------------------- PAST HISTORY ---------------------------------------------  Past Medical History:  has a past medical history of Arthritis, Asthma, Colon cancer, Diabetes mellitus ([facility] [address] DVT (deep venous thrombosis), Hernia of abdominal cavity, Hypertension, Other disorders of kidney and ureter, and PE (pulmonary embolism). Past Surgical History:  has a past surgical history that includes Ovary removal; Cholecystectomy; Colon surgery; and Abdominal hernia repair (8/5/13). Social History:  reports that she has never smoked. She does not have any smokeless tobacco history on file. She reports that she does not drink alcohol or use drugs. Family History: family history is not on file. The patients home medications have been reviewed. Allergies: Fluticasone-salmeterol        ------------------------- NURSING NOTES AND VITALS REVIEWED ---------------------------   The nursing notes within the ED encounter and vital signs as below have been reviewed. BP (!) 165/63   Pulse 98   Temp 97.9 °F (36.6 °C)   Resp 16   Ht 5' 5\" (1.651 m)   Wt 225 lb (102.1 kg)   SpO2 100%   BMI 37.44 kg/m²   Oxygen Saturation Interpretation: Normal      ---------------------------------------------------PHYSICAL EXAM--------------------------------------    Physical Exam  Vitals signs and nursing note reviewed. Constitutional:       Appearance: She is well-developed. HENT:      Head: Normocephalic and atraumatic. Eyes:      Extraocular Movements: Extraocular movements intact. Conjunctiva/sclera: Conjunctivae normal.      Pupils: Pupils are equal, round, and reactive to light. Neck:      Musculoskeletal: Normal range of motion and neck supple. Cardiovascular:      Rate and Rhythm: Normal rate and regular rhythm. Heart sounds: Normal heart sounds. No murmur. Pulmonary:      Effort: Pulmonary effort is normal. No respiratory distress. Breath sounds: Normal breath sounds. No wheezing or rales. Abdominal:      General: Bowel sounds are normal. There is distension. Palpations: Abdomen is soft. Tenderness: There is no abdominal tenderness. There is no guarding or rebound.       Comments: Open but superficial appearing nonhealing central abdominal wound patient states is from hernia repair many years ago and has been open for many years she reports that does not cause her any problems but has never followed up with a physician regarding it   Skin:     General: Skin is warm and dry. Neurological:      Mental Status: She is alert. Cranial Nerves: No cranial nerve deficit. Coordination: Coordination normal.      Comments: Patient is oriented to person and place but states the year is 1971.   No other focal neurological deficits            -------------------------------------------------- RESULTS -------------------------------------------------  All laboratory and radiology results have been personally reviewed by myself   LABS:  Results for orders placed or performed during the hospital encounter of 09/10/20   CBC Auto Differential   Result Value Ref Range    WBC 12.0 (H) 4.5 - 11.5 E9/L    RBC 3.88 3.50 - 5.50 E12/L    Hemoglobin 10.3 (L) 11.5 - 15.5 g/dL    Hematocrit 33.1 (L) 34.0 - 48.0 %    MCV 85.3 80.0 - 99.9 fL    MCH 26.5 26.0 - 35.0 pg    MCHC 31.1 (L) 32.0 - 34.5 %    RDW 14.6 11.5 - 15.0 fL    Platelets 924 087 - 147 E9/L    MPV 11.2 7.0 - 12.0 fL    Neutrophils % 87.2 (H) 43.0 - 80.0 %    Immature Granulocytes % 0.4 0.0 - 5.0 %    Lymphocytes % 6.7 (L) 20.0 - 42.0 %    Monocytes % 4.9 2.0 - 12.0 %    Eosinophils % 0.5 0.0 - 6.0 %    Basophils % 0.3 0.0 - 2.0 %    Neutrophils Absolute 10.44 (H) 1.80 - 7.30 E9/L    Immature Granulocytes # 0.05 E9/L    Lymphocytes Absolute 0.80 (L) 1.50 - 4.00 E9/L    Monocytes Absolute 0.59 0.10 - 0.95 E9/L    Eosinophils Absolute 0.06 0.05 - 0.50 E9/L    Basophils Absolute 0.03 0.00 - 0.20 E9/L   Comprehensive Metabolic Panel   Result Value Ref Range    Sodium 137 132 - 146 mmol/L    Potassium 4.2 3.5 - 5.0 mmol/L    Chloride 103 98 - 107 mmol/L    CO2 23 22 - 29 mmol/L    Anion Gap 11 7 - 16 mmol/L    Glucose 309 (H) 74 - 99 mg/dL    BUN 11 8 - 23 mg/dL    CREATININE 0.6 0.5 - 1.0 mg/dL    GFR Non-African American >60 >=60 mL/min/1.73    GFR African American >60     Calcium 9.7 8.6 - 10.2 mg/dL    Total Protein 7.2 6.4 - 8.3 g/dL    Alb 3.4 (L) 3.5 - 5.2 g/dL    Total Bilirubin 0.3 0.0 - 1.2 mg/dL    Alkaline Phosphatase 73 35 - 104 U/L    ALT 16 0 - 32 U/L    AST 34 (H) 0 - 31 U/L   Urinalysis with Microscopic   Result Value Ref Range    Color, UA Yellow Straw/Yellow    Clarity, UA CLOUDY (A) Clear    Glucose, Ur >=1000 (A) Negative mg/dL    Bilirubin Urine Negative Negative    Ketones, Urine TRACE (A) Negative mg/dL    Specific Gravity, UA 1.025 1.005 - 1.030    Blood, Urine TRACE-INTACT Negative    pH, UA 6.0 5.0 - 9.0    Protein,  (A) Negative mg/dL    Urobilinogen, Urine 0.2 <2.0 E.U./dL    Nitrite, Urine POSITIVE (A) Negative    Leukocyte Esterase, Urine SMALL (A) Negative    WBC, UA >20 (A) 0 - 5 /HPF    RBC, UA 5-10 (A) 0 - 2 /HPF    Epithelial Cells, UA FEW /HPF    Renal Epithelial, UA FEW /HPF    Bacteria, UA MANY (A) None Seen /HPF       RADIOLOGY:  Interpreted by Radiologist.  XR CHEST PORTABLE   Final Result      No evidence for acute cardiopulmonary process. CT HEAD WO CONTRAST   Final Result      No evidence for acute intracranial process. Cortical atrophy and chronic periventricular microangiopathy.       CT ABDOMEN PELVIS WO CONTRAST Additional Contrast? None   Final Result   Large fluid collection in the subcutaneous tissue in the anterior   abdominal wall with adjacent inflammatory densities and skin   thickening which may represent organized hematoma/ seroma which may be   infected with adjacent cellulitis.                  ------------------------------ ED COURSE/MEDICAL DECISION MAKING----------------------  Medications   sodium chloride flush 0.9 % injection 10 mL (has no administration in time range)   sodium chloride flush 0.9 % injection 10 mL (has no administration in time range)   cefTRIAXone (ROCEPHIN) 1 g in sterile water 10 mL IV syringe (0 g Intravenous Stopped 9/10/20 9964)       ED Course as of Sep 10 1635 Thu Sep 10, 2020   1510 Discussed case with Dr. 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 Patient becoming very agitated and requesting to leave she does not want to wait here any longer however she does not currently have the mental capacity to leave [address]      [MF]   [phone] Surgical resident called there is nothing to do acutely regarding patient's likely seroma. This is a chronic issue and we followed up outpatient when patient is feeling better    [MF]   [id number] Discussed results and plan with patient and her  despite patient's confusion  reports she is comfortable taking her back home that he takes good care of her will make sure she takes her medications. Given strict return precautions and instructions to follow-up with her family doctor and he expresses understanding and is agreeable. []      ED Course User Index  [MF] Socorro Ontiveros,           Medical Decision Making:    Patient presents for acute altered mental status found to have a UTI. 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 Patient started antibiotics for UTI recommend admission due to her delirium however  is insistent he feels comfortable taking her home and taking care of her. He is given strict return precautions and he expresses understanding    Counseling: The emergency provider has spoken with the patient and discussed todays results, in addition to providing specific details for the plan of care and counseling regarding the diagnosis and prognosis. Questions are answered at this time and they are agreeable with the plan.      --------------------------------- IMPRESSION AND DISPOSITION ---------------------------------    IMPRESSION  1. Urinary tract infection without hematuria, site unspecified    2. Delirium        DISPOSITION  Disposition: Discharge to home  Patient condition is stable      NOTE: This report was transcribed using voice recognition software.  Effort was made to ensure accuracy; however, inadvertent computerized transcription errors may be present         Bacilio Ruiz DO  09/13/20 1128

## 2020-11-04 PROBLEM — N39.0 UTI (URINARY TRACT INFECTION): Status: ACTIVE | Noted: 2020-01-01

## 2020-11-04 NOTE — ED NOTES
Blood collected and sent to lab, unable to acquire iv access x 2 tries, will get other RN for ultrasound IV.      Bruce Dumont RN  11/04/20 8809

## 2020-11-04 NOTE — ED PROVIDER NOTES
Aneudy Corley is a 78-year-old female who presented to emergency department from home due to fatigue and diffuse weakness that has been present for about 6 days and worsening. Patient was admitted to hospital in September for abdominal pain and found to have a seroma. Patient's  stated that surgery offered to drain the seroma but patient declined at that time. Patient has been picking at her abdominal wound and has had mild bloody drainage. Patient has also had increasing urination and odor to her urine per her . Patient has had skin breakdown from urination. Patient has been refusing to eat for the past 6 days. Patient is taking minimal fluids p.o. Patient denies chest pain or shortness of breath. Patient and  deny confusion. Patient has not had fevers. Patient has not had any sick contacts. The history is provided by the patient and the spouse. Fatigue   Severity:  Moderate  Onset quality:  Gradual  Duration:  6 days  Timing:  Constant  Progression:  Worsening  Chronicity:  Recurrent  Context: recent infection and urinary tract infection    Relieved by:  Nothing  Worsened by:  Nothing  Ineffective treatments:  None tried  Associated symptoms: frequency    Associated symptoms: no abdominal pain, no chest pain, no cough, no diarrhea, no fever, no headaches, no nausea, no shortness of breath and no vomiting    Risk factors: no new medications         Review of Systems   Constitutional: Positive for fatigue. Negative for chills, diaphoresis and fever. Eyes: Negative for photophobia and visual disturbance. Respiratory: Negative for cough and shortness of breath. Cardiovascular: Negative for chest pain. Gastrointestinal: Negative for abdominal distention, abdominal pain, diarrhea, nausea and vomiting. Genitourinary: Positive for frequency. Negative for difficulty urinating. Musculoskeletal: Negative for neck pain and neck stiffness.    Skin: Negative for pallor and rash.   Allergic/Immunologic: Negative for immunocompromised state. Neurological: Negative for headaches. Psychiatric/Behavioral: Negative for confusion. Physical Exam  Vitals signs and nursing note reviewed. Constitutional:       Appearance: Normal appearance. HENT:      Head: Normocephalic and atraumatic. Mouth/Throat:      Mouth: Mucous membranes are dry. Eyes:      Conjunctiva/sclera: Conjunctivae normal.      Pupils: Pupils are equal, round, and reactive to light. Neck:      Musculoskeletal: Normal range of motion and neck supple. No neck rigidity or muscular tenderness. Cardiovascular:      Rate and Rhythm: Normal rate and regular rhythm. Pulmonary:      Effort: Pulmonary effort is normal.      Breath sounds: Normal breath sounds. Abdominal:      General: Bowel sounds are normal. There is no distension. Palpations: Abdomen is soft. Tenderness: There is no abdominal tenderness. There is no guarding or rebound. Skin:     General: Skin is warm and dry. Capillary Refill: Capillary refill takes 2 to 3 seconds. Coloration: Skin is not pale. Findings: No erythema or rash. Neurological:      General: No focal deficit present. Mental Status: She is alert. Comments: Oriented x2   Psychiatric:         Mood and Affect: Mood normal.          Procedures     MDM  Number of Diagnoses or Management Options  Abdominal wall seroma, initial encounter:   Gastritis and duodenitis:   Hyperglycemia:   Hypokalemia:   Leukocytosis, unspecified type:   Urinary tract infection with hematuria, site unspecified:   Diagnosis management comments: Juan Carlos Lei is a 69year old female who presented to ED with fatigue, weakness. Patient has been feeling generalized weakness for the past 6 days with decreased PO. Patient was found to have a UTI likely source of patient's infection.  Patient also found to have a stable seroma and duodenitis on CT scan possible secondary sources of infection. Patient given 2L IVF in ED, patient also found to be hyperglycemic without acidosis. Patient was ordered IV replacement to potassium as patient feels she cannot tolerate PO replacement, patient was not given insulin in ED due to hypokalemia. Patient's hyponatremia corrected for hyperglycemia. Magnesium also replaced. Patient rocephin for UTI. Discussed results and plan with patient and  they are agreeable to plan. Patient was accepted for admission by Dr. Rebeca Davis. Consults placed to ID and surgery. Patient stable and well appearing while in ED       ED Course as of Nov 04 1933   Wed Nov 04, 2020   1509 ATTENDING PROVIDER ATTESTATION:     I have personally performed and/or participated in the history, exam, medical decision making, and procedures and agree with all pertinent clinical information unless otherwise noted. I have also reviewed and agree with the past medical, family and social history unless otherwise noted. I have discussed this patient in detail with the resident and provided the instruction and education regarding the evidence-based evaluation and treatment of fatigue    History: This patient with about 1 week of increasing generalized fatigue and weakness. Patient is known to have a chronic seroma on her abdominal wall and has been admitted for evaluation of this in the past.  She has been picking at this abdominal wound and has created some bloody drainage there as well. Family also reports some urinary incontinence is a chronic issue causing some skin breakdown. My findings: Silvio Ramey is a 69 y.o. female whom is in no distress. Physical exam reveals she is alert. Heart is regular, lungs are coarse. Abdomen is soft. There is an area where she has been picking and does have some small area of scabbing. Skin is otherwise warm and dry. My plan: Symptomatic and supportive care. Labs. X-ray.     Electronically signed by Cynthia Snider DO on 11/4/20 at 148 ms    QRS Duration 80 ms    Q-T Interval 456 ms    QTc Calculation (Bazett) 557 ms    P Axis 49 degrees    R Axis 9 degrees    T Axis -61 degrees       RADIOLOGY:  CT ABDOMEN PELVIS W IV CONTRAST Additional Contrast? None   Final Result   1. Stable hypoattenuating mass associated with ventral abdominal wall fat   could suggest chronic seroma. Clinical correlation recommended for   possibility of infection. 2.  New thickened wall of duodenum with adjacent inflammatory changes suggest   infectious or inflammatory duodenitis. 3.  No bowel obstruction, free air, or free fluid. XR CHEST PORTABLE   Final Result   No acute process. EKG:  This EKG is signed and interpreted by me. Rate: 90  Rhythm: Sinus  Interpretation: non-specific EKG  Comparison: no previous EKG      ------------------------- NURSING NOTES AND VITALS REVIEWED ---------------------------  Date / Time Roomed:  11/4/2020 10:20 AM  ED Bed Assignment:  [id number]    The nursing notes within the ED encounter and vital signs as below have been reviewed.      Patient Vitals for the past 24 hrs:   BP Temp Temp src Pulse Resp SpO2 Height Weight   11/04/20 1849 (!) 152/67 98.4 °F (36.9 °C) Oral 90 18 98 % -- --   11/04/20 1807 (!) 168/72 98.1 °F (36.7 °C) Oral 82 16 97 % -- --   11/04/20 1608 (!) 168/59 -- -- 86 16 97 % -- --   11/04/20 1452 (!) 168/59 -- -- 90 16 96 % -- --   11/04/20 1327 (!) 168/77 -- -- 85 16 99 % -- --   11/04/20 1039 (!) 197/79 98.1 °F (36.7 °C) Oral 96 14 97 % 5' 5\" (1.651 m) 220 lb (99.8 kg)       Oxygen Saturation Interpretation: Normal    ------------------------------------------ PROGRESS NOTES ------------------------------------------  Re-evaluation(s):  Time: 230pm  Patients symptoms are improving  Repeat physical examination is improved    Counseling:  I have spoken with the patient and discussed todays results, in addition to providing specific details for the plan of care and counseling regarding the diagnosis and prognosis. Their questions are answered at this time and they are agreeable with the plan of admission.    --------------------------------- ADDITIONAL PROVIDER NOTES ---------------------------------  Consultations:  Spoke with Dr. Carla Demarco. Discussed case. They will admit the patient. This patient's ED course included: a personal history and physicial examination, re-evaluation prior to disposition, multiple bedside re-evaluations, IV medications, cardiac monitoring and continuous pulse oximetry    This patient has remained hemodynamically stable during their ED course. Diagnosis:  1. Urinary tract infection with hematuria, site unspecified    2. Gastritis and duodenitis    3. Abdominal wall seroma, initial encounter    4. Hyperglycemia    5. Hypokalemia    6. Leukocytosis, unspecified type        Disposition:  Patient's disposition: Admit to telemetry  Patient's condition is stable.               Dayron Salvador MD  Resident  11/04/20 5913

## 2020-11-05 NOTE — CONSULTS
GENERAL SURGERY  CONSULT NOTE  11/5/2020    Physician Consulted: Dr. Jsoefina Alexander  Reason for Consult: Chronic seroma. Referring Physician: Dr. Marquita BELLAMY  Gerri Max is a 69 y.o. female who presented from home due to fatigue weakness for the last 6 days. She was found to have a UTI however she also has an abdominal wound seroma that was been present for some time now with some overlying skin changes. She was seen on 9/10. At that time we recommended against draining the seroma as it was not causing any problems. Again she is still not having any problems or pain from this. She previously had insertional hernia repair with underlay synthetic Veltrix mesh 8/2013 by Dr. Verner Leather. Past Medical History:   Diagnosis Date    Arthritis     Asthma     no recent attacks past 18 months    Colon cancer ([facility] 2006    colon CA    Diabetes mellitus ([facility]     DVT (deep venous thrombosis) ([facility] 2006    Hernia of abdominal cavity     Hypertension     pt states \"pretty good\"; on no meds    Other disorders of kidney and ureter     kidney stone    PE (pulmonary embolism) 2006       Past Surgical History:   Procedure Laterality Date    ABDOMINAL HERNIA REPAIR  8/5/13    CHOLECYSTECTOMY      COLON SURGERY      partial resection    OVARY REMOVAL      bilateral       Medications Prior to Admission    Prior to Admission medications    Medication Sig Start Date End Date Taking? Authorizing Provider   Insulin Aspart Prot & Aspart (NOVOLOG MIX 70/30 SC) Inject 80 Units into the skin 2 times daily (before meals) Follows sliding scale   Yes Historical Provider, MD   ondansetron (ZOFRAN) 4 MG tablet Take 4 mg by mouth every 6 hours as needed for Nausea or Vomiting   Yes Historical Provider, MD   ACETAMINOPHEN-CODEINE #3 PO Take 1 tablet by mouth 2 times daily   Yes Historical Provider, MD   glyBURIDE (DIABETA) 5 MG tablet Take 2 tablets by mouth 2 times daily (with meals).  8/11/13  Yes Marie Hernandez, DO   metformin were the source there is no evidence that there is mesh involvement  If it is absolutely necessary to sample fluid would aspirate only and not place percutaneous drain  [place]

## 2020-11-05 NOTE — H&P
[address] [address]                              HISTORY AND PHYSICAL    PATIENT NAME: Stevan Hernandez                      :        1950  MED REC NO:   [id number]                            ROOM:       0430  ACCOUNT NO:   [de-identified]                           ADMIT DATE: 2020  PROVIDER:     Rama Rodriguez DO    CHIEF COMPLAINT:  Fatigue and weakness. HISTORY OF CHIEF COMPLAINT:  A 67-year-old female patient of mine,  brought into the emergency room via EMS per her . The patient  had a six-day history of weakness, fatigue, not taking medications  properly, and markedly decreased p.o. intake. The patient states that  she is having some abdominal pain and in September was found that she  had a seroma when admitted. Surgery was consulted. They offered to  drain the seroma, but the patient declined to do so at that time. She  has also been experiencing increasing urination and odor due to her  urine per her . The patient has had some skin breakdown from the  urination and she has refused to eat for the past six days. She has had  minimal p.o. intake during that time. She denies any chest pain or  shortness of breath. The patient denies that she has had any confusion. Denies fevers, sweats, or chills and/or no ill contacts. Workup in the ER showed that she was afebrile. White count 17.3 and H  and H of 11.5 and 36.6. Metabolic panel showed a sodium of 130,  potassium 2.9, chloride 92, CO2 21, and BUN and creatinine of 23 and  0.6. Her sugar was 372. Troponin less than 0.01. Lactic acid 1.2. Urine showed large blood, greater than 1000 sugar, positive nitrites,  small leukocytes, moderate bacteria, and 2 to 5 WBCs. Her  beta-hydroxybutyrate was 3.45. Magnesium 1.5.   CT scan of the abdomen  and pelvis showed stable hypoattenuating mass associated with a ventral  abdominal wall, could suggest a chronic seroma. New thickening of the  duodenum with adjacent inflammatory change, suggesting infectious or  inflammatory duodenitis. No bowel obstruction, free air, or free fluid  noted. Chest x-ray showed no acute process. EKG showed sinus rhythm  with a rate of 90. No acute ST or T-wave abnormalities noted. The  patient will be admitted under my service for urinary tract infection,  dehydration, and chronic abdominal wall seroma. I will obtain surgical  consult as well as Infectious Disease and Nephrology. She offers no  other complaints at this time. PAST MEDICAL HISTORY:  1. History of pulmonary embolism. 2.  Type 2 diabetes, uncontrolled. 3.  Hypertension. 4.  Morbid obesity. 5.  Chronic seroma at abdominal wall. 6.  Asthma. 7.  Arthritis. 8.  History of DVT in 2006. PAST SURGICAL HISTORY:  She has:  1. Ovary removed bilaterally. 2.  Colon surgery, partial resection. 3.  Cholecystectomy. 4.  Abdominal hernia repair in 08/2013. ALLERGIES:  She has an allergy to _____ SALMETEROL, causes eye blindness  in the patient. MEDICATIONS PRIOR TO ADMISSION:  She was on NovoLog mix 70/30 80 units  subcutaneous three times before meals, Zofran 4 mg one every six hours  as needed, Tylenol No. 3 one tablet by mouth twice a day, DiaBeta 5 mg  one by mouth twice a day with meals, Glucophage 1000 mg one by mouth  twice a day with meals, Lasix 20 mg daily, Neurontin 800 mg one by mouth  three times a day, and Zaroxolyn 2.5 mg one every other day. SOCIAL HISTORY:  Nondrinker and nonsmoker. No history of illicit drug  use. The patient does not vape. Occupation:  She is retired. She is  . Does have children. FAMILY HISTORY:  Unknown. REVIEW OF SYSTEMS:  As mentioned in chief complaint, otherwise  unremarkable. PHYSICAL EXAMINATION:  VITAL SIGNS:  On admission; temperature 98.1, pulse 96, respirations 14,  blood pressure 197/79, pulse ox 97% on room air. Height 5 feet 5 inches  and weight 220 pounds. BMI is 36.61. GENERAL:  Alert and oriented x3, appears to be in no acute distress. SKIN:  Adequate turgor. No tenting. No rash or wounds. HEENT:  Unremarkable. HEART:  Regular rate and rhythm without murmurs, rubs, or gallops. LUNGS:  Decreased breath sounds in the bilateral bases without rales or  rhonchi. ABDOMEN:  Obese, soft, nontender, and nondistended. Good bowel sounds  throughout. No masses, no organomegaly, and no bruits. EXTREMITIES:  No clubbing, cyanosis, or edema noted. NEUROLOGICAL:  Intact. No focal deficits. Cranial nerves II through  XII grossly intact. MUSCULOSKELETAL:  Appropriate for above-stated age. LABORATORY STUDIES:  Metabolic panel:  Sodium 691, potassium 2.9,  chloride 92, CO2 21, and BUN and creatinine of 23 and 0.6. Lactic acid  1.2. Sugar 398. Troponin less than 0.01. Liver functions unremarkable  except for albumin of 2.6. Her beta-hydroxybutyrate was 3.45. CBC:   White count 17.3, platelets 041, and H and H of 11.5 and 36.6. Urinalysis showed greater than 1000 glucose, large blood, positive for  nitrites, small leukocytes, and protein was 100. She had moderate  bacteria and 2 to 5 wbcs. DIAGNOSTIC STUDIES:  CT of abdomen and pelvis as mentioned in chief  complaint. Portable chest x-ray as read in chief complaint. IMPRESSION:  1. Urinary tract infection. 2.  Leukocytosis. It could be reactive versus infection versus  dehydration. 3.  Dehydration. 4.  Hyperglycemia and known uncontrolled type 2 diabetic. 5.  History of pulmonary embolism/DVT. 6.  Hypertension. 7.  History of asthma. 8.  Diffuse arthritis. 9.  History of colon cancer. 10.  Chronic abdominal seroma as noted on CAT scan of abdomen and  pelvis. PLAN:  The patient will be admitted under my service. We will start  aggressive IV fluids. We will obtain a renal consult for her  dehydration and questionable DKA.   We will have ID consulted for  questionable UTI. We will await urine culture results. We will have  Surgery see again regarding her chronic ceroma per CAT scan results. Monitor blood sugars closely. We will await further recommendations per  consultants and treat accordingly. DISPOSITION:  To home when medically stable.         Shira Lopez DO    D: 11/05/2020 7:01:57       T: 11/05/2020 7:10:09     [email or web address]  Job#: [id number]     Doc#: [id number]    CC:

## 2020-11-05 NOTE — PROGRESS NOTES
Occupational Therapy  OCCUPATIONAL THERAPY INITIAL EVALUATION      Date:2020  Patient Name: Mini Manning  MRN: [id number]  : 1950  Room: [address] [place] [address]-A    Referring Provider: Zaki Meyers DO    Evaluating OT: Artemio Davis OTR/L [id number]    AM-PAC Daily Activity Raw Score: 1424    Recommended Adaptive Equipment: TBD    Diagnosis: UTI. Pt presents to ED from home with fatigue and weakness. Pt in September with abdominal pain found to have seroma. Declined surgery per  report. Wife has been \"picking\" at abdominal wound. Pertinent Medical History: HTN, DM, asthma, arthritis   Precautions:  falls     Home Living: Pt lives with  in a single story home. Bathroom setup: walk in shower, standard commode     Prior Level of Function:  reports pt has not been out of bed in 3 weeks. Pt reports she is primarily independent with ADLs,  assists PRN with IADLs; completed functional mobility with no AD    Pain Level: pt reports being \"sore\" all over    Cognition: A&O: 2/4. Pt is oriented to self and hospital. Pt is disoriented to temporal concepts and situation. Poor recall. Problem solving:  poor   Judgement/safety:  Poor   Direction followin step instruction with mod cues for follow through     Functional Assessment:   Initial Eval Status  Date: 20 Treatment session:  Short Term Goals     Feeding Set up     Grooming Min A  Washing face and hands  Set up   UB Dressing Mod A  Donning/doffing hospital gown  Set up   LB Dressing Max A  Donning socks and brief  Min A    Bathing Max A  Sponge bathing sitting/standing at EOB  Min A   Toileting Max A  Incontinent of urine  Use of grab bar for support in transfer and to maintain balance  Total assist in tayo care and brief management  Min A   Bed Mobility  Supine to sit:  Max A     Functional Transfers STS: Mod A  SBA   Functional Mobility Min A with WW progressing to Mod A  Household distance  SBA during ADLs   Balance Sitting: fair plus    Standing: fair minus/poor plus at [address]     Activity Tolerance Poor plus  standing nancy x6-7 min with fair plus balance during self care tasks   B UE 3+/5  4/5       Treatment: Patient educated on techniques for completion of ADL, safe functional transfers and functional mobility. Patient required cues for follow through with proper hand/foot placement, pacing, safety, attention, sequencing and technique in bed mobility, functional transfers, functional mobility, toileting, grooming, UB dressing, sponge bathing and LB dressing in preparation for maximum independence in all self care tasks.      Hand Dominance: Right []  Left []   Strength ROM Additional Info:    RUE  3+/5 WFL fair  and FMC/dexterity noted during ADL tasks     LUE 3+/5 WFL fair  and FMC/dexterity noted during ADL tasks         Hearing: WFL   Vision: WFL   Sensation:  No c/o numbness or tingling   Tone: WFL                             Long Term Goal (1-3 wks): Pt will maximize functional performance in all self care tasks/functional transfers with good follow through of all trained techniques for safe transition to next level of care    Assessment of current deficits   Functional mobility [x]  ADLs [x] Strength [x]  Cognition []  Functional transfers  [x] IADLs [x] Safety Awareness [x]  Endurance [x]  Fine Motor Coordination [] Balance [x] Vision/perception [] Sensation []   Gross Motor Coordination [] ROM [] Delirium []                  Motor Control []    Plan of Care: 2-5 days/week for 1-2 weeks PRN   [x]ADL retraining/adaptive techniques and AE recommendations to increase functional independence within precautions                    [x]Energy conservation techniques to improve tolerance for ADL/IADLs  [x]Functional transfer/mobility training/DME recommendations for increased independence, safety and fall prevention         [x]Patient/family education to increase safety and functional independence during daily routine [x]Environmental modifications for safe mobility and completion of ADLs                             []Cognitive retraining to improve problem solving skills & safe participation in ADLs/IADLs     []Sensory re-education techniques to improve extremity awareness, maintain skin integrity and improve hand function                             []Visual/Perceptual retraining to improve body awareness and safety during transfers and ADLs  []Splinting/positioning needs to maintain joint/skin integrity and contracture prevention  [x]Therapeutic activity to improve functional performance during ADLs                                        [x]Therapeutic exercise to improve tolerance and functional strength for ADLs   [x]Balance retraining/tolerance tasks for facilitation of postural control with dynamic challenges during ADLs  []Neuromuscular re-education to facilitate righting/equilibrium reactions, midline orientation, scapular stability/mobility, normalize muscle tone and facilitate active functional movement                        []Delirium prevention/treatment    [x]Positioning to improve functional independence and decrease risk of skin breakdown  []Other:     Rehab Potential: Good for established goals     Patient/Family Goal: To get home. Patient and/or family were instructed on functional diagnosis, prognosis/goals and OT plan of care. Pt and  verbalized fair understanding. Upon arrival, patient supine in bed. At end of session, patient seated in reclining chair with call light and phone within reach, all lines and tubes intact. Pt would benefit from continued skilled OT to increase safety and independence with completion of ADL/IADL tasks for functional independence and quality of life.  Bed/chair alarm: ON    Low Evaluation 57258  Time In: 945   Time Out: 16669 Quality [address] Units   Therapeutic Ex 31171     Therapeutic Activities 45771     ADL/Self Care 11468 12 1   Orthotic Management 41799

## 2020-11-05 NOTE — PROGRESS NOTES
11/5/2020  1:55 PM      Comprehensive Nutrition Assessment    Type and Reason for Visit:  Initial, Positive Nutrition Screen    Nutrition Recommendations/Plan: Continue current CHO controlled diet and HP Ensure ONS    Nutrition Assessment:  Pt admit for UTI.  reports pt refused to eat x 6 d PTA also. Recommend continue current CHO controlled diet w/hx DM and hyperglycemia and will initiate ONS to optimize PO    Malnutrition Assessment:  Malnutrition Status: At risk for malnutrition (Comment)    Context:  Acute Illness     Findings of the 6 clinical characteristics of malnutrition:  Energy Intake:  7 - 50% or less of estimated energy requirements for 5 or more days  Weight Loss:  Unable to assess(no EMR actual wt in last year to confirm)     Body Fat Loss:  No significant body fat loss     Muscle Mass Loss:  No significant muscle mass loss    Fluid Accumulation:  No significant fluid accumulation     Strength:  Not Performed    Estimated Daily Nutrient Needs:  Energy (kcal):  ; Weight Used for Energy Requirements:  Current     Protein (g):  75-85 (1.3-1.5 g/kg);  Weight Used for Protein Requirements:  Ideal        Fluid (ml/day):  ; Method Used for Fluid Requirements:  1 ml/kcal      Nutrition Related Findings:  Confused at times, poor appetite, fatigue/weakness, tender abd(seroma) +BS, +1 edema, I/O WNL,      Wounds:  Wound Consult Pending, Open Wounds(seroma on abdomen that pt had been picking at-no surgical intervention planned)       Current Nutrition Therapies:    DIET CARB CONTROL; Carb Control: 4 carbs/meal (approximate 1800 kcals/day)  Dietary Nutrition Supplements: Low Calorie High Protein Supplement    Anthropometric Measures:  · Height: 5' 5\" (165.1 cm)  · Current Body Weight: 220 lb (99.8 kg)(11/5)   · Admission Body Weight: 220 lb (99.8 kg)(11/4)    · Usual Body Weight: (no recent EMR within last year to confirm)     · Ideal Body Weight: 125 lbs; % Ideal Body Weight 176 % · BMI: 36.6  · BMI Categories: Obese Class 2 (BMI 35.0 -39.9)       Nutrition Diagnosis:   · Inadequate oral intake related to other (comment)(poor appetite 2/2 UTI and abdominal pain from seroma) as evidenced by poor intake prior to admission, wounds      Nutrition Interventions:   Food and/or Nutrient Delivery:  Continue Current Diet, Start Oral Nutrition Supplement(HP Ensure BID)  Nutrition Education/Counseling:  No recommendation at this time   Coordination of Nutrition Care:  Continue to monitor while inpatient    Goals:  PO >75% at meals/ONS       Nutrition Monitoring and Evaluation:   Food/Nutrient Intake Outcomes:  Food and Nutrient Intake, Supplement Intake  Physical Signs/Symptoms Outcomes:  Weight, Skin, Nutrition Focused Physical Findings, Biochemical Data     Discharge Planning:     Too soon to determine     Electronically signed by Emmy Homans, RD, CNSC, LD on 11/5/20 at 1:55 PM EST    Contact: [phone]

## 2020-11-05 NOTE — CONSULTS
[facility] MD Yun Castillo, MD Frantz Mujica, MD Toyin Agudelo, CNP   Lakeshia Adams, ANP  Consultation  11/5/2020    Thank you for consult  Full note dictated, to follow  Briefly, 69 y.o. woman admitted with 6-day history of poor intake, nausea, anorexia, abdominal pain the site of the seroma, also dysuria, and has been diagnosed with UTI. Serum sodium on presentation 130 mmol/L. A/R:  1. Hyponatremia, hypovolemic. Improved nicely with IV normal saline resuscitation. Serum creatinine is  within normal limits. 2.  Hypophosphatemia, mild, nutritional    3.   Hypomagnesemia    --> Continue IV fluid  --> Supplement phosphorus, mag po  --> Follow labs, UO    Yun Grewal MD

## 2020-11-05 NOTE — PROGRESS NOTES
Physical Therapy    Facility/Department: [address] INTERMEDIATE 1  Initial Assessment    NAME: Mayela Montes  : 1950  MRN: [id number]    Date of Service: 2020       REQUIRES PT FOLLOW UP: Yes       Patient Diagnosis(es): The primary encounter diagnosis was Urinary tract infection with hematuria, site unspecified. Diagnoses of Gastritis and duodenitis, Abdominal wall seroma, initial encounter, Hyperglycemia, Hypokalemia, and Leukocytosis, unspecified type were also pertinent to this visit. has a past medical history of Arthritis, Asthma, Colon cancer ([facility] Diabetes mellitus ([facility] DVT (deep venous thrombosis) ([facility] Hernia of abdominal cavity, Hypertension, Other disorders of kidney and ureter, and PE (pulmonary embolism). has a past surgical history that includes Ovary removal; Cholecystectomy; Colon surgery; and Abdominal hernia repair (13). Evaluating Therapist: Ian Hummel PT     Referring Provider:  Dr. Carrie Trevizo #:  [id number]  DIAGNOSIS: UTI   PRECAUTIONS: falls    Social:  Pt lives with  Spouse  in a  1  floor plan 2  steps and  1 rails to enter. Prior to admission pt walked with  ww per pt. Pt's spouse arrived at end of eval and states pt has not been getting OOB at home and has been incontinent      Initial Evaluation  Date: 2020 Treatment      Short Term/ Long Term   Goals   Was pt agreeable to Eval/treatment?  with encouragement      Does pt have pain? B LEs, also yells with mobility on occ      Bed Mobility  Rolling: NT   Supine to sit:  Max assist   Sit to supine:  NT   Scooting:  Max assist in sit    min assist    Transfers Sit to stand:   Mod assist   Stand to sit: mod assist   Stand pivot:  NT   CGA    Ambulation    15 feet x 2  with  Mod assist  with  ww    50  feet with  ww  with  CGA       Stair negotiation: ascended and descended NT   TBA    LE ROM  AAROM WFL      LE strength  3- to 3+/ 5   Increased by 1/3 MMT grade    AM- PAC RAW score              Pt is alert and Oriented x  2     Balance: mod assist, high fall risk due to poor activity tolerance, decreased safety awareness, and LE weakness     Endurance: decreased   Bed/Chair alarm: Yes      ASSESSMENT  Pt displays functional ability as noted in the objective portion of this evaluation. Treatment/Education:     Mobility as above. Pt soiled upon arrival and required assist for hygiene. Cues for hand placement with transfers, Cues for posture and ww safety with gait. Needs assist for balance and for ww maneuvering with gait     Pt educated on fall risk,  Safe and proper technique with all mobility        Patient response to education:   Pt verbalized understanding Pt demonstrated skill Pt requires further education in this area   x  with assist   x       Comments:  Pt left  In chair after session, with call light in reach. Rehab potential is Good for reaching above PT goals. Pts/ family goals   1. Per  - increase strength     Patient and or family understand(s) diagnosis, prognosis, and plan of care. -  Yes ( spouse)     PLAN  PT care will be provided in accordance with the objectives noted above. Whenever appropriate, clear delegation orders will be provided for nursing staff. Exercises and functional mobility practice will be used as well as appropriate assistive devices or modalities to obtain goals. Patient and family education will also be administered as needed. PLAN OF CARE:    Current Treatment Recommendations     [x] Strengthening     [] ROM   [x] Balance Training   [x] Endurance Training   [x] Transfer Training   [x] Gait Training   [x] Stair Training   [x] Positioning   [x] Safety and Education Training   [x] Patient/Caregiver Education   [] HEP  [] Other       Frequency of treatments will be 2-5x/week x  7-10 days.     Time in: 0947   Time out: 1008       Evaluation Time includes thorough review of current medical information, gathering information on past medical history/social history and prior level of function, completion of standardized testing/informal observation of tasks, assessment of data and education on plan of care and goals.     CPT codes:  [x] Low Complexity PT evaluation 00131  [] Moderate Complexity PT evaluation 74182  [] High Complexity PT evaluation 04921  [] PT Re-evaluation 29825  [] Gait training 67973  minutes  [x] Therapeutic activities 30552 10 minutes  [] Therapeutic exercises 41845  minutes  [] Neuromuscular reeducation 63751  minutes       Leeanne 18 number:  PT 1803

## 2020-11-05 NOTE — CONSULTS
[facility]     Consult Note                                 [address] [facility] [address], [place], [address]                   Phone [phone]     Fax [phone]        Date:   11/5/2020  Patient name:  Katie Sanchez  Date of admission:  11/4/2020 10:20 AM  MRN:   [id number]  YOB: 1950    Reason for Consult: consult for UTI possible infected seroma vs duodenitis     CC:   Chief Complaint   Patient presents with    Fatigue     ongoing for wk       HISTORY OF PRESENT ILLNESS:                The patient is a 69 y.o. female not known to infectious disease service. She presented with 2 days of weakness that brought her to the hospital.  Currently she is very sleepy and most of the history obtained is from medical records and from the  who is at the bedside. Patient has also developed a seroma over the anterior abdominal wall from her previous incisional site hernia that was done in around 2008. There is also a hernial mesh placed in 2013. She picks up the skin around that and often gets bleeding. She came with abdominal pain on 9/10/2020 and was seen by surgery they recommended against draining the seroma as it was not causing her any problem. She also has a history of colon cancer status post resection at [facility] clinic. There is also a hernial mesh placed    She had a CT abdomen pelvis with IV contrast done this admission but again shows that chronic seroma. It measures 8 x 15 cm. She is afebrile,W BC count on presentation was 17.3, today is 13.7, calcitonin 0.23, blood cultures 1 out of 2 sets from 11/4/2020 is growing GPC in clusters showing staph AUREUS by PCR.   She got 1 dose of ceftriaxone yesterday      Past Medical History:   has a past medical history of Arthritis, Asthma, Colon cancer ([address] Diabetes mellitus ([address] DVT (deep venous thrombosis) ([facility] Hernia of abdominal cavity, Hypertension, Other disorders of kidney and ureter, and PE (pulmonary embolism). Past Surgical History:   has a past surgical history that includes Ovary removal; Cholecystectomy; Colon surgery; and Abdominal hernia repair (8/5/13). Home Medications:    Prior to Admission medications    Medication Sig Start Date End Date Taking? Authorizing Provider   Insulin Aspart Prot & Aspart (NOVOLOG MIX 70/30 SC) Inject 80 Units into the skin 2 times daily (before meals) Follows sliding scale   Yes Historical Provider, MD   ondansetron (ZOFRAN) 4 MG tablet Take 4 mg by mouth every 6 hours as needed for Nausea or Vomiting   Yes Historical Provider, MD   ACETAMINOPHEN-CODEINE #3 PO Take 1 tablet by mouth 2 times daily   Yes Historical Provider, MD   glyBURIDE (DIABETA) 5 MG tablet Take 2 tablets by mouth 2 times daily (with meals). 8/11/13  Yes Marie Hernandez DO   metformin (GLUCOPHAGE) 1000 MG tablet Take 1 tablet by mouth 2 times daily (with meals). 8/11/13  Yes Marie Hernandez DO   NONFORMULARY Takes OTC antihistamine prn   Yes Historical Provider, MD   furosemide (LASIX) 20 MG tablet Take 20 mg by mouth daily. Yes Historical Provider, MD   gabapentin (NEURONTIN) 300 MG capsule Take 800 mg by mouth 3 times daily. Yes Historical Provider, MD   metolazone (ZAROXOLYN) 2.5 MG tablet Take 2.5 mg by mouth every other day. Takes on M-W-F   Yes Historical Provider, MD       Allergies:  Fluticasone-salmeterol    Social History:   reports that she has never smoked. She has never used smokeless tobacco. She reports that she does not drink alcohol or use drugs. Family History: family history is not on file.     REVIEW OF SYSTEMS:    Limited as patient is not alert and oriented        PHYSICAL EXAM:    BP (!) 177/85   Pulse 87   Temp 97.8 °F (36.6 °C) (Oral)   Resp 18   Ht 5' 5\" (1.651 m)   Wt 220 lb (99.8 kg)   SpO2 96%   BMI 36.61 kg/m²    VENT SETTINGS:   Vent Information  SpO2: 96 %    General appearance: Arousing to commands, and deeply not in acute distress   skin: Warm and dry  Eyes: Pink palpebral conjunctivae. PERRL  Ears: External ears normal.  Nose/Sinuses: Nares normal. Septum midline. Mucosa normal. No sinus tenderness. Oropharynx: Oropharynx clear with no exudates seen  Neck: Neck supple. No jugular venous distension, lymphadenopathy or thyromegaly Trachea midline  Lungs: Lungs clear to auscultation bilaterally. No rhonchi, crackles or wheezes  Heart: S1 S2  Regular rate and rhythm. No rub, murmur or gallop  Abdomen: Abdomen soft, distended, midline incision has redness, scaling, old blood, purpuric lesion. Extremities: No edema, Peripheral pulses palpable  Musculoskeletal: Muscular strength appears intact. No joint effusion, tenderness, swelling or warmth      DATA:    Labs:     Last 3 CBC:  Recent Labs     11/04/20  1109 11/05/20  0730   WBC 17.3* 13.7*   RBC 4.50 4.95   HGB 11.5 12.6    283   MPV 10.9 11.2       Last 3 BMP  Recent Labs     11/04/20  1109 11/04/20  1131 11/04/20  2030 11/05/20  0730   *  --  135 140   K 2.9*  --  3.6 3.3*   CL 92*  --  100 103   CO2 21*  --  20* 22   BUN 23  --  19 15   CREATININE 0.6  --  0.6 0.6   GLUCOSE 372* 398 314* 284*   CALCIUM 9.2  --  9.0 9.1       LIVER PROFILE:  Recent Labs     11/04/20  1109 11/05/20  0730   AST 18 23   ALT 13 16   LABALBU 2.6* 2.4*       Microbiology :  No results for input(s): BC in the last 72 hours. Recent Labs     11/04/20  1324   BLOODCULT2 Gram stain performed from blood culture bottle media  Gram positive cocci in clusters  *     No results for input(s): LABURIN in the last 72 hours. No results for input(s): CULTRESP in the last 72 hours. No results for input(s): WNDABS in the last 72 hours. Radiology :  CT ABDOMEN PELVIS W IV CONTRAST Additional Contrast? None   Final Result   1. Stable hypoattenuating mass associated with ventral abdominal wall fat   could suggest chronic seroma.   Clinical correlation recommended for   possibility of infection. 2.  New thickened wall of duodenum with adjacent inflammatory changes suggest   infectious or inflammatory duodenitis. 3.  No bowel obstruction, free air, or free fluid. XR CHEST PORTABLE   Final Result   No acute process. Assessment and Plan:      · Staph aureus bacteremia-source intra-abdominal fluid collections/superficial skin lesions  · History of colon cancer status post partial colectomy  · History of incisional hernia status post mesh and superficial bleeding from abdominal wall  · Abdominal wall fluid collection-concern for infected seroma  · Diabetes mellitus    PLAN  -Keep on cefazolin 2 g every 8 hourly  -The seroma could have become infected by now as She has staph aureus bacteremia. Staph aureus is not a concern for urinary tract infection.  -Recommend IR guided diagnostic tap and depending on the results she might end up needing the mesh removal  -Currently there is no concern for UTI.   She is fecal incontinent and is where she gets recurrent UTIs                    Dank Christina MD

## 2020-11-06 PROBLEM — S30.1XXA ABDOMINAL WALL SEROMA: Status: ACTIVE | Noted: 2020-01-01

## 2020-11-06 PROBLEM — E66.9 CLASS 2 OBESITY IN ADULT: Status: ACTIVE | Noted: 2020-01-01

## 2020-11-06 PROBLEM — E87.1 HYPONATREMIA: Status: ACTIVE | Noted: 2020-01-01

## 2020-11-06 NOTE — DISCHARGE INSTR - COC
Continuity of Care Form    Patient Name: Reji Feliz   :  1950  MRN:  [id number]    Admit date:  2020  Discharge date:  11/10/2020    Code Status Order: Full Code   Advance Directives:   [facility] Documentation     Date/Time Healthcare Directive Type of Healthcare Directive Copy in [address] Agent's Name Healthcare Agent's Phone Number    [phone]  No, patient does not have an advance directive for healthcare treatment -- -- -- -- --          Admitting Physician:  Zoltan Meza DO  PCP: Zoltan Meza DO    Discharging Nurse: [facility] Unit/Room#: 3652/7423-V  Discharging Unit Phone Number: ***    Emergency Contact:   Extended Emergency Contact Information  Primary Emergency Contact: Elle Presser  Address: [address] [place] [address] Phone: [phone]  Mobile Phone: [phone]  Relation: Spouse   needed? No    Past Surgical History:  Past Surgical History:   Procedure Laterality Date    ABDOMINAL HERNIA REPAIR  13    CHOLECYSTECTOMY      COLON SURGERY      partial resection    OVARY REMOVAL      bilateral       Immunization History: There is no immunization history for the selected administration types on file for this patient. Active Problems:  Patient Active Problem List   Diagnosis Code    Hernia, abdominal K46.9    Postoperative incisional hernia K43.2    UTI (urinary tract infection) N39.0    Hypertension I10    DVT (deep venous thrombosis) (HCC) I82.409    Diabetes mellitus ([address] E11.9    Colon cancer ([address] C18.9    Asthma J45.909    Pulmonary embolism (HCC) I26.99    Arthritis M19.90    Hyponatremia E87.1    Abdominal wall seroma S30. 1XXA    Class 2 obesity in adult E66.9       Isolation/Infection:   Isolation          No Isolation        Patient Infection Status     None to display          Nurse Assessment:  Last Vital Signs: BP (!) 146/63   Pulse 79   Temp 97.6 °F (36.4 °C) (Oral)   Resp 18   Ht 5' 5\" (1.651 m)   Wt 214 lb (97.1 kg)   SpO2 94%   BMI 35.61 kg/m²     Last documented pain score (0-10 scale): Pain Level: 0  Last Weight:   Wt Readings from Last 1 Encounters:   11/10/20 214 lb (97.1 kg)     Mental Status:  oriented and alert    IV Access:  - PICC - site  R Basilic, insertion date: 11/10/2020    Nursing Mobility/ADLs:  Walking   Assisted  Transfer  Assisted  Bathing  Assisted  Dressing  Assisted  Toileting  Assisted  Feeding  Independent  Med Admin  Independent  Med Delivery   whole    Wound Care Documentation and Therapy:  Wound 11/04/20 Abdomen (Active)   Dressing Status Clean;Dry; Intact 11/10/20 0749   Wound Cleansed Cleansed with saline 11/09/20 1820   Dressing/Treatment Other (comment) 11/10/20 0749   Dressing Change Due 11/11/20 11/10/20 0749   Wound Length (cm) 8 cm 11/04/20 2000   Wound Width (cm) 8 cm 11/04/20 2000   Wound Depth (cm) 0.1 cm 11/04/20 2000   Wound Surface Area (cm^2) 64 cm^2 11/04/20 2000   Wound Volume (cm^3) 6.4 cm^3 11/04/20 2000   Wound Assessment Other (Comment) 11/10/20 0749   Drainage Amount None 11/10/20 0749   Drainage Description Yellow;Green 11/09/20 1820   Odor None 11/10/20 0749   Velma-wound Assessment Intact; Other (Comment) 11/09/20 1820   Number of days: 5        Elimination:  Continence:   · Bowel: Yes  · Bladder: Yes  Urinary Catheter: None   Colostomy/Ileostomy/Ileal Conduit: No       Date of Last BM: 11/9/20    Intake/Output Summary (Last 24 hours) at 11/10/2020 1349  Last data filed at 11/10/2020 0830  Gross per 24 hour   Intake 1669.2 ml   Output 1200 ml   Net 469.2 ml     I/O last 3 completed shifts: In: 1020.8 [P.O.:360; I.V.:660.8]  Out: 700 [Urine:700]    Safety Concerns:      At Risk for Falls    Impairments/Disabilities:      None    Nutrition Therapy:  Current Nutrition Therapy:   - Oral Diet:  Carb Control 4 carbs/meal (1800kcals/day)    Routes of Feeding: Oral  Liquids: Thin Liquids  Daily Fluid Restriction: no  Last Modified Barium Swallow with Video (Video Swallowing Test): not done    Treatments at the Time of Hospital Discharge:   Respiratory Treatments: ***  Oxygen Therapy:  is not on home oxygen therapy. Ventilator:    - No ventilator support    Rehab Therapies: Physical Therapy and Occupational Therapy  Weight Bearing Status/Restrictions: No weight bearing restirctions  Other Medical Equipment (for information only, NOT a DME order):  ***  Other Treatments: ***    Patient's personal belongings (please select all that are sent with patient):  None    RN SIGNATURE:  Electronically signed by Amanda Spaulding RN on 11/10/20 at 12:40 PM EST    CASE MANAGEMENT/SOCIAL WORK SECTION    Inpatient Status Date: 11/4/2020    Readmission Risk Assessment Score:  Readmission Risk              Risk of Unplanned Readmission:        17           Discharging to Facility/ Agency   · Name: [address] [place] [address]  · Address: [address] [place] [address]  · Phone: [phone]  · Fax: [phone]    Dialysis Facility (if applicable)   · Name:  · Address:  · Dialysis Schedule:  · Phone:  · Fax:    / signature: Electronically signed by SHARLA Ames on 11/6/20 at 11:35 AM EST    PHYSICIAN SECTION    Prognosis: Good    Condition at Discharge: Stable    Rehab Potential (if transferring to Rehab): Good    Recommended Labs or Other Treatments After Discharge: ***    Physician Certification: I certify the above information and transfer of Mini Manning  is necessary for the continuing treatment of the diagnosis listed and that she requires [facility] for less 30 days.      Update Admission H&P: No change in H&P    PHYSICIAN SIGNATURE:  [address] [place] [address] [place], D.O.  6:45 AM  11/10/2020

## 2020-11-06 NOTE — CONSULTS
[facility] MD Francesca Beard, MD Juventino Steel MD Johnna Rily, MARJORIE Whalen  Consultation  Patient's Name: Marino Ochoa  1:19 PM  11/5/2020    Nephrologist: Francesca Banks MD    Reason for Consult: Hyponatremia, dehydration  Requesting Physician:  Maryam Ca DO    Chief Complaint: Fatigue, malaise, generalized weakness    History Obtained From: Patient, chart    History of Present Ilness:     Mrs. Joslyn Mata is a 68-year-old woman admitted with 6-day history of progressive fatigue, malaise, generalized weakness, poor intake, nausea, anorexia, inability to take medications as prescribed. She had intermittent periods of somnolence and difficulty being aroused. She also complains of abdominal pain at the site of the seroma. She also has dysuria, and has been diagnosed with UTI. She denies headache or lightheadedness no chest pain or palpitations, diarrhea or constipation, hematuria, or melena/medic easier. Denies peripheral swelling. Serum sodium on presentation 130 mmol/L, improving to 140 mmol/L as of this morning. BUN and creatinine on presentation were 23 and 0.6, respectively, improving to 15 and 0.6, respectively as of this morning.   She has been treated with IV normal saline since her arrival.  She is feeling a good deal better now than she did at the time of her presentation.       Past Medical History:   Diagnosis Date    Arthritis     Asthma     no recent attacks past 18 months    Colon cancer ([facility] 2006    colon CA    Diabetes mellitus ([facility]     DVT (deep venous thrombosis) ([facility] [facility] 2006    Hernia of abdominal cavity     Hypertension     pt states \"pretty good\"; on no meds    Other disorders of kidney and ureter     kidney stone    PE (pulmonary embolism) 2006       Past Surgical History:   Procedure Laterality Date    ABDOMINAL HERNIA REPAIR  8/5/13    CHOLECYSTECTOMY      COLON SURGERY partial resection    OVARY REMOVAL      bilateral       History reviewed. No pertinent family history. reports that she has never smoked. She has never used smokeless tobacco. She reports that she does not drink alcohol or use drugs. Allergies:  Fluticasone-salmeterol    Current Medications:    glucose (GLUTOSE) 40 % oral gel 15 g, PRN  dextrose 50 % IV solution, PRN  glucagon (rDNA) injection 1 mg, PRN  dextrose 5 % solution, PRN  enoxaparin (LOVENOX) injection 40 mg, Daily  magnesium oxide (MAG-OX) tablet 400 mg, BID  potassium & sodium phosphates (PHOS-NAK) 280-160-250 MG packet 250 mg, 4x Daily  ceFAZolin (ANCEF) 2 g in sterile water 20 mL IV syringe, Q8H  povidone-iodine (BETADINE) 10 % external solution 45 mL, PRN  nystatin (MYCOSTATIN) ointment, BID  gabapentin (NEURONTIN) capsule 800 mg, TID  glyBURIDE (DIABETA) tablet 10 mg, BID WC  metFORMIN (GLUCOPHAGE) tablet 1,000 mg, BID WC  acetaminophen (TYLENOL) tablet 650 mg, Q6H PRN  insulin lispro (HUMALOG) injection vial 0-6 Units, TID WC  insulin lispro (HUMALOG) injection vial 0-3 Units, Nightly  ondansetron (ZOFRAN) injection 4 mg, Q6H PRN  0.9 % sodium chloride infusion, Continuous        Review of Systems:   Pertinent items are noted in HPI. Otherwise unremarkable. Physical exam:   Vital signs reviewed  age-appropriate white woman in no apparent distress  NC/AT EOMI sclera and conjunctiva are clear and anicteric mucous membranes are dry  Neck soft supple trachea midline no carotid bruit  Few scattered crackles throughout all lung fields, coarse breath sounds, though otherwise CTA bilaterally  Regular rhythm normal S1 and S2. No murmur no rub  Abdomen soft nontender nondistended normal active bowel sounds. Distal extremities reveal no edema  Pulses 1+x4   awake alert appropriate  Moves all 4 extremities  Cranial nerves II through XII grossly intact  No rash or lesion on visible portions of integument.     Data:   Reviewed    Imaging:  CT ABDOMEN PELVIS W IV CONTRAST Additional Contrast? None   Final Result   1. Stable hypoattenuating mass associated with ventral abdominal wall fat   could suggest chronic seroma. Clinical correlation recommended for   possibility of infection. 2.  New thickened wall of duodenum with adjacent inflammatory changes suggest   infectious or inflammatory duodenitis. 3.  No bowel obstruction, free air, or free fluid. XR CHEST PORTABLE   Final Result   No acute process. Assessment  69 y.o. woman admitted with 6-day history of poor intake, nausea, anorexia, abdominal pain the site of the seroma, also dysuria, and has been diagnosed with UTI. Serum sodium on presentation 130 mmol/L.    1.  Hyponatremia, hypovolemic. Improved nicely with IV normal saline resuscitation. Serum creatinine is  within normal limits.     2. Hypophosphatemia, mild, nutritional     3. Hypomagnesemia     --> Continue IV fluid  --> Supplement phosphorus, mag po  --> Follow labs, UO      Thank you for the opportunity to participate in the care of your patient. We look forward to following along with you.       Electronically signed by Nisreen Barreto MD

## 2020-11-06 NOTE — PROGRESS NOTES
Aspiration of abdominal wall seroma performed at bedside this AM with 18G needle after prepping the right abdomen with betadine. Will send fluid for analysis. The patient tolerated the procedure well.

## 2020-11-06 NOTE — PROGRESS NOTES
GENERAL SURGERY  DAILY PROGRESS NOTE  11/6/2020    Subjective:  No events overnight states she is tolerating her diet passing gas and having bowel movements. She wants to go home today. Objective:  BP (!) 148/74   Pulse 86   Temp 98.4 °F (36.9 °C) (Oral)   Resp 16   Ht 5' 5\" (1.651 m)   Wt 226 lb 8 oz (102.7 kg)   SpO2 98%   BMI 37.69 kg/m²     Gen: alert, oriented, no apparent distress  HEENT: NCAT, anicteric  CV: RR  Pulm: nonlabored breathing on room air  Abdomen: soft, right flank and abdominal tenderness midline wound is unchanged      Assessment/Plan:  69 y.o. female with chronic seroma and bacteremia from unknown source    We will plan for bedside aspiration of seroma to send for cultures for ID.   Bacteremia management per primary  Okay for diet from surgical standpoint  Discussed with Dr. Kinga Kinney    Electronically signed by Saeid Moran MD on 11/6/2020 at 6:35 AM     Seen/examined  Agree with above  JSGadyMD

## 2020-11-06 NOTE — PROGRESS NOTES
[facility] MD Wade Angelo, MD Roland Uribe MD Nehemiah Spark, SERG Conner, MARJORIE  Progress Note    11/6/2020    SUBJECTIVE:   11/6: Ongoing fatigue, generalized weakness anorexia and poor intake. Ongoing mild abdominal discomfort/pain.   sob/marshall/cp/palp     PROBLEM LIST:    Principal Problem:    Hyponatremia  Active Problems:    Hypertension    DVT (deep venous thrombosis) (HCC)    Diabetes mellitus (HCC)    Colon cancer (HCC)    Asthma    Pulmonary embolism (HCC)    Arthritis    Abdominal wall seroma    Class 2 obesity in adult  Resolved Problems:    * No resolved hospital problems.  *         DIET:    DIET CARB CONTROL; Carb Control: 4 carbs/meal (approximate 1800 kcals/day)  Dietary Nutrition Supplements: Low Calorie High Protein Supplement     MEDS (scheduled):    enoxaparin  40 mg Subcutaneous Daily    magnesium oxide  400 mg Oral BID    potassium & sodium phosphates  1 packet Oral 4x Daily    ceFAZolin  2 g Intravenous Q8H    nystatin   Topical BID    gabapentin  800 mg Oral TID    glyBURIDE  10 mg Oral BID WC    metFORMIN  1,000 mg Oral BID WC    insulin lispro  0-6 Units Subcutaneous TID WC    insulin lispro  0-3 Units Subcutaneous Nightly       MEDS (infusions):   dextrose      sodium chloride 80 mL/hr at 11/05/20 1209       MEDS (prn):  glucose, dextrose, glucagon (rDNA), dextrose, povidone-iodine, acetaminophen, ondansetron    PHYSICAL EXAM:     Patient Vitals for the past 24 hrs:   BP Temp Temp src Pulse Resp SpO2 Height Weight   11/06/20 0845 -- -- -- 90 -- -- -- --   11/06/20 0749 139/85 98.9 °F (37.2 °C) Oral 99 16 96 % -- --   11/06/20 0449 -- -- -- -- -- -- -- 226 lb 8 oz (102.7 kg)   11/06/20 0045 (!) 148/74 98.4 °F (36.9 °C) Oral 86 16 98 % -- --   11/05/20 2000 (!) 153/60 98.2 °F (36.8 °C) Oral 88 16 97 % -- --   11/05/20 1635 (!) 171/73 98.8 °F (37.1 °C) Axillary 92 16 99 % -- --   11/05/20 1343 -- -- -- -- -- -- 5' 5\" (1.651 m) --   @      Intake/Output Summary (Last 24 hours) at 11/6/2020 1327  Last data filed at 11/6/2020 1143  Gross per 24 hour   Intake 480 ml   Output 500 ml   Net -20 ml         Wt Readings from Last 3 Encounters:   11/06/20 226 lb 8 oz (102.7 kg)   09/10/20 225 lb (102.1 kg)   08/01/13 265 lb (120.2 kg)       Constitutional:  in no acute distress  HEENT: NC/AT, EOMI, sclera and conjunctiva are clear and anicteric, mucus membranes moist  Neck: Trachea midline, no JVD  Cardiovascular: S1, S2 regular rhythm, no murmur,or rub  Respiratory:  No crackles, no wheeze  Gastrointestinal:  Soft, mildly diffusely tender, nondistended, NABS  Ext: no edema, feet warm  Skin: dry, no rash  Neuro: awake, alert, interactive      DATA:    Recent Labs     11/04/20  1109 11/05/20  0730 11/06/20  0630   WBC 17.3* 13.7* 14.7*   HGB 11.5 12.6 11.5   HCT 36.6 42.6 37.9   MCV 81.3 86.1 84.6    283 253     Recent Labs     11/04/20  1109 11/04/20  2030 11/05/20  0730 11/06/20  0630   * 135 140 137   K 2.9* 3.6 3.3* 3.3*   CL 92* 100 103 100   CO2 21* 20* 22 23   MG 1.5*  --  1.7 1.4*   PHOS  --   --  2.4* 2.4*   BUN 23 19 15 10   CREATININE 0.6 0.6 0.6 0.6   ALT 13  --  16 12   AST 18  --  23 20   BILITOT 0.5  --  0.4 0.4   ALKPHOS 86  --  127* 107*       Lab Results   Component Value Date    LABPROT 3.5 (H) 11/04/2020    LABPROT 3.5 11/04/2020       ASSESSMENT / RECOMMENDATIONS:    69 y. o. woman admitted with 6-day history of poor intake, nausea, anorexia, abdominal pain the site of the seroma, also dysuria, and has been diagnosed with UTI.  Serum sodium on presentation 130 mmol/L.     1.  Hyponatremia, hypovolemic.  Improved nicely with IV normal saline resuscitation.  Serum creatinine is  within normal limits. Resolved with IV normal saline resuscitation     2.  Hypophosphatemia, mild, nutritional     3.  Hypomagnesemia    4.   Hypokalemia     --> Continue IV NS drip until oral intake improves  --> Supplement phosphorus, mag, potassium po  --> Follow labs, UO      Electronically signed by Celeste De La Cruz MD on 11/6/2020

## 2020-11-06 NOTE — CARE COORDINATION
Social Work/Discharge Planning:  Faustino Jarrell from [facility] can accept patient when medically stable and no pre-cert needed. Faustino Jarrell states patient will negative COVID result prior to discharge. Notified patient and her  of facility acceptance and visitation regulations at facility. HENS completed, electronic N-17 in Epic and Ambulance form in soft chart. Will continue to follow.   Electronically signed by SHARLA Hudson on 11/6/2020 at 11:50 AM

## 2020-11-06 NOTE — PROGRESS NOTES
ID Progress Note                [address] [facility], [address] [facility]            Phone [phone]     Fax [phone]      Chief complaint   unchanged    Subjective:  Continues to have abdominal pain discomfort, generalized fatigue  The patient is awake and alert. Tolerating medications. Reports no side effects. Afebrile. 10 ROS otherwise negative unless otherwise specified above. Objective:    Vitals:    11/06/20 1513   BP: (!) 154/76   Pulse: 90   Resp: 20   Temp: 97.8 °F (36.6 °C)   SpO2:      General appearance: Arousing to commands, and deeply not in acute distress   skin: Warm and dry  Eyes: Pink palpebral conjunctivae. PERRL  Ears: External ears normal.  Nose/Sinuses: Nares normal. Septum midline. Mucosa normal. No sinus tenderness. Oropharynx: Oropharynx clear with no exudates seen  Neck: Neck supple. No jugular venous distension, lymphadenopathy or thyromegaly Trachea midline  Lungs: Lungs clear to auscultation bilaterally. No rhonchi, crackles or wheezes  Heart: S1 S2  Regular rate and rhythm. No rub, murmur or gallop  Abdomen: Abdomen soft, distended, midline incision has redness, scaling, old blood, purpuric lesion. Extremities: No edema, Peripheral pulses palpable  Musculoskeletal: Muscular strength appears intact.  No joint effusion, tenderness, swelling or warmth    Labs:  Recent Labs     11/04/20  1109 11/05/20  0730 11/06/20  0630   WBC 17.3* 13.7* 14.7*   RBC 4.50 4.95 4.48   HGB 11.5 12.6 11.5   HCT 36.6 42.6 37.9   MCV 81.3 86.1 84.6   MCH 25.6* 25.5* 25.7*   MCHC 31.4* 29.6* 30.3*   RDW 14.6 14.7 14.7    283 253   MPV 10.9 11.2 11.3     CMP:    Lab Results   Component Value Date     11/06/2020    K 3.3 11/06/2020    K 2.9 11/04/2020     11/06/2020    CO2 23 11/06/2020    BUN 10 11/06/2020    CREATININE 0.6 11/06/2020    GFRAA >60 11/06/2020    LABGLOM >60 11/06/2020    GLUCOSE 255 11/06/2020    GLUCOSE 149 04/18/2012    PROT 7.0 removal  -Currently there is no concern for UTI.   She has fecal incontinence and is where she gets recurrent UTIs                 Electronically signed by Marianne Gomez MD on 11/6/2020 at 4:37 PM

## 2020-11-06 NOTE — PROGRESS NOTES
General Progress Note  11/6/2020 6:47 AM  Subjective:   Admit Date: 11/4/2020  PCP: Howard Joshi DO  Interval History: no acute issues overnight. Surgery scheduling bedside aspiration of abdominal seroma today. ID, Renal, and surgery input appreciated. Diet: DIET CARB CONTROL; Carb Control: 4 carbs/meal (approximate 1800 kcals/day)  Dietary Nutrition Supplements: Low Calorie High Protein Supplement  Pain is:Mild  Nausea:None  Bowel Movement/Flatus yes    Data:   Scheduled Meds:   enoxaparin  40 mg Subcutaneous Daily    ceFAZolin  2 g Intravenous Q8H    nystatin   Topical BID    gabapentin  800 mg Oral TID    glyBURIDE  10 mg Oral BID WC    metFORMIN  1,000 mg Oral BID WC    insulin lispro  0-6 Units Subcutaneous TID WC    insulin lispro  0-3 Units Subcutaneous Nightly     Continuous Infusions:   dextrose      sodium chloride 80 mL/hr at 11/05/20 1209     PRN Meds:glucose, dextrose, glucagon (rDNA), dextrose, povidone-iodine, acetaminophen, ondansetron  I/O last 3 completed shifts: In: 180 [P.O.:180]  Out: 900 [Urine:900]  No intake/output data recorded.     Intake/Output Summary (Last 24 hours) at 11/6/2020 0647  Last data filed at 11/5/2020 1515  Gross per 24 hour   Intake 180 ml   Output --   Net 180 ml       CBC with Differential:    Lab Results   Component Value Date    WBC 13.7 11/05/2020    RBC 4.95 11/05/2020    HGB 12.6 11/05/2020    HCT 42.6 11/05/2020     11/05/2020    MCV 86.1 11/05/2020    MCH 25.5 11/05/2020    MCHC 29.6 11/05/2020    RDW 14.7 11/05/2020    SEGSPCT 86 08/08/2013    LYMPHOPCT 7.2 11/05/2020    MONOPCT 4.5 11/05/2020    BASOPCT 0.1 11/05/2020    MONOSABS 0.62 11/05/2020    LYMPHSABS 0.98 11/05/2020    EOSABS 0.05 11/05/2020    BASOSABS 0.02 11/05/2020     CMP:    Lab Results   Component Value Date     11/05/2020    K 3.3 11/05/2020    K 2.9 11/04/2020     11/05/2020    CO2 22 11/05/2020    BUN 15 11/05/2020    CREATININE 0.6 11/05/2020    GFRAA >60 11/05/2020    LABGLOM >60 11/05/2020    GLUCOSE 284 11/05/2020    GLUCOSE 149 04/18/2012    PROT 6.9 11/05/2020    LABALBU 2.4 11/05/2020    LABALBU 4.0 04/18/2012    CALCIUM 9.1 11/05/2020    BILITOT 0.4 11/05/2020    ALKPHOS 127 11/05/2020    AST 23 11/05/2020    ALT 16 11/05/2020     Magnesium:    Lab Results   Component Value Date    MG 1.7 11/05/2020     Phosphorus:    Lab Results   Component Value Date    PHOS 2.4 11/05/2020     PT/INR:  No results found for: PROTIME, INR  Last 3 Troponin:    Lab Results   Component Value Date    TROPONINI <0.01 11/04/2020     U/A:    Lab Results   Component Value Date    COLORU Yellow 11/04/2020    PHUR 6.0 11/04/2020    WBCUA 2-5 11/04/2020    WBCUA NONE 12/31/2011    RBCUA 2-5 11/04/2020    RBCUA NONE 12/31/2011    BACTERIA MODERATE 11/04/2020    CLARITYU Clear 11/04/2020    SPECGRAV 1.015 11/04/2020    LEUKOCYTESUR SMALL 11/04/2020    UROBILINOGEN 1.0 11/04/2020    BILIRUBINUR Negative 11/04/2020    BILIRUBINUR NEGATIVE 12/31/2011    BLOODU LARGE 11/04/2020    GLUCOSEU >=1000 11/04/2020    GLUCOSEU 250 12/31/2011     HgBA1c:  No components found for: HGBA1C  TSH:    Lab Results   Component Value Date    TSH 1.380 06/24/2013     -----------------------------------------------------------------    Objective:   Vitals: BP (!) 148/74   Pulse 86   Temp 98.4 °F (36.9 °C) (Oral)   Resp 16   Ht 5' 5\" (1.651 m)   Wt 226 lb 8 oz (102.7 kg)   SpO2 98%   BMI 37.69 kg/m²   General appearance: alert, appears stated age and cooperative  Skin: Skin color, texture, turgor normal. No rashes or lesions  HEENT: Head: Normal, normocephalic, atraumatic.   Neck: no adenopathy, no carotid bruit, no JVD, supple, symmetrical, trachea midline and thyroid not enlarged, symmetric, no tenderness/mass/nodules  Lungs: diminished breath sounds bibasilar  Heart: regular rate and rhythm, S1, S2 normal, no murmur, click, rub or gallop  Abdomen: soft, non-tender; bowel sounds normal; no masses,  no organomegaly  Extremities: extremities normal, atraumatic, no cyanosis or edema  Neurologic: Mental status: Alert, oriented, thought content appropriate    Assessment:   Principal Problem:    Hyponatremia  Active Problems:    Hypertension    DVT (deep venous thrombosis) (HCC)    Diabetes mellitus (HCC)    Colon cancer (HCC)    Asthma    Pulmonary embolism (HCC)    Arthritis    Abdominal wall seroma    Class 2 obesity in adult  Resolved Problems:    * No resolved hospital problems. *    Plan:   1. Continue IV antibiotics per ID.  2. Will have bedside aspiration of abdominal seroma today. 3. ID, Renal, and surgery following.     Wendy Smith D.O.  6:47 AM  11/6/2020

## 2020-11-07 NOTE — PLAN OF CARE
Problem: Falls - Risk of:  Goal: Will remain free from falls  Description: Will remain free from falls  11/7/2020 0022 by Tracy Warren RN  Outcome: Met This Shift     Problem: Falls - Risk of:  Goal: Absence of physical injury  Description: Absence of physical injury  11/7/2020 0022 by Tracy Warren RN  Outcome: Met This Shift

## 2020-11-07 NOTE — PROGRESS NOTES
[facility] Roberto A. Gaston Finger, MD Micky Finn, MD Matias Manas, MD Ulysses Pacer, MD Emelina Funez, CNP   Lilia Conner, MARJORIE  Progress Note    11/7/2020    SUBJECTIVE:   11/6: Ongoing fatigue, generalized weakness anorexia and poor intake. Ongoing mild abdominal discomfort/pain.   sob/marshall/cp/palp     11/7 \" sitting in chair , euvolemic . Reports no issues     PROBLEM LIST:    Principal Problem:    Hyponatremia  Active Problems:    Hypertension    DVT (deep venous thrombosis) (HCC)    Diabetes mellitus (HCC)    Colon cancer (HCC)    Asthma    Pulmonary embolism (HCC)    Arthritis    Abdominal wall seroma    Class 2 obesity in adult  Resolved Problems:    * No resolved hospital problems.  *         DIET:    DIET CARB CONTROL; Carb Control: 4 carbs/meal (approximate 1800 kcals/day)  Dietary Nutrition Supplements: Low Calorie High Protein Supplement     MEDS (scheduled):    potassium chloride  40 mEq Oral Once    enoxaparin  40 mg Subcutaneous Daily    magnesium oxide  400 mg Oral BID    ceFAZolin  2 g Intravenous Q8H    nystatin   Topical BID    gabapentin  800 mg Oral TID    glyBURIDE  10 mg Oral BID WC    metFORMIN  1,000 mg Oral BID WC    insulin lispro  0-6 Units Subcutaneous TID WC    insulin lispro  0-3 Units Subcutaneous Nightly       MEDS (infusions):   dextrose      sodium chloride Stopped (11/07/20 0901)       MEDS (prn):  glucose, dextrose, glucagon (rDNA), dextrose, povidone-iodine, acetaminophen, ondansetron    PHYSICAL EXAM:     Patient Vitals for the past 24 hrs:   BP Temp Temp src Pulse Resp SpO2 Weight   11/07/20 0830 (!) 120/55 97.5 °F (36.4 °C) Oral 89 20 94 % --   11/07/20 0506 -- -- -- -- -- -- 211 lb 2 oz (95.8 kg)   11/06/20 2315 130/61 99.4 °F (37.4 °C) Oral 90 18 -- --   11/06/20 2045 (!) 147/67 97.6 °F (36.4 °C) Oral 96 18 96 % --   11/06/20 1513 (!) 154/76 97.8 °F (36.6 °C) Oral 90 20 -- --   @      Intake/Output Summary (Last 24 hours) at 11/7/2020 1436  Last data filed at 11/7/2020 1316  Gross per 24 hour   Intake 1606 ml   Output 925 ml   Net 681 ml         Wt Readings from Last 3 Encounters:   11/07/20 211 lb 2 oz (95.8 kg)   09/10/20 225 lb (102.1 kg)   08/01/13 265 lb (120.2 kg)       Constitutional:  in no acute distress  HEENT: NC/AT, EOMI, sclera and conjunctiva are clear and anicteric, mucus membranes moist  Neck: Trachea midline, no JVD  Cardiovascular: S1, S2 regular rhythm, no murmur,or rub  Respiratory:  No crackles, no wheeze  Gastrointestinal:  Soft, mildly diffusely tender, nondistended, NABS  Ext: no edema, feet warm  Skin: dry, no rash  Neuro: awake, alert, interactive      DATA:    Recent Labs     11/05/20  0730 11/06/20  0630 11/07/20  0610   WBC 13.7* 14.7* 11.6*   HGB 12.6 11.5 10.0*   HCT 42.6 37.9 33.5*   MCV 86.1 84.6 84.4    253 220     Recent Labs     11/05/20  0730 11/06/20  0630 11/07/20  0610    137 137   K 3.3* 3.3* 3.1*    100 101   CO2 22 23 27   MG 1.7 1.4* 1.6   PHOS 2.4* 2.4* 3.1   BUN 15 10 7*   CREATININE 0.6 0.6 0.6   ALT 16 12 6   AST 23 20 14   BILITOT 0.4 0.4 0.2   ALKPHOS 127* 107* 91       Lab Results   Component Value Date    LABPROT 3.5 (H) 11/04/2020    LABPROT 3.5 11/04/2020       ASSESSMENT / RECOMMENDATIONS:    69 y. o. woman admitted with 6-day history of poor intake, nausea, anorexia, abdominal pain the site of the seroma, also dysuria, and has been diagnosed with UTI.  Serum sodium on presentation 130 mmol/L.     1.  Hyponatremia, hypovolemic.  Improved nicely with IV normal saline resuscitation.  Serum creatinine is  within normal limits. Resolved with IV normal saline resuscitation     2.  Hypophosphatemia, mild, nutritional     3.  Hypomagnesemia    4.   Hypokalemia         Hyponateremia resolved   Replace K   Will f/u PRN       Electronically signed by Javad Newman MD on 11/7/2020

## 2020-11-07 NOTE — PROGRESS NOTES
Admit Date: 11/4/2020    Subjective:     Awake NAD     Objective:     Patient Vitals for the past 8 hrs:   BP Temp Temp src Pulse Resp SpO2 Weight   11/07/20 0830 (!) 120/55 97.5 °F (36.4 °C) Oral 89 20 94 % --   11/07/20 0506 -- -- -- -- -- -- 211 lb 2 oz (95.8 kg)     I/O last 3 completed shifts: In: 2119 [P.O.:1500; I.V.:619]  Out: 1550 [HUJQV:2960]  I/O this shift:  In: -   Out: 500 [Urine:500]    HEENT: Normal  NECK: Thyroid normal. No carotid bruit. No lymphphadenopathy. CVS: RRR  RS: Clear. No wheeze. No rhonchi. ABD: Soft. Non tender. No mass. Normal BS.dressing over incision   EXT: No edema. Non tender. Pulses present.    NEURO:no focal deficit       Scheduled Meds:   enoxaparin  40 mg Subcutaneous Daily    magnesium oxide  400 mg Oral BID    ceFAZolin  2 g Intravenous Q8H    nystatin   Topical BID    gabapentin  800 mg Oral TID    glyBURIDE  10 mg Oral BID WC    metFORMIN  1,000 mg Oral BID WC    insulin lispro  0-6 Units Subcutaneous TID WC    insulin lispro  0-3 Units Subcutaneous Nightly     Continuous Infusions:   dextrose      sodium chloride Stopped (11/07/20 0901)       CBC with Differential:    Lab Results   Component Value Date    WBC 11.6 11/07/2020    RBC 3.97 11/07/2020    HGB 10.0 11/07/2020    HCT 33.5 11/07/2020     11/07/2020    MCV 84.4 11/07/2020    MCH 25.2 11/07/2020    MCHC 29.9 11/07/2020    RDW 14.6 11/07/2020    SEGSPCT 86 08/08/2013    LYMPHOPCT 16.3 11/07/2020    MONOPCT 6.5 11/07/2020    BASOPCT 0.3 11/07/2020    MONOSABS 0.75 11/07/2020    LYMPHSABS 1.89 11/07/2020    EOSABS 0.27 11/07/2020    BASOSABS 0.04 11/07/2020     CMP:    Lab Results   Component Value Date     11/07/2020    K 3.1 11/07/2020    K 2.9 11/04/2020     11/07/2020    CO2 27 11/07/2020    BUN 7 11/07/2020    CREATININE 0.6 11/07/2020    GFRAA >60 11/07/2020    LABGLOM >60 11/07/2020    PROT 6.2 11/07/2020    LABALBU 2.2 11/07/2020    LABALBU 4.0 04/18/2012    CALCIUM 8.5 11/07/2020    BILITOT 0.2 11/07/2020    ALKPHOS 91 11/07/2020    AST 14 11/07/2020    ALT 6 11/07/2020     Culture seroma pending     Assessment:     Principal Problem:    Hyponatremia    Hypertension    DVT (deep venous thrombosis) (HCC)    Diabetes mellitus (HCC)    Colon cancer (HCC)    Asthma    Pulmonary embolism (HCC)    Arthritis    Abdominal wall seroma    Class 2 obesity in adult      Plan:   Continue same pending culture

## 2020-11-07 NOTE — PROGRESS NOTES
ID Progress Note                [address] [facility] [place], [address]            Phone [phone]     Fax [phone]      Chief complaint   unchanged    Subjective: The patient denies having any pain. Appetite is good. Tolerating current antibiotics. Reports no fever. 10 ROS otherwise negative unless otherwise specified above. Objective:    Vitals:    11/07/20 0830   BP: (!) 120/55   Pulse: 89   Resp: 20   Temp: 97.5 °F (36.4 °C)   SpO2: 94%     General appearance: The patient is awake and alert. No distress. Skin: Warm and dry  Eyes: Round pupils. No thrush. Ears: External ears normal.  Nose/Sinuses: Nares normal.  Oropharynx: Oropharynx clear with no exudates seen  Neck: Neck supple. Lungs: No respiratory distress. Good breath sounds. No crackles. Heart: Heart sounds rhythmic and regular. Abdomen: Abdomen soft, distended. Lower abdomen shows a large, raised, superficial wound with some purulent drainage going through the Adaptic. Nontender to palpation. Extremities: No edema.   Musculoskeletal:No joint effusion, tenderness, swelling or warmth    Labs:  Recent Labs     11/05/20  0730 11/06/20  0630 11/07/20  0610   WBC 13.7* 14.7* 11.6*   RBC 4.95 4.48 3.97   HGB 12.6 11.5 10.0*   HCT 42.6 37.9 33.5*   MCV 86.1 84.6 84.4   MCH 25.5* 25.7* 25.2*   MCHC 29.6* 30.3* 29.9*   RDW 14.7 14.7 14.6    253 220   MPV 11.2 11.3 10.9     CMP:    Lab Results   Component Value Date     11/07/2020    K 3.1 11/07/2020    K 2.9 11/04/2020     11/07/2020    CO2 27 11/07/2020    BUN 7 11/07/2020    CREATININE 0.6 11/07/2020    GFRAA >60 11/07/2020    LABGLOM >60 11/07/2020    GLUCOSE 244 11/07/2020    GLUCOSE 149 04/18/2012    PROT 6.2 11/07/2020    LABALBU 2.2 11/07/2020    LABALBU 4.0 04/18/2012    CALCIUM 8.5 11/07/2020    BILITOT 0.2 11/07/2020    ALKPHOS 91 11/07/2020    AST 14 11/07/2020    ALT 6 11/07/2020          Microbiology :  Blood cultures 11/4/2020

## 2020-11-08 NOTE — PROGRESS NOTES
ID Progress Note                [address] [facility], [address] [facility]            Phone [phone]     Fax [phone]      Chief complaint   unchanged    Subjective: The patient continues to tolerate antibiotics without any side effects. Appetite is good. No fevers. Denies abdominal pain. 10 ROS otherwise negative unless otherwise specified above. Objective:    Vitals:    11/08/20 0915   BP: (!) 140/72   Pulse: 88   Resp: 20   Temp: 98.3 °F (36.8 °C)   SpO2: 95%     General appearance: The patient is lying in bed. Awake and in no distress.  present. Skin: Warm and dry  Eyes: Round pupils. No thrush. Ears: External ears normal.  Nose/Sinuses: Nares normal.  Oropharynx: Oropharynx clear with no exudates seen  Neck: Neck supple. Lungs: No respiratory distress. Good breath sounds. No crackles. Heart: Heart sounds rhythmic and regular. Abdomen: Abdomen soft, distended. Lower abdomen shows a large, raised, superficial wound covered with dressing. The dressings were not disturbed. Nontender to palpation. Extremities: No edema. Musculoskeletal: Unremarkable.     Labs:  Recent Labs     11/06/20  0630 11/07/20  0610 11/08/20  0452   WBC 14.7* 11.6* 10.0   RBC 4.48 3.97 3.88   HGB 11.5 10.0* 9.9*   HCT 37.9 33.5* 33.1*   MCV 84.6 84.4 85.3   MCH 25.7* 25.2* 25.5*   MCHC 30.3* 29.9* 29.9*   RDW 14.7 14.6 14.6    220 214   MPV 11.3 10.9 11.4     CMP:    Lab Results   Component Value Date     11/08/2020    K 3.6 11/08/2020    K 2.9 11/04/2020     11/08/2020    CO2 25 11/08/2020    BUN 7 11/08/2020    CREATININE 0.5 11/08/2020    GFRAA >60 11/08/2020    LABGLOM >60 11/08/2020    GLUCOSE 314 11/08/2020    GLUCOSE 149 04/18/2012    PROT 6.1 11/08/2020    LABALBU 2.1 11/08/2020    LABALBU 4.0 04/18/2012    CALCIUM 8.6 11/08/2020    BILITOT <0.2 11/08/2020    ALKPHOS 82 11/08/2020    AST 13 11/08/2020    ALT <5 11/08/2020          Microbiology :  Blood cultures 11/4/2020 MSSA  Blood cultures 11/5/2020: Negative so far  Abdominal seroma 11/6/2020: Pending    Radiology :  CT ABDOMEN PELVIS W IV CONTRAST Additional Contrast? None   Final Result   1. Stable hypoattenuating mass associated with ventral abdominal wall fat   could suggest chronic seroma. Clinical correlation recommended for   possibility of infection. 2.  New thickened wall of duodenum with adjacent inflammatory changes suggest   infectious or inflammatory duodenitis. 3.  No bowel obstruction, free air, or free fluid. XR CHEST PORTABLE   Final Result   No acute process.              Assessment and Plan:      · MSSA bacteremia. Probable intra-abdominal source. · History of colon cancer status post partial colectomy  · History of incisional hernia. Status post mesh and superficial bleeding from abdominal wall. Probable mesh infection  · Abdominal wall fluid collection. Possible infected seroma. Status post aspiration of seroma.   Cultures negative so far  · Diabetes mellitus     PLAN  -Continue Cefazolin  Check final cultures    Spoke with nursing    Electronically signed by Gio Cordon MD on 11/8/2020 at 9:44 AM

## 2020-11-08 NOTE — PROGRESS NOTES
Seen/examined  ID notes reviewed  Gram stain negative for organisms, culture pending  Abdomen soft without cellulitis, chronic open wound  Await final cultures  Discussed possible need for excision of abnormal skin as well as debridement of abdominal wall seroma cavity if cultures are positive  Review of the operative report from Dr. Sanchez Castro shows a composite mesh underlay with a biologic overlay. No evidence of subfascial fluid collection on initial CT scan.   Doubtful mesh would require excision  Stalin Domingo MD

## 2020-11-08 NOTE — PROGRESS NOTES
Admit Date: 11/4/2020    Subjective:     Doing fine no complaint     Objective:     Patient Vitals for the past 8 hrs:   BP Temp Temp src Pulse Resp SpO2 Weight   11/08/20 0915 (!) 140/72 98.3 °F (36.8 °C) Oral 88 20 95 % --   11/08/20 0630 -- -- -- -- -- -- 213 lb 7 oz (96.8 kg)     I/O last 3 completed shifts: In: 2586 [P.O.:540; I.V.:2046]  Out: 6330 [Urine:1450]  I/O this shift:  In: 360 [P.O.:360]  Out: -     HEENT: Normal  NECK: Thyroid normal. No carotid bruit. No lymphphadenopathy. CVS: RRR  RS: Clear. No wheeze. No rhonchi. Good airflow bilaterally. ABD: Soft. Non tender. No mass. Normal BS.dressing lower abdomen   EXT: No edema. Non tender. Pulses present.    NEURO:no focal deficit       Scheduled Meds:   potassium chloride  40 mEq Oral Once    enoxaparin  40 mg Subcutaneous Daily    ceFAZolin  2 g Intravenous Q8H    nystatin   Topical BID    gabapentin  800 mg Oral TID    glyBURIDE  10 mg Oral BID WC    metFORMIN  1,000 mg Oral BID WC    insulin lispro  0-6 Units Subcutaneous TID WC    insulin lispro  0-3 Units Subcutaneous Nightly     Continuous Infusions:   dextrose      sodium chloride 80 mL/hr at 11/08/20 0335       CBC with Differential:    Lab Results   Component Value Date    WBC 10.0 11/08/2020    RBC 3.88 11/08/2020    HGB 9.9 11/08/2020    HCT 33.1 11/08/2020     11/08/2020    MCV 85.3 11/08/2020    MCH 25.5 11/08/2020    MCHC 29.9 11/08/2020    RDW 14.6 11/08/2020    SEGSPCT 86 08/08/2013    LYMPHOPCT 11.2 11/08/2020    MONOPCT 6.5 11/08/2020    BASOPCT 0.3 11/08/2020    MONOSABS 0.65 11/08/2020    LYMPHSABS 1.11 11/08/2020    EOSABS 0.19 11/08/2020    BASOSABS 0.03 11/08/2020     CMP:    Lab Results   Component Value Date     11/08/2020    K 3.6 11/08/2020    K 2.9 11/04/2020     11/08/2020    CO2 25 11/08/2020    BUN 7 11/08/2020    CREATININE 0.5 11/08/2020    GFRAA >60 11/08/2020    LABGLOM >60 11/08/2020    PROT 6.1 11/08/2020    LABALBU 2.1 11/08/2020

## 2020-11-09 PROBLEM — R78.81 MSSA BACTEREMIA: Status: ACTIVE | Noted: 2020-01-01

## 2020-11-09 PROBLEM — B95.61 MSSA BACTEREMIA: Status: ACTIVE | Noted: 2020-01-01

## 2020-11-09 NOTE — PROGRESS NOTES
GENERAL SURGERY  DAILY PROGRESS NOTE  11/9/2020    Subjective:  No events overnight, feels well    Objective:  BP (!) 178/77   Pulse 84   Temp 97.7 °F (36.5 °C) (Oral)   Resp 18   Ht 5' 5\" (1.651 m)   Wt 213 lb 9 oz (96.9 kg)   SpO2 95%   BMI 35.54 kg/m²     Gen: alert, oriented, no apparent distress  HEENT: NCAT, anicteric  CV: RR  Pulm: Nonlabored breathing  Abdomen: soft, nontender, nondistended, no signs of cellulitis. Prior aspiration site clean.   Extremities: moving all extremities,   Skin: warm and dry    Assessment/Plan:  69 y.o. female chronic seroma near prior mesh placement and bacteremia    Await cultures-no organisms seen on Gram stain, no growth seen on cultures to date  Further surgical plans pending cultures  Antibiotics per ID  We will discuss with Dr. Negro Little    Electronically signed by Celestine Mejia MD on 11/9/2020 at 7:42 AM     Seen/examined  Agree with above  [email or web address]

## 2020-11-09 NOTE — CONSULTS
with present laboratory assessment pending in the face of recent metabolic derangements and significant hypoalbuminemia. .    Review of Systems: The remainder of a complete multisystem review including consitutional, central nervous, respiratory, circulatory, gastrointestinal, genitourinary, endocrinologic, hematologic, musculoskeletal and psychiatric are negative. Past Medical History:  Past Medical History:   Diagnosis Date    Arthritis     Asthma     no recent attacks past 18 months    Colon cancer ([facility] [address] 2006    colon CA    Diabetes mellitus ([facility] [address]     DVT (deep venous thrombosis) ([facility] [address] 2006    Hernia of abdominal cavity     Hypertension     pt states \"pretty good\"; on no meds    Other disorders of kidney and ureter     kidney stone    PE (pulmonary embolism) 2006       Past Surgical History:  Past Surgical History:   Procedure Laterality Date    ABDOMINAL HERNIA REPAIR  8/5/13    CHOLECYSTECTOMY      COLON SURGERY      partial resection    OVARY REMOVAL      bilateral       Family History:  History reviewed. No pertinent family history.     Social History:  Social History     Socioeconomic History    Marital status:      Spouse name: Not on file    Number of children: Not on file    Years of education: Not on file    Highest education level: Not on file   Occupational History    Not on file   Social Needs    Financial resource strain: Not on file    Food insecurity     Worry: Not on file     Inability: Not on file    Transportation needs     Medical: Not on file     Non-medical: Not on file   Tobacco Use    Smoking status: Never Smoker    Smokeless tobacco: Never Used   Substance and Sexual Activity    Alcohol use: No    Drug use: No    Sexual activity: Never   Lifestyle    Physical activity     Days per week: Not on file     Minutes per session: Not on file    Stress: Not on file   Relationships    Social connections     Talks on phone: Not on file     Gets together: Not on file     Attends [employer] service: Not on file     Active member of club or organization: Not on file     Attends meetings of clubs or organizations: Not on file     Relationship status: Not on file    Intimate partner violence     Fear of current or ex partner: Not on file     Emotionally abused: Not on file     Physically abused: Not on file     Forced sexual activity: Not on file   Other Topics Concern    Not on file   Social History Narrative    Not on file       Allergies: Allergies   Allergen Reactions    Fluticasone-Salmeterol      Causes eye blindness in patient       Home Medications:  Prior to Admission medications    Medication Sig Start Date End Date Taking? Authorizing Provider   Insulin Aspart Prot & Aspart (NOVOLOG MIX 70/30 SC) Inject 80 Units into the skin 2 times daily (before meals) Follows sliding scale   Yes Historical Provider, MD   ondansetron (ZOFRAN) 4 MG tablet Take 4 mg by mouth every 6 hours as needed for Nausea or Vomiting   Yes Historical Provider, MD   ACETAMINOPHEN-CODEINE #3 PO Take 1 tablet by mouth 2 times daily   Yes Historical Provider, MD   glyBURIDE (DIABETA) 5 MG tablet Take 2 tablets by mouth 2 times daily (with meals). 8/11/13  Yes Marie Hernandez, DO   metformin (GLUCOPHAGE) 1000 MG tablet Take 1 tablet by mouth 2 times daily (with meals). 8/11/13  Yes Marie Hernandez, DO   NONFORMULARY Takes OTC antihistamine prn   Yes Historical Provider, MD   furosemide (LASIX) 20 MG tablet Take 20 mg by mouth daily. Yes Historical Provider, MD   gabapentin (NEURONTIN) 300 MG capsule Take 800 mg by mouth 3 times daily. Yes Historical Provider, MD   metolazone (ZAROXOLYN) 2.5 MG tablet Take 2.5 mg by mouth every other day.  Takes on M-W-F   Yes Historical Provider, MD       Current Medications:  Current Facility-Administered Medications   Medication Dose Route Frequency Provider Last Rate Last Dose    perflutren lipid microspheres (DEFINITY) injection 1.65 mg  1.5 mL Intravenous ONCE PRN Rubin Traylor MD        metoprolol tartrate (LOPRESSOR) tablet 25 mg  25 mg Oral BID Alex Ewing MD   25 mg at 11/09/20 2935    potassium chloride (KLOR-CON M) extended release tablet 40 mEq  40 mEq Oral Once Sheng Hdz MD        glucose (GLUTOSE) 40 % oral gel 15 g  15 g Oral PRN Galileo Joshi, DO        dextrose 50 % IV solution  12.5 g Intravenous PRN Galileo Joshi DO        glucagon (rDNA) injection 1 mg  1 mg Intramuscular PRN Galileo Joshi, DO        dextrose 5 % solution  100 mL/hr Intravenous PRN Galileo Joshi DO        enoxaparin (LOVENOX) injection 40 mg  40 mg Subcutaneous Daily Galileo Joshi DO   40 mg at 11/09/20 8095    ceFAZolin (ANCEF) 2 g in sterile water 20 mL IV syringe  2 g Intravenous Q8H Julita Puente MD   2 g at 11/09/20 2084    povidone-iodine (BETADINE) 10 % external solution 45 mL  45 mL Topical PRN Lupis Miranda MD        nystatin (MYCOSTATIN) ointment   Topical BID Be Ballard MD        gabapentin (NEURONTIN) capsule 800 mg  800 mg Oral TID Lieutenant Jina Joshi DO   800 mg at 11/09/20 5695    glyBURIDE (DIABETA) tablet 10 mg  10 mg Oral BID  Galileo Joshi DO   10 mg at 11/09/20 0821    metFORMIN (GLUCOPHAGE) tablet 1,000 mg  1,000 mg Oral BID  Galileo Joshi DO   1,000 mg at 11/09/20 8992    acetaminophen (TYLENOL) tablet 650 mg  650 mg Oral Q6H PRN Galileo Joshi DO   650 mg at 11/08/20 1458    insulin lispro (HUMALOG) injection vial 0-6 Units  0-6 Units Subcutaneous TID  Galileo Joshi DO   2 Units at 11/09/20 8465    insulin lispro (HUMALOG) injection vial 0-3 Units  0-3 Units Subcutaneous Nightly Galileo Joshi DO   1 Units at 11/08/20 2143    ondansetron (ZOFRAN) injection 4 mg  4 mg Intravenous Q6H PRN Galileo Joshi, DO        0.9 % sodium chloride infusion   Intravenous Continuous Mandi Lazcano MD 80 mL/hr at 11/09/20 6009        dextrose      sodium chloride 80 mL/hr at 11/09/20 5050         Physical Exam:  BP (!) 176/105   Pulse 78   Temp 97.9 °F (36.6 °C) (Oral)   Resp 18   Ht 5' 5\" (1.651 m)   Wt 213 lb 9 oz (96.9 kg)   SpO2 95%   BMI 35.54 kg/m²   Weight change: 2 oz (0.057 kg)  Wt Readings from Last 3 Encounters:   11/09/20 213 lb 9 oz (96.9 kg)   09/10/20 225 lb (102.1 kg)   08/01/13 265 lb (120.2 kg)     The patient is awake, alert and in no discomfort or distress. No gross musculoskeletal deformity or lymphadenopathy are present. No significant skin or nail changes are present. Gross examination of head, eyes, nose and throat are negative. Jugular venous pressure is normal and no carotid bruits are present. No thyromegaly is noted. Normal respiratory effort is noted with no accessory muscle usage present. Lung fields are clear to ascultation. Cardiac examination is notable for a regular rate and rhythm with no palpable thrill. No gallop rhythm or cardiac murmur are identified. A benign abdominal examination is present with the exception of obesity and no masses or organomegaly. A normal abdominal aortic pulsation is present. Intact pulses are present throughout all extremities and no significant peripheral edema is present. No focal neurologic deficits are present. Intake/Output:    Intake/Output Summary (Last 24 hours) at 11/9/2020 1006  Last data filed at 11/9/2020 0925  Gross per 24 hour   Intake 1132 ml   Output 800 ml   Net 332 ml     I/O this shift:  In: 360 [P.O.:360]  Out: -     Laboratory Tests:  Lab Results   Component Value Date    CREATININE 0.5 11/08/2020    BUN 7 (L) 11/08/2020     11/08/2020    K 3.6 11/08/2020     11/08/2020    CO2 25 11/08/2020     No results for input(s): CKTOTAL, CKMB in the last 72 hours.     Invalid input(s): TROPONONI  No results found for: BNP  Lab Results   Component Value Date    WBC 10.0 11/08/2020    RBC 3.88 11/08/2020    HGB 9.9 11/08/2020    HCT 33.1 11/08/2020    MCV 85.3 11/08/2020    MCH 25.5 11/08/2020    MCHC 29.9 11/08/2020    RDW 14.6 11/08/2020     11/08/2020    MPV 11.4 11/08/2020     Recent Labs     11/07/20  0610 11/08/20  0452   ALKPHOS 91 82   ALT 6 <5   AST 14 13   PROT 6.2* 6.1*   BILITOT 0.2 <0.2   LABALBU 2.2* 2.1*     Lab Results   Component Value Date    MG 1.6 11/07/2020     No results found for: PROTIME, INR  Lab Results   Component Value Date    TSH 1.380 06/24/2013     No components found for: CHLPL  Lab Results   Component Value Date    TRIG 143 06/24/2013    TRIG 142 12/03/2012    TRIG 119 04/18/2012     Lab Results   Component Value Date    HDL 39.0 (A) 06/24/2013    HDL 41.0 12/03/2012    HDL 40.0 (A) 04/18/2012     Lab Results   Component Value Date    LDLCALC 114 (H) 06/24/2013    LDLCALC 119 (H) 12/03/2012    LDLCALC 113 (H) 04/18/2012         Cardiac Tests:  ECG: A resting electrocardiogram obtained during present hospitalization reviewed time evaluation demonstrated evidence of sinus rhythm with nonspecific ST changes  Telemetry findings reviewed: sinus rhythm with supraventricular ectopy and transient episodes suggestive of paroxysmal atrial flutter, no new tachy/bradyarrhythmias overnight  Chest X-ray: A chest x-ray reviewed at the time of evaluation demonstrated no evidence of cardiomegaly or infiltrate      ASSESSMENT / PLAN: On a clinical basis, the patient presents initially with that of a noncardiovascular illness in the face of cardiovascular risk factors and five arrhythmia monitoring transient episodes of a narrow complex rhythm consistent with that of paroxysmal atrial flutter with the longest duration of approximately 5 sec prior to a spontaneous termination.   He is presently maintained on a beta-blocker providing acceptable rate control in the face of potential recurrent arrhythmias and based on the transient nature of her arrhythmias as well as that of her acute noncardiovascular illness in spite of a [id number] risk score of a minimum of 4, anticoagulation has not presently been recommended. She otherwise is experiencing no additional cardiovascular symptomatology with an echocardiogram to be obtained to assess the presence or absence of structural abnormalities further contributing to her atrial arrhythmias and no additional needs of cardiovascular assessment. Additional management of her noncardiovascular conditions will be deferred to primary care. On a long-term basis, appropriate lifestyle modification to achieve weight reduction will benefit diastolic cardiac performance and assist in reducing her risk of the development of obstructive sleep apnea with associated adverse cardiovascular effects including that of an increased risk of recurrent atrial arrhythmias. Continued aggressive risk factor modification of blood pressure, diabetes and serum lipids will remain essential to reducing risk of future atherosclerotic development with recommended initiation of appropriate lipid management to further reduce these risks. Thank you for allowing me to participate in your patient's care. Please feel free to contact me if you have any questions or concerns. Note: This report was completed using computerized voice recognition software. Every effort has been made to ensure accuracy, however; inadvertent computerized transcription errors may be present. Beverly Odonnell.  Dorita Trujillo, [address] [facility]

## 2020-11-09 NOTE — PROGRESS NOTES
ID Progress Note                [address] [facility] [address], [place], [address]            Phone [phone]     Fax [phone]      Chief complaint   unchanged    Subjective:  Coughing a lot  Denies abdominal pain. 10 ROS otherwise negative unless otherwise specified above. Objective:    Vitals:    11/09/20 1035   BP: (!) 152/67   Pulse:    Resp:    Temp:    SpO2:      General appearance: The patient is lying in bed. Awake and in no distress.  present. Skin: Warm and dry  Eyes: Round pupils. No thrush. Ears: External ears normal.  Nose/Sinuses: Nares normal.  Oropharynx: Oropharynx clear with no exudates seen  Neck: Neck supple. Lungs: No respiratory distress. Good breath sounds. No crackles. Heart: Heart sounds rhythmic and regular. Abdomen: Abdomen soft, distended. Lower abdomen shows a large, raised, superficial wound covered with dressing. The dressings were not disturbed. Nontender to palpation. Extremities: No edema. Musculoskeletal: Unremarkable.     Labs:  Recent Labs     11/07/20  0610 11/08/20  0452 11/09/20  1040   WBC 11.6* 10.0 9.9   RBC 3.97 3.88 3.63   HGB 10.0* 9.9* 9.4*   HCT 33.5* 33.1* 31.3*   MCV 84.4 85.3 86.2   MCH 25.2* 25.5* 25.9*   MCHC 29.9* 29.9* 30.0*   RDW 14.6 14.6 14.7    214 203   MPV 10.9 11.4 11.1     CMP:    Lab Results   Component Value Date     11/09/2020    K 3.7 11/09/2020    K 2.9 11/04/2020     11/09/2020    CO2 25 11/09/2020    BUN 7 11/09/2020    CREATININE 0.5 11/09/2020    GFRAA >60 11/09/2020    LABGLOM >60 11/09/2020    GLUCOSE 299 11/09/2020    GLUCOSE 149 04/18/2012    PROT 6.0 11/09/2020    LABALBU 2.1 11/09/2020    LABALBU 4.0 04/18/2012    CALCIUM 8.4 11/09/2020    BILITOT <0.2 11/09/2020    ALKPHOS 86 11/09/2020    AST 19 11/09/2020    ALT <5 11/09/2020          Microbiology :  Blood cultures 11/4/2020 MSSA  Blood cultures 11/5/2020: Negative so far  Abdominal seroma 11/6/2020: Pending    Radiology :  CT

## 2020-11-09 NOTE — CARE COORDINATION
Social Work/Discharge Planning:  Called liaison Emily Harden at [facility] and confirmed facility still has a bed available for patient. Patient does not require a pre-cert. Met with patient and her  and confirmed plan remains to discharge to [facility] for rehab. Will continue to follow.    Electronically signed by SHARLA Do on 11/9/2020 at 1:24 PM

## 2020-11-09 NOTE — PROGRESS NOTES
General Progress Note  11/9/2020 6:29 AM  Subjective:   Admit Date: 11/4/2020  PCP: Reyna Joshi DO  Interval History: cardiology consulted last evening for a-flutter. Events of weekend noted. Diet: DIET CARB CONTROL; Carb Control: 4 carbs/meal (approximate 1800 kcals/day)  Dietary Nutrition Supplements: Low Calorie High Protein Supplement  Pain is:None  Nausea:None  Bowel Movement/Flatus yes    Data:   Scheduled Meds:   metoprolol tartrate  25 mg Oral BID    potassium chloride  40 mEq Oral Once    enoxaparin  40 mg Subcutaneous Daily    ceFAZolin  2 g Intravenous Q8H    nystatin   Topical BID    gabapentin  800 mg Oral TID    glyBURIDE  10 mg Oral BID WC    metFORMIN  1,000 mg Oral BID WC    insulin lispro  0-6 Units Subcutaneous TID WC    insulin lispro  0-3 Units Subcutaneous Nightly     Continuous Infusions:   dextrose      sodium chloride 80 mL/hr at 11/09/20 0621     PRN Meds:glucose, dextrose, glucagon (rDNA), dextrose, povidone-iodine, acetaminophen, ondansetron  I/O last 3 completed shifts:   In: 4355 [P.O.:480; I.V.:1028]  Out: 800 [Urine:800]  I/O this shift:  In: -   Out: 100 [Urine:100]    Intake/Output Summary (Last 24 hours) at 11/9/2020 0629  Last data filed at 11/9/2020 0040  Gross per 24 hour   Intake 480 ml   Output 800 ml   Net -320 ml       CBC with Differential:    Lab Results   Component Value Date    WBC 10.0 11/08/2020    RBC 3.88 11/08/2020    HGB 9.9 11/08/2020    HCT 33.1 11/08/2020     11/08/2020    MCV 85.3 11/08/2020    MCH 25.5 11/08/2020    MCHC 29.9 11/08/2020    RDW 14.6 11/08/2020    SEGSPCT 86 08/08/2013    LYMPHOPCT 11.2 11/08/2020    MONOPCT 6.5 11/08/2020    BASOPCT 0.3 11/08/2020    MONOSABS 0.65 11/08/2020    LYMPHSABS 1.11 11/08/2020    EOSABS 0.19 11/08/2020    BASOSABS 0.03 11/08/2020     CMP:    Lab Results   Component Value Date     11/08/2020    K 3.6 11/08/2020    K 2.9 11/04/2020     11/08/2020    CO2 25 11/08/2020    BUN 7 11/08/2020    CREATININE 0.5 11/08/2020    GFRAA >60 11/08/2020    LABGLOM >60 11/08/2020    GLUCOSE 314 11/08/2020    GLUCOSE 149 04/18/2012    PROT 6.1 11/08/2020    LABALBU 2.1 11/08/2020    LABALBU 4.0 04/18/2012    CALCIUM 8.6 11/08/2020    BILITOT <0.2 11/08/2020    ALKPHOS 82 11/08/2020    AST 13 11/08/2020    ALT <5 11/08/2020     Magnesium:    Lab Results   Component Value Date    MG 1.6 11/07/2020     Phosphorus:    Lab Results   Component Value Date    PHOS 3.1 11/07/2020     PT/INR:  No results found for: PROTIME, INR  Last 3 Troponin:    Lab Results   Component Value Date    TROPONINI <0.01 11/04/2020     U/A:    Lab Results   Component Value Date    COLORU Yellow 11/04/2020    PHUR 6.0 11/04/2020    WBCUA 2-5 11/04/2020    WBCUA NONE 12/31/2011    RBCUA 2-5 11/04/2020    RBCUA NONE 12/31/2011    BACTERIA MODERATE 11/04/2020    CLARITYU Clear 11/04/2020    SPECGRAV 1.015 11/04/2020    LEUKOCYTESUR SMALL 11/04/2020    UROBILINOGEN 1.0 11/04/2020    BILIRUBINUR Negative 11/04/2020    BILIRUBINUR NEGATIVE 12/31/2011    BLOODU LARGE 11/04/2020    GLUCOSEU >=1000 11/04/2020    GLUCOSEU 250 12/31/2011     HgBA1c:  No components found for: HGBA1C  TSH:    Lab Results   Component Value Date    TSH 1.380 06/24/2013     -----------------------------------------------------------------    Objective:   Vitals: BP (!) 178/77   Pulse 84   Temp 97.7 °F (36.5 °C) (Oral)   Resp 18   Ht 5' 5\" (1.651 m)   Wt 213 lb 9 oz (96.9 kg)   SpO2 95%   BMI 35.54 kg/m²   General appearance: alert, appears stated age and cooperative  Skin: Skin color, texture, turgor normal. No rashes or lesions  HEENT: Head: Normal, normocephalic, atraumatic.   Neck: no adenopathy, no carotid bruit, no JVD, supple, symmetrical, trachea midline and thyroid not enlarged, symmetric, no tenderness/mass/nodules  Lungs: diminished breath sounds bibasilar  Heart: regular rate and rhythm, S1, S2 normal, no murmur, click, rub or gallop  Abdomen: soft,

## 2020-11-09 NOTE — PROGRESS NOTES
Physical Therapy    Facility/Department: [address] INTERMEDIATE 1  Treatment note    NAME: Catracho Lutz  : 1950  MRN: [id number]    Date of Service: 2020               Patient Diagnosis(es): The primary encounter diagnosis was Urinary tract infection with hematuria, site unspecified. Diagnoses of Gastritis and duodenitis, Abdominal wall seroma, initial encounter, Hyperglycemia, Hypokalemia, and Leukocytosis, unspecified type were also pertinent to this visit. has a past medical history of Arthritis, Asthma, Colon cancer ([facility] [address] Diabetes mellitus ([facility] [address] DVT (deep venous thrombosis) ([facility] [address] Hernia of abdominal cavity, Hypertension, Other disorders of kidney and ureter, and PE (pulmonary embolism). has a past surgical history that includes Ovary removal; Cholecystectomy; Colon surgery; and Abdominal hernia repair (13). Evaluating Therapist: Aroldo Hernnadez, PT     Referring Provider:  Dr. Boston Moise #:  [id number]  DIAGNOSIS: UTI   PRECAUTIONS: falls    Social:  Pt lives with  Spouse  in a  1  floor plan 2  steps and  1 rails to enter. Prior to admission pt walked with  ww per pt. Pt's spouse arrived at end of eval and states pt has not been getting OOB at home and has been incontinent      Initial Evaluation  Date: 2020 Treatment  2020    Short Term/ Long Term   Goals   Was pt agreeable to Eval/treatment?  with encouragement  yes    Does pt have pain? B LEs, also yells with mobility on occ  No complaints    Bed Mobility  Rolling: NT   Supine to sit:  Max assist   Sit to supine:  NT   Scooting:  Max assist in sit  Rolling: NT  Supine to sit: Max A  Scooting: Max A to EOB  min assist    Transfers Sit to stand: Mod assist   Stand to sit: mod assist   Stand pivot:  NT  Sit to stand: Mod A  Stand to sit: Mod A  Stand Pivot;  Mod A using [employer] for support CGA    Ambulation    15 feet x 2  with  Mod assist  with  ww  NT  50  feet with  ww  with  CGA       Stair negotiation: ascended and descended NT   TBA    LE ROM  AAR WFL      LE strength  3- to 3+/ 5   Increased by 1/3 MMT grade    AM- PAC RAW score  11/ 24  10/24            Pt is alert and able to follow instruction  Balance: poor during pivot transfer using [address] for support    Pt performed therapeutic exercise of the following: supine B ankle pumps, heel slides, hip ABd/ADd AROM x 20    Patient education  Pt was educated on exercise promoting circulation, ROM and strengthening, UE usage to assist with transfers, staying within [address] base of support during pivot transfer    Patient response to education:   Pt verbalized understanding Pt demonstrated skill Pt requires further education in this area   yes With instruction yes     ASSESSMENT:   Comments: Nurse ok with Rx. Pt found in bed, agreed to rx. Exercise performed. Pt assisted to EOB, sat EOB CGA for balance. Pivot performed bed to chair using [address] for support with much effort. Pt able to WB and advance LEs but displays poor balance and LE instability. Pt a strong fall risk without support presently, unsafe to attempt gait today. Pt was left in a recliner chair with call light in reach    Chair/bed alarm: chair alarm active    Time in 1330   Time out 1359   Total Treatment Time 29 minutes   CPT codes:     Therapeutic activities 94003 14 minutes   Therapeutic exercises 85659 15 minutes       Pt is showing declining progress toward established Physical Therapy goals, unsafe to gait today. Continue with physical therapy current plan of care.     Duncan Goldberg PTA   License Number: [id number]

## 2020-11-10 NOTE — TELEPHONE ENCOUNTER
----- Message from Amy Holland MD sent at 11/10/2020  7:24 AM EST -----  Please arrange for a 30-day event recorder based on paroxysmal atrial flutter during hospitalization. The patient is likely to be discharged to [facility]-care facility and arrangements may be necessary to have device shipped there. Also patient will need follow-up in approximately 1 month following the completion of her arrhythmia monitoring. Spoke to RN on 800 unit where patient will be going and gave them the information re: the event monitor. Will send out today.

## 2020-11-10 NOTE — PROGRESS NOTES
Occupational Therapy  Patient treatment attempted this PM.  Patient getting PICC line placed and will attempt at a later time.                                                  Johnny Offer CANDI/ALLI [id number]

## 2020-11-10 NOTE — PROGRESS NOTES
non-tender; bowel sounds normal; no masses,  no organomegaly  Extremities: extremities normal, atraumatic, no cyanosis or edema  Neurologic: Mental status: Alert, oriented, thought content appropriate    Assessment:   Principal Problem:    Hyponatremia  Active Problems:    Hypertension    DVT (deep venous thrombosis) (HCC)    Diabetes mellitus (HCC)    Colon cancer (HCC)    Asthma    Pulmonary embolism (HCC)    Arthritis    Abdominal wall seroma    Class 2 obesity in adult    MSSA bacteremia  Resolved Problems:    * No resolved hospital problems. *    Plan:   1. If cultures are negative, and no further surgical intervention is needed, then ok for discharge to [facility].     [employer] [facility] Noel.OMelody  6:45 AM  11/10/2020

## 2020-11-10 NOTE — PROGRESS NOTES
ID Progress Note                [address] [facility] [address], [place], [address]            Phone [phone]     Fax [phone]      Chief complaint   unchanged    Subjective:  Refusing MAGALYS although I explained to her that its done under sedation  She has been also refusing blood draws and last night threatened to leave AMA    10 ROS otherwise negative unless otherwise specified above. Objective:    Vitals:    11/10/20 0749   BP: (!) 146/63   Pulse: 79   Resp: 18   Temp: 97.6 °F (36.4 °C)   SpO2: 94%     General appearance: The patient is lying in bed. Awake and in no distress.  present. Skin: Warm and dry  Eyes: Round pupils. No thrush. Ears: External ears normal.  Nose/Sinuses: Nares normal.  Oropharynx: Oropharynx clear with no exudates seen  Neck: Neck supple. Lungs: No respiratory distress. Good breath sounds. No crackles. Heart: Heart sounds rhythmic and regular. Abdomen: Abdomen soft, distended. Lower abdomen shows a large, raised, superficial wound covered with dressing. The dressings were not disturbed. Nontender to palpation. Extremities: No edema. Musculoskeletal: Unremarkable.     Labs:  Recent Labs     11/08/20  0452 11/09/20  1040   WBC 10.0 9.9   RBC 3.88 3.63   HGB 9.9* 9.4*   HCT 33.1* 31.3*   MCV 85.3 86.2   MCH 25.5* 25.9*   MCHC 29.9* 30.0*   RDW 14.6 14.7    203   MPV 11.4 11.1     CMP:    Lab Results   Component Value Date     11/09/2020    K 3.7 11/09/2020    K 2.9 11/04/2020     11/09/2020    CO2 25 11/09/2020    BUN 7 11/09/2020    CREATININE 0.5 11/09/2020    GFRAA >60 11/09/2020    LABGLOM >60 11/09/2020    GLUCOSE 299 11/09/2020    GLUCOSE 149 04/18/2012    PROT 6.0 11/09/2020    LABALBU 2.1 11/09/2020    LABALBU 4.0 04/18/2012    CALCIUM 8.4 11/09/2020    BILITOT <0.2 11/09/2020    ALKPHOS 86 11/09/2020    AST 19 11/09/2020    ALT <5 11/09/2020          Microbiology :  Blood cultures 11/4/2020 MSSA  Blood cultures 11/5/2020: Negative so far  Abdominal seroma 11/6/2020: Pending    Radiology :  CT ABDOMEN PELVIS W IV CONTRAST Additional Contrast? None   Final Result   1. Stable hypoattenuating mass associated with ventral abdominal wall fat   could suggest chronic seroma. Clinical correlation recommended for   possibility of infection. 2.  New thickened wall of duodenum with adjacent inflammatory changes suggest   infectious or inflammatory duodenitis. 3.  No bowel obstruction, free air, or free fluid. XR CHEST PORTABLE   Final Result   No acute process.              Assessment and Plan:      · MSSA bacteremia. - no intrabdo infection detected  · History of colon cancer status post partial colectomy  · History of incisional hernia. Status post mesh and superficial bleeding from abdominal wall.   - no infection detected in seroma  · Diabetes mellitus     PLAN  -Continue Cefazolin for 4 weeks from last neg bld culture on 11/5, end date- 11/25/202  CANCEL MAGALYS- Pt refusing   picc line  Ok for D/C       Electronically signed by Nicolás Dodson MD on 11/10/2020 at 12:44 PM

## 2020-11-10 NOTE — CARE COORDINATION
CASE MANAGEMENT. ... Chart reviewed. PICC line to be placed today and patient will discharge to [facility].

## 2020-11-10 NOTE — PROGRESS NOTES
Patient educated on importance of staying NPO for possible procedures. Patient is non complient with NPO order for possible procedures in the morning.

## 2020-11-10 NOTE — PROCEDURES
PICC  Catheter insertion date 11/10/2020     Product Number:  [id number]   Lot No: [id number] EXP. DATE: 9/30/21   Gauge: 4 FR   Lumen: SINGL3   R Basilic    Vein Diameter : 0.4 CM   Upper arm circumference (10CM ABOVE AC): 33 CM   Catheter Length : 45 CM   Internal Length: 45 CM   External Catheter Length: 0 CM   Ultrasound Used:YES  Unable to achieve blue bulleye tip confirmation, due to history of A-Fib. Green downward arrow present. Chest xray ordered. : [facility]  CHEST XRAY  REPORTS PICC TIP IS IN THE SVC. FLOOR NURSE NOTIFIED.

## 2020-11-10 NOTE — PROGRESS NOTES
Telemetry has demonstrated no additional atrial arrhythmias with nocturnal episodes of nonconducted supraventricular ectopy in the absence of additional significant bradycardia arrhythmias. Repeat assessment of her metabolic status is pending to assist in reducing risk of arrhythmia recurrence. And no additional cardiovascular assessment is presently indicated and at present based on the transient nature of her atrial arrhythmias as well as that of her acute illness, anticoagulation has not been presently recommended with recommendation of a 30-day period of outpatient arrhythmia monitoring to assess the presence or absence of arrhythmia recurrence and burden to guide additional therapeutic decisions. Ongoing appropriate lifestyle modification to achieve weight reduction will benefit diastolic cardiac performance as well as assisting to reduce the risk and/or management of obstructive sleep apnea and based on the associated adverse consequences of untreated sleep apnea both on diastolic heart failure and arrhythmia recurrence, a formal sleep assessment would be advisable post discharge and will be deferred to primary care. Continued aggressive risk factor modification of blood pressure, diabetes and serum lipids will remain essential to reducing risk of future atherosclerotic development. Additional management during hospitalization will be deferred to primary care as well as that of the infectious disease and surgical services and we will further evaluate her should additional cardiovascular difficulties or concerns arise. Arrangements will be made for outpatient arrhythmia monitoring as outlined above as well as that of clinical follow-up in approximately 1 month.

## 2020-11-10 NOTE — CARE COORDINATION
Social Work/Discharge Planning:  Discharge order noted. Patient to discharge today to [facility], following her PICC placement. Called [facility] and arranged Ambulance transport for 3:00pm.  Notified patient, her , RN and liaison Esthela at [facility] of discharge time.   Electronically signed by SHARLA Caldera on 11/10/2020 at 9:33 AM

## 2020-11-11 NOTE — DISCHARGE SUMMARY
Family Practice Discharge Summary    Mini Manning  :  1950  MRN:  [id number]    Admit date:  2020  Discharge date:  11/10/2020    Admitting Physician:  Zaki Meyers DO    Discharge Diagnoses:    Patient Active Problem List   Diagnosis    Hernia, abdominal    Postoperative incisional hernia    UTI (urinary tract infection)    Hypertension    DVT (deep venous thrombosis) (HCC)    Diabetes mellitus ([address]    Colon cancer ([address]    Asthma    Pulmonary embolism ([address]    Arthritis    Hyponatremia    Abdominal wall seroma    Class 2 obesity in adult    MSSA bacteremia       Admission Condition:  fair    Discharged Condition:  fair    Hospital Course:    A 67-year-old female patient of mine,  brought into the emergency room via EMS per her . The patient  had a six-day history of weakness, fatigue, not taking medications  properly, and markedly decreased p.o. intake. The patient states that  she is having some abdominal pain and in September was found that she  had a seroma when admitted. Surgery was consulted. They offered to  drain the seroma, but the patient declined to do so at that time. She  has also been experiencing increasing urination and odor due to her  urine per her . The patient has had some skin breakdown from the  urination and she has refused to eat for the past six days. She has had  minimal p.o. intake during that time. She denies any chest pain or  shortness of breath. The patient denies that she has had any confusion. Denies fevers, sweats, or chills and/or no ill contacts.     Workup in the ER showed that she was afebrile. White count 17.3 and H  and H of 11.5 and 36.6. Metabolic panel showed a sodium of 130,  potassium 2.9, chloride 92, CO2 21, and BUN and creatinine of 23 and  0.6. Her sugar was 372. Troponin less than 0.01. Lactic acid 1.2.    Urine showed large blood, greater than 1000 sugar, positive nitrites,  small leukocytes, moderate bacteria, and 2 to 5 WBCs. Her  beta-hydroxybutyrate was 3.45. Magnesium 1.5. CT scan of the abdomen  and pelvis showed stable hypoattenuating mass associated with a ventral  abdominal wall, could suggest a chronic seroma. New thickening of the  duodenum with adjacent inflammatory change, suggesting infectious or  inflammatory duodenitis. No bowel obstruction, free air, or free fluid  noted. Chest x-ray showed no acute process. EKG showed sinus rhythm  with a rate of 90. No acute ST or T-wave abnormalities noted. The  patient will be admitted under my service for urinary tract infection,  dehydration, and chronic abdominal wall seroma. I will obtain surgical  consult as well as Infectious Disease and Nephrology. She offers no  other complaints at this time. Discharge Medications:    See medication reconciliation under discharge insructions. Consults:  cardiology, ID, nephrology and general surgery    Significant Diagnostic Studies:  radiology: CT scan:    CT ABDOMEN PELVIS W IV CONTRAST Additional Contrast? None [[id number]]  Collected: 11/04/20 1318       Order Status: Completed  Updated: 11/04/20 1326       Narrative:         EXAMINATION:   CT OF THE ABDOMEN AND PELVIS WITH CONTRAST 11/4/2020 12:32 pm     TECHNIQUE:   CT of the abdomen and pelvis was performed with the administration of   intravenous contrast. Multiplanar reformatted images are provided for review. Dose modulation, iterative reconstruction, and/or weight based adjustment of   the mA/kV was utilized to reduce the radiation dose to as low as reasonably   achievable.      COMPARISON:   September 10, 2020     HISTORY:   ORDERING SYSTEM PROVIDED HISTORY: abdominal wound   TECHNOLOGIST PROVIDED HISTORY:   Reason for exam:->abdominal wound   Additional Contrast?->None     FINDINGS:   There is redemonstration of a circumscribed, hypoattenuating mass associated   with the ventral abdominal subcutaneous fat which measures 8 x 15 cm in AP and axial dimension.  This mass is similar in size compared to previous   examination with partial calcification of its margins.  Postsurgical changes   are also associated with ventral abdominal wall related to hernia repair. There is no bowel obstruction, free air, or free fluid.  There is thickened   wall of the duodenum with surrounding inflammatory changes.  There is   generalized decreased attenuation throughout the liver.  No intrahepatic or   extrahepatic bile duct dilatation. David Lava is evidence of cholecystectomy. Pancreas is grossly unremarkable.  Spleen is nonenlarged.  Adrenal glands are   unremarkable. David Lava is no hydronephrosis. David Lava is bilateral perinephric   fat stranding Claudia similar since prior. David Lava is a stable cyst associated   with the right kidney which measures up to 3.1 cm and is similar in size   compared to the previous examination.  Severe calcified and noncalcified   atherosclerotic plaques associated with the abdominal aorta. David Lava is no   pneumonia or pleural effusion in the lung bases.       Impression:         1.  Stable hypoattenuating mass associated with ventral abdominal wall fat   could suggest chronic seroma.  Clinical correlation recommended for   possibility of infection. 2.  New thickened wall of duodenum with adjacent inflammatory changes suggest   infectious or inflammatory duodenitis. 3.  No bowel obstruction, free air, or          Treatments:   IR performed needle biopsy of chronic seroma in abdominal wall.     Discharge Exam:  BP (!) 146/63   Pulse 79   Temp 97.6 °F (36.4 °C) (Oral)   Resp 18   Ht 5' 5\" (1.651 m)   Wt 214 lb (97.1 kg)   SpO2 94%   BMI 35.61 kg/m²     General Appearance:    Alert, cooperative, no distress, appears stated age   Head:    Normocephalic, without obvious abnormality, atraumatic   Eyes:    PERRL, conjunctiva/corneas clear, EOM's intact, fundi     benign, both eyes   Ears:    Normal TM's and external ear canals, both ears   Nose:   Nares normal, septum midline, mucosa normal, no drainage    or sinus tenderness   Throat:   Lips, mucosa, and tongue normal; teeth and gums normal   Neck:   Supple, symmetrical, trachea midline, no adenopathy;     thyroid:  no enlargement/tenderness/nodules; no carotid    bruit or JVD   Back:     Symmetric, no curvature, ROM normal, no CVA tenderness   Lungs:     Clear to auscultation bilaterally, respirations unlabored   Chest Wall:    No tenderness or deformity    Heart:    Regular rate and rhythm, S1 and S2 normal, no murmur, rub   or gallop   Breast Exam:    No tenderness, masses, or nipple abnormality   Abdomen:     Soft, non-tender, bowel sounds active all four quadrants,     no masses, no organomegaly   Genitalia:    Normal female without lesion, discharge or tenderness   Rectal:    Normal tone ;guaiac negative stool   Extremities:   Extremities normal, atraumatic, no cyanosis or edema   Pulses:   2+ and symmetric all extremities   Skin:   Skin color, texture, turgor normal, no rashes or lesions   Lymph nodes:   Cervical, supraclavicular, and axillary nodes normal   Neurologic:   CNII-XII intact, normal strength, sensation and reflexes     throughout       Disposition:   [facility]. Medication List      START taking these medications    ceFAZolin  infusion  Commonly known as:  ANCEF  Infuse 2,000 mg intravenously every 8 hours for 15 days Compound per protocol     metoprolol tartrate 25 MG tablet  Commonly known as:  LOPRESSOR  Take 1 tablet by mouth 2 times daily     nystatin 629104 UNIT/GM ointment  Commonly known as:  MYCOSTATIN  Apply topically 2 times daily.         CHANGE how you take these medications    albuterol sulfate  (90 Base) MCG/ACT inhaler  Inhale 2 puffs into the lungs every 6 hours as needed for Shortness of Breath  What changed:    · when to take this  · reasons to take this     budesonide-formoterol 160-4.5 MCG/ACT Aero  Commonly known as: SYMBICORT  Inhale 2 puffs into the lungs 2 times daily Hasn't used in 3 months  What changed:    · when to take this  · reasons to take this        CONTINUE taking these medications    ACETAMINOPHEN-CODEINE #3 PO     aspirin 81 MG EC tablet  Take 1 tablet by mouth daily Last dose taken several months ago     furosemide 20 MG tablet  Commonly known as:  LASIX     gabapentin 300 MG capsule  Commonly known as:  NEURONTIN     glyBURIDE 5 MG tablet  Commonly known as:  DIABETA  Take 2 tablets by mouth 2 times daily (with meals). metFORMIN 1000 MG tablet  Commonly known as:  GLUCOPHAGE  Take 1 tablet by mouth 2 times daily (with meals).      metOLazone 2.5 MG tablet  Commonly known as:  ZAROXOLYN     montelukast 10 MG tablet  Commonly known as:  SINGULAIR  Take 1 tablet by mouth nightly     NONFORMULARY     NOVOLOG MIX 70/30 SC     ondansetron 4 MG tablet  Commonly known as:  ZOFRAN     sertraline 25 MG tablet  Commonly known as:  ZOLOFT  Take 4 tablets by mouth daily Instructed to take morning of surgery with a sip of water           Where to Get Your Medications      These medications were sent to [place] [address], [place] - [address] - P [phone] [facility] [phone]  [address] [place] [id number]    Phone:  [phone]   · albuterol sulfate  (90 Base) MCG/ACT inhaler  · aspirin 81 MG EC tablet  · budesonide-formoterol 160-4.5 MCG/ACT Aero  · metoprolol tartrate 25 MG tablet  · montelukast 10 MG tablet  · nystatin 782073 UNIT/GM ointment  · sertraline 25 MG tablet     You can get these medications from any pharmacy    Bring a paper prescription for each of these medications  · ceFAZolin  infusion              Signed:  Colt Persaud D.O.  11/11/2020, 6:25 AM

## 2020-11-20 NOTE — PROGRESS NOTES
Physician Progress Note      [facility]  CSN #:                  [id number]  :                       1950  ADMIT DATE:       2020 10:20 AM  DISCH DATE:        11/10/2020 4:30 PM  RESPONDING  PROVIDER #:        Anuradha KLINE DO          QUERY TEXT:    Dear Attending Physician,    Pt admitted with abdominal pain , weakness, fatigue, nausea, anorexia. Pt   noted to have Dehydration,Hyponatremia, MSSA Bacteremia and chronic abdominal   wall seroma. If possible, please document in progress notes and discharge   summary the relationship, if any, between weakness ,fatigue  and Hyponatremia,   Dehydration, MSSA Bacteremia and chronic abdominal wall seroma. The medical record reflects the following:  Risk Factors: chronic seroma ,Hyponatremia , MSSA Bacteremia  Clinical Indicators: Per H/P,\". Alfie Conner admitted under my service for urinary tract   infection, dehydration, and chronic abdominal wall seroma . Pamalee Bucker \"   Per ID 11/10 ,   \". .MSSA bacteremia. - no intrabdo infection detected . Pamalee Vuer Continue Cefazolin   . Pamalee Bucker \"    Per Surgery ,\". . chronic seroma near prior mesh placement and   bacteremia. Pamalee Vuer Await cultures-no organisms seen on Gram stain, no growth seen on   cultures to date  . Pamalee Bucker \"  Treatment: IV ATB ,IVF    Thank you,  Zurdo Cueto RN [facility]  Clinical Documentation Improvement Specialist  [phone]  Options provided:  -- Weakness, fatigue, nausea, anorexia  due to Dehydration/Hyponatremia  -- Weakness, fatigue, nausea, anorexia  due to MSSA Bacteremia  -- Weakness, fatigue, nausea, anorexia  due other etiology, please document   the etiology. -- Other - I will add my own diagnosis  -- Disagree - Not applicable / Not valid  -- Disagree - Clinically unable to determine / Unknown  -- Refer to Clinical Documentation Reviewer    PROVIDER RESPONSE TEXT:    This patient has weakness, fatigue, nausea, anorexia due to MSSA Bacteremia.     Query created by: Nick Pérez on 2020 9:50 AM      Electronically signed by:  Daina Sood DO 11/20/2020 7:42 AM

## 2020-11-24 PROBLEM — K92.2 GI BLEED: Status: ACTIVE | Noted: 2020-01-01

## 2020-11-24 NOTE — ED NOTES
PER NURSING HOME COVID POSITIVE TEST FROM 11-     Jeffrey Bustos, [address] [facility]  11/24/20 3508

## 2020-11-24 NOTE — PROGRESS NOTES
Physical Therapy    Physical Therapy Initial Assessment     Name: Myrna Pond  : 1950  MRN: [id number]    Referring Provider:  Tj Peralta MD    Date of Service: 2020    Evaluating PT:  Karrie Deysi, PT, DPT [id number]     Room #:  2375/9708-I  Diagnosis:  GI bleed   PMHx/PSHx:  Arthritis, asthma, colon CA, DM, DVT, hernia of abdominal cavity, HTN, kidney stone, PE,  Precautions:  Falls, Droplet Plus isolation, COVID+, O2  Equipment Needs:  TBD    SUBJECTIVE:    Pt lives with  in a 1 story home with 2 stairs to enter and 1 rail. Bedroom and bathroom are on the 1st level. Pt ambulated with [employer] PTA.  assists with ADLs as needed. OBJECTIVE:   Initial Evaluation  Date: 20 Treatment Short Term/ Long Term   Goals   AM-PAC 6 Clicks 53/58     Was pt agreeable to Eval/treatment? Yes     Does pt have pain? No reports of pain -- pt just wants water      Bed Mobility  Rolling: Min A  Supine to sit: Mod A  Sit to supine: Mod A  Scooting: Min A to EOB   Rolling: SBA  Supine to sit: SBA  Sit to supine: SBA  Scooting: SBA   Transfers Sit to stand: Max A  Stand to sit: Max A  Stand pivot: NT  Sit to stand: SBA  Stand to sit: SBA  Stand pivot: SBA with [employer]    Ambulation    Few side steps x3 reps with HHA Max A  >25 feet with [employer] Min A   Stair negotiation: ascended and descended  NT  >2 steps with 1 rail Min A   ROM BUE:  Per OT eval  BLE:  WFL     Strength BUE:  Per OT eval   BLE:  WFL     Balance Sitting EOB:  SBA  Dynamic Standing:   Max A  Sitting EOB:  Independent   Dynamic Standing:  Min A     Pt is A & O x 4  RASS:  +1  CAM-ICU:  Negative   Sensation:  Pt denies numbness and tingling to extremities  Edema:  Unremarkable     Vitals:  Blood Pressure at rest 110/76 Blood Pressure post session 136/58   Heart Rate at rest 87 bpm Heart Rate post session 87 bpm   SPO2 at rest 97% on 2.5 L  SPO2 post session 99% on 2.5       Functional Status Score-Intensive Care Unit (FSS-ICU)   Rolling  Supine to sit transfer 3/7   Unsupported sitting  5/7   Sit to stand transfers 2/7   Ambulation 1/7   Total  15/35     Therapeutic Exercises:     BLE ROM at EOB   STS x3 reps    Patient education  Pt educated on safety during functional mobility, breathing technique, PT role/POC    Patient response to education:   Pt verbalized understanding Pt demonstrated skill Pt requires further education in this area   Yes  Yes  Yes      ASSESSMENT:    Comments:  Pt received supine and agreeable to PT evaluation with OT collaboration. Pt cleared for participation by RN prior to session. Vitals monitored during session. Pt requires assistance of trunk during supine>sit. Once sitting EOB pt was noted to be soiled of bloody BM. RN notified. Pt performed multiple STS from EOB to assist in hygiene while in static unsupported standing. Requires significant assistance to stand as well as max verbal cues for hand/foot placement and upright posture. Pt required multiple seated rest breaks but was cued on side stepping along EOB. Also performed LE ROM while seated at EOB. After stepping to [facility] pt was assisted back to supine. Pt rolled L<>R and assisted with further hygiene and pad/linen change. Pt perseverant on wanting water. Assisted with scooting up in bed and placed in chair position. Pt left with call button in reach, lines attached, and needs met. Discussed pt case at interdisciplinary rounds in AM. Pt would benefit from continued PT services at discharge. RN present on exit. Increased time spent donning/doffing PPE d/t droplet plus isolation to prevent spread of COVID-19.      Treatment:  Patient practiced and was instructed in the following treatment:     Bed mobility training - pt given verbal and tactile cues to facilitate proper sequencing and safety during rolling L<>R and supine<>sit as well as provided with physical assistance to complete task     Sitting EOB for >15 minutes for upright tolerance, postural awareness and BLE ROM   STS training and side stepping at EOB to progress gait tolerance - pt educated on proper hand and foot placement, safety and sequencing, and use of HHA to safely complete sit<>stand and side stepping along EOB with hands on assistance to complete task safely     Skilled positioning - Pt placed in the chair position with pillows utilized to facilitate upright posture, joint and skin integrity, and interaction with environment. Pt's/ family goals   1. None stated     Patient and or family understand(s) diagnosis, prognosis, and plan of care. Yes     PLAN:    Current Treatment Recommendations     [] Strengthening     [] ROM   [x] Balance Training   [x] Endurance Training   [x] Transfer Training   [x] Gait Training   [x] Stair Training   [x] Positioning   [x] Safety and Education Training   [] Patient/Caregiver Education   [] HEP  [] Other     Frequency of treatments: 2-5x/week x 1-2 weeks. Time in  1345  Time out  1415    Total Treatment Time  40 minutes     Evaluation Time includes thorough review of current medical information, gathering information on past medical history/social history and prior level of function, completion of standardized testing/informal observation of tasks, assessment of data and education on plan of care and goals.     CPT codes:  [] Low Complexity PT evaluation 39065  [x] Moderate Complexity PT evaluation 63184  [] High Complexity PT evaluation 84187  [] PT Re-evaluation 11406  [] Gait training 50100 -- minutes  [] Manual therapy 96753 -- minutes  [x] Therapeutic activities 83698 40 minutes  [] Therapeutic exercises 95438 - minutes  [] Neuromuscular reeducation 35706 -- minutes     Lucie De Luna, PT, DPT  [id number]

## 2020-11-24 NOTE — ED PROVIDER NOTES
HPI:  11/24/20, Time: 1:34 AM JAIRO Scott is a 69 y.o. female presenting to the ED for GI bleed, beginning 1 day ago. The complaint has been persistent, severe in severity, and worsened by nothing. Patient was brought in from facility. Patient noted to have tarry stools. Patient currently receiving IV antibiotics. Patient reporting no abdominal pain there was some history of coffee-ground emesis earlier in the day. Patient awake alert oriented to person and place. Patient reporting no chest pain or shortness of breath. There is a history that the patient is Covid positive. Patient also has history of pulmonary embolus. ROS:   Pertinent positives and negatives are stated within HPI, all other systems reviewed and are negative.  --------------------------------------------- PAST HISTORY ---------------------------------------------  Past Medical History:  has a past medical history of Arthritis, Asthma, Colon cancer ([facility] [address] Diabetes mellitus ([facility] [address] DVT (deep venous thrombosis) ([facility] [address] Hernia of abdominal cavity, Hypertension, Other disorders of kidney and ureter, and PE (pulmonary embolism). Past Surgical History:  has a past surgical history that includes Ovary removal; Cholecystectomy; Colon surgery; Abdominal hernia repair (8/5/13); and Insert Picc Line (11/10/2020). Social History:  reports that she has never smoked. She has never used smokeless tobacco. She reports that she does not drink alcohol or use drugs. Family History: family history is not on file. The patients home medications have been reviewed.     Allergies: Fluticasone-salmeterol    ---------------------------------------------------PHYSICAL EXAM--------------------------------------    Constitutional/General: Alert and oriented to person and place,, ill-appearing pale  Head: Normocephalic and atraumatic  Eyes: PERRL, EOMI, conjunctive are pale  Mouth: Oropharynx clear, handling secretions, no trismus  Neck: Supple, full ROM, non tender to palpation in the midline, no stridor, no crepitus, no meningeal signs  Pulmonary: Lungs clear to auscultation bilaterally, no wheezes, rales, or rhonchi. Not in respiratory distress  Cardiovascular: Tachycardic regular rhythm. No murmurs, gallops, or rubs. 2+ distal pulses  Chest: no chest wall tenderness  Abdomen: Tender mid abdomen distended noted mid abdominal hernia. +BS. No rebound, guarding, or rigidity. No pulsatile masses appreciated. Hemoccult grossly positive stool noted to be tarry black noted clots  Musculoskeletal: Moves all extremities x 4. Warm and well perfused, no clubbing, cyanosis, or edema. Capillary refill <3 seconds  Skin: warm and dry. No rashes. Neurologic: Oriented to person and place but not time CN 2-12 grossly intact, no focal deficits, symmetric strength 5/5 in the upper and lower extremities bilaterally  Psych: Normal Affect    -------------------------------------------------- RESULTS -------------------------------------------------  I have personally reviewed all laboratory and imaging results for this patient. Results are listed below.      LABS:  Results for orders placed or performed during the hospital encounter of 11/24/20   CBC auto differential   Result Value Ref Range    WBC 14.0 (H) 4.5 - 11.5 E9/L    RBC 3.24 (L) 3.50 - 5.50 E12/L    Hemoglobin 8.2 (L) 11.5 - 15.5 g/dL    Hematocrit 27.2 (L) 34.0 - 48.0 %    MCV 84.0 80.0 - 99.9 fL    MCH 25.3 (L) 26.0 - 35.0 pg    MCHC 30.1 (L) 32.0 - 34.5 %    RDW 17.0 (H) 11.5 - 15.0 fL    Platelets 972 (H) 856 - 450 E9/L    MPV 10.6 7.0 - 12.0 fL    Neutrophils Absolute 11.62 (H) 1.80 - 7.30 E9/L    Anisocytosis 1+     Polychromasia 1+     Hypochromia 2+     Poikilocytes 1+     [place] Cells 1+     Ovalocytes 1+    Comprehensive Metabolic Panel   Result Value Ref Range    Sodium 140 132 - 146 mmol/L    Potassium 3.5 3.5 - 5.0 mmol/L    Chloride 100 98 - 107 mmol/L    CO2 15 (L) 22 - 29 mmol/L    Anion Blood Bank issued        RADIOLOGY:  Interpreted by Radiologist.  CT ABDOMEN PELVIS W IV CONTRAST Additional Contrast? None   Final Result   Unchanged peripherally calcified cystic mass in the abdominal wall is likely   a chronic seroma. Infiltrates at visualized lung bases. Fatty liver. Coronary artery and aortic calcified plaque. Near resolution of previously seen findings of duodenitis. XR CHEST PORTABLE   Final Result   No acute process. EKG:  This EKG is signed and interpreted by me. Rate: 121  Rhythm: Sinus tachycardia  Interpretation: non-specific EKG  Comparison: compared to previous      ------------------------- NURSING NOTES AND VITALS REVIEWED ---------------------------   The nursing notes within the ED encounter and vital signs as below have been reviewed by myself. BP (!) 110/50   Pulse 107   Temp 96.8 °F (36 °C) (Axillary)   Resp 20   Ht 5' 5\" (1.651 m)   Wt 215 lb (97.5 kg)   SpO2 98%   BMI 35.78 kg/m²   Oxygen Saturation Interpretation: Normal    The patients available past medical records and past encounters were reviewed. ------------------------------ ED COURSE/MEDICAL DECISION MAKING----------------------  Medications   pantoprazole (PROTONIX) injection 40 mg (has no administration in time range)     And   sodium chloride (PF) 0.9 % injection 10 mL (has no administration in time range)   0.9 % sodium chloride bolus (has no administration in time range)   0.9 % sodium chloride bolus (0 mLs Intravenous Stopped 11/24/20 0440)   0.9 % sodium chloride bolus (0 mLs Intravenous Stopped 11/24/20 0813)   iopamidol (ISOVUE-370) 76 % injection 90 mL (90 mLs Intravenous Given 11/24/20 8355)   cefTRIAXone (ROCEPHIN) 1 g in sterile water 10 mL IV syringe (1 g Intravenous New Bag 11/24/20 4491)             Medical Decision Making:   Patient presenting here from outlying facility. Patient being treated for bacteremia.   Patient had coffee-ground emesis earlier today and today was passing dark stools. Patient Hemoccult grossly positive. Patient was noted be hypotensive tachycardic she was given IV fluids. Patient also ordered packed red blood cells. Patient was having mid abdominal pain as well CT abdomen pelvis was noted and reviewed. Patient having no hematemesis here. Patient on monitor and call general surgery and patient was eval by general surgery here in the emergency department. Patient will be admitted to the ICU for further evaluation and treatment. Patient given IV hydration and also repeat lactic acid was ordered and is still pending. Re-Evaluations:             Re-evaluation. Patients symptoms show no change  Reevaluated given IV fluids. Patient noted to be hypotensive. Patient Hemoccult grossly positive. Patient having a mid abdominal pain. Patient was typed and screened initially and then ordered packed red blood cells. Patient blood pressure did drop down into the 80s she did go to CT CT was noted and reviewed and CT shows no new changes compared to prior CT. Patient rechecked again around 4:45 AM and vital signs stable. Patient slightly tachycardic. Patient made aware of findings and plan    Consultations:        I did speak to primary care physician patient will be admitted I spoke to TED Lindsay for general surgery and resident will evaluate resident did come down to evaluate patient. I did speak to the intensivist as well with patient being hypotensive and having active hemorrhage. Patient will be admitted to ICU. Critical Care:     Please note that the withdrawal or failure to initiate urgent interventions for this patient would likely result in a life threatening deterioration or permanent disability. Accordingly this patient received 30 minutes of critical care time, excluding separately billable procedures.       This patient's ED course included: a personal history and physicial eaxmination    This patient has been closely monitored during their ED course. Counseling: The emergency provider has spoken with the patient and discussed todays results, in addition to providing specific details for the plan of care and counseling regarding the diagnosis and prognosis. Questions are answered at this time and they are agreeable with the plan.       --------------------------------- IMPRESSION AND DISPOSITION ---------------------------------    IMPRESSION  1. Gastrointestinal hemorrhage, unspecified gastrointestinal hemorrhage type    2. Lactic acidosis    3. COVID-19 virus infection        DISPOSITION  Disposition: Admit to CCU/ICU  Patient condition is fair        NOTE: This report was transcribed using voice recognition software.  Every effort was made to ensure accuracy; however, inadvertent computerized transcription errors may be present          Roselyn Quintero MD  11/24/20 2616

## 2020-11-24 NOTE — ED NOTES
Bed: 05  Expected date:   Expected time:   Means of arrival: [facility]  Comments:  Mat Lopez RN  11/24/20 1206

## 2020-11-24 NOTE — PROGRESS NOTES
[facility] Quality Flow/Interdisciplinary Rounds Progress Note        Quality Flow Rounds held on November 24, 2020    Disciplines Attending:  Bedside RN, charge nurse, , unit leadership, PT, OT    Angelique Johnson was admitted on 11/24/2020  1:25 AM    Anticipated Discharge Date:       Disposition:    Ken Score:       Readmission Risk              Risk of Unplanned Readmission:        26           Discussed patient goal for the day, patient clinical progression, and barriers to discharge. The following Goal(s) of the Day/Commitment(s) have been identified:  ID consulted. Check transfer on rounds. Check reswab.     Charissa Gromadeline  November 24, 2020

## 2020-11-24 NOTE — CONSULTS
[facility]  Internal Medicine Residency Program  CONSULT MICU    Patient:  Luann Allison 69 y.o. female MRN: [id number]     Date of Service: 11/24/2020    Hospital Day: 1      Chief complaint: GI Bleed   History of Present Illness   The patient is a 69 y.o. 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 Of note, the pt is intermittently confused and is uncooperative with questioning - the majority of this HPI was taken via note review. Per documentation, the pt was discharged approximately 2 weeks ago on Ancef infusion for MSSA bacteremia, source unknown. She was also found to be positive for COVID pneumonia on 11/17. Yesterday, she was sent here by her SNF [facility]) for coffee-ground emesis x1 and melanotic stool. In the ED, the pt had another melanotic BM, FOBT grossly positive. She was tachypneic and hypotensive, and as such was given 2L IVF and 1U PRBCs was ordered. Remainder of labs were notable for Hgb 8.2,  CO2 15, Cr 1.1 (baseline 0.5-0.6), Lactic acid 10.6, PT/INR of 27.2/2.4. CXR showing no acute cardiopulmonary process. CT abdomen showing hepatic steatosis, a stable R renal cyst, and a peripherally calcified cystic mass in the anterior abdominal wall, likely a chronic seroma. Aside from IVF and PRBCs, the pt's Ancef was restarted and General Surgery was consulted. She was then transferred to the MICU for further medical management and stabilization.      Past Medical History:      Diagnosis Date    Arthritis     Asthma     no recent attacks past 18 months    Colon cancer ([facility] [address] 2006    colon CA    Diabetes mellitus ([facility] [address]     DVT (deep venous thrombosis) ([facility] [address] 2006    Hernia of abdominal cavity     Hypertension     pt states \"pretty good\"; on no meds    Other disorders of kidney and ureter     kidney stone    PE (pulmonary embolism) 2006     Past Surgical History:        Procedure Laterality Date    ABDOMINAL HERNIA REPAIR  8/5/13    CHOLECYSTECTOMY      COLON SURGERY      partial resection    INSERT PICC LINE  11/10/2020         OVARY REMOVAL      bilateral     Medications Prior to Admission:    Prior to Admission medications    Medication Sig Start Date End Date Taking? Authorizing Provider   montelukast (SINGULAIR) 10 MG tablet Take 1 tablet by mouth nightly 11/10/20   Pacheco Joshi DO   budesonide-formoterol (SYMBICORT) 160-4.5 MCG/ACT AERO Inhale 2 puffs into the lungs 2 times daily Hasn't used in 3 months 11/10/20   Pacheco Joshi DO   aspirin 81 MG EC tablet Take 1 tablet by mouth daily Last dose taken several months ago 11/10/20   Pacheco Joshi DO   albuterol sulfate  (90 Base) MCG/ACT inhaler Inhale 2 puffs into the lungs every 6 hours as needed for Shortness of Breath 11/10/20   Galileo Joshi DO   sertraline (ZOLOFT) 25 MG tablet Take 4 tablets by mouth daily Instructed to take morning of surgery with a sip of water 11/10/20   Pacheco Joshi DO   metoprolol tartrate (LOPRESSOR) 25 MG tablet Take 1 tablet by mouth 2 times daily 11/10/20   Galileo Joshi DO   nystatin (MYCOSTATIN) 230404 UNIT/GM ointment Apply topically 2 times daily.  11/10/20   Galileo Joshi DO   ceFAZolin (ANCEF) infusion Infuse 2,000 mg intravenously every 8 hours for 15 days Compound per protocol 11/10/20 11/25/20  Dank Christina MD   Insulin Aspart Prot & Aspart (NOVOLOG MIX 70/30 SC) Inject 80 Units into the skin 2 times daily (before meals) Follows sliding scale    Historical Provider, MD   ondansetron (ZOFRAN) 4 MG tablet Take 4 mg by mouth every 6 hours as needed for Nausea or Vomiting    Historical Provider, MD   ACETAMINOPHEN-CODEINE #3 PO Take 1 tablet by mouth 2 times daily    Historical Provider, MD glyBURIDE (DIABETA) 5 MG tablet Take 2 tablets by mouth 2 times daily (with meals). 8/11/13   Marie Bucnoemi DO   metformin (GLUCOPHAGE) 1000 MG tablet Take 1 tablet by mouth 2 times daily (with meals). 8/11/13   Marie Doylenoemi DO   NONFORMULARY Takes OTC antihistamine prn    Historical Provider, MD   furosemide (LASIX) 20 MG tablet Take 20 mg by mouth daily. Historical Provider, MD   gabapentin (NEURONTIN) 300 MG capsule Take 800 mg by mouth 3 times daily. Historical Provider, MD   metolazone (ZAROXOLYN) 2.5 MG tablet Take 2.5 mg by mouth every other day. Takes on M-W-F    Historical Provider, MD     Allergies:  Fluticasone-salmeterol    Social History:   TOBACCO:   reports that she has never smoked. She has never used smokeless tobacco.  ETOH:   reports no history of alcohol use. Family History:   History reviewed. No pertinent family history. REVIEW OF SYSTEMS: Full RoS unable to be obtained d/t pt's lack of cooperation    · Constitutional: No fever, no chills  · Respiratory: Endorses cough and dyspnea. · Cardiology: Endorses palpitations, no angina   · Gastroenterology: Endorses nausea and bloody emesis x1,   · Genitourinary: Denies pain at site of Smith insertion    Physical Exam   Vitals: BP (!) 149/83   Pulse 79   Temp 98.1 °F (36.7 °C)   Resp 23   Ht 5' 5\" (1.651 m)   Wt 191 lb (86.6 kg)   SpO2 100%   BMI 31.78 kg/m²     Constitutional: Alert, AaO x2 (person and time). Uncooperative w/ questions. Follows commands. Appears uncomfortable. Head: Normocephalic and atraumatic. Eyes: (-) conjunctivitis (-) scleral icterus. Mucus membranes moist. No discharge   Mouth: Mucus membranes moist. Oropharynx clear. No deviation of the tongue or uvula. Neck: (-)  Swelling. Supple,   Respiratory: Diffuse crackles heard bilaterally, no wheeze, no increased work of breathing or respiratory distress. Cardiovascular: Tachycardic rate, regular rhythm. (-)  murmurs, (-) gallops,  (-) rubs.  S1 and S2 were normal.   GI:  Diffuse TTP. Midline mass noted, compatible w/ seroma. Abdomen soft, (-) distention. (+) soft BS. (-) guarding, (-) rigidity. Extremities: Warm and well perfused. (-) clubbing, (-) cyanosis. (-) peripheral edema.    Neurologic:  Appears confused, A&Ox2 (person and time), no focal neurological deficits, no speech slurring or tremor     Lines     site day    Art line   None    TLC None    PICC R Arm 13   Hemoaccess None    Oxygen:     nasal canula at 2L/min    Labs and Imaging Studies   Basic Labs    CBC with Differential:    Lab Results   Component Value Date    WBC 12.1 11/24/2020    RBC 3.14 11/24/2020    HGB 7.2 11/24/2020    HCT 23.1 11/24/2020     11/24/2020    MCV 83.4 11/24/2020    MCH 26.4 11/24/2020    MCHC 31.7 11/24/2020    RDW 16.2 11/24/2020    SEGSPCT 86 08/08/2013    LYMPHOPCT 11.4 11/24/2020    MONOPCT 4.4 11/24/2020    BASOPCT 0.1 11/24/2020    MONOSABS 0.53 11/24/2020    LYMPHSABS 1.37 11/24/2020    EOSABS 0.02 11/24/2020    BASOSABS 0.01 11/24/2020     CMP:    Lab Results   Component Value Date     11/24/2020    K 3.2 11/24/2020     11/24/2020    CO2 21 11/24/2020    BUN 28 11/24/2020    CREATININE 1.0 11/24/2020    GFRAA >60 11/24/2020    LABGLOM 55 11/24/2020    GLUCOSE 224 11/24/2020    GLUCOSE 149 04/18/2012    PROT 5.8 11/24/2020    LABALBU 2.1 11/24/2020    LABALBU 4.0 04/18/2012    CALCIUM 7.6 11/24/2020    BILITOT 0.4 11/24/2020    ALKPHOS 63 11/24/2020    AST 30 11/24/2020    ALT <5 11/24/2020     Last 3 Troponin:    Lab Results   Component Value Date    TROPONINI <0.01 11/24/2020    TROPONINI <0.01 11/24/2020    TROPONINI <0.01 11/24/2020     U/A:    Lab Results   Component Value Date    COLORU Yellow 11/24/2020    PROTEINU 30 11/24/2020    PHUR 6.5 11/24/2020    WBCUA >20 11/24/2020    WBCUA NONE 12/31/2011    RBCUA 1-3 11/24/2020    RBCUA NONE 12/31/2011    BACTERIA FEW 11/24/2020    CLARITYU Clear 11/24/2020    SPECGRAV 1.015 11/24/2020 LEUKOCYTESUR SMALL 11/24/2020    UROBILINOGEN 0.2 11/24/2020    BILIRUBINUR Negative 11/24/2020    BILIRUBINUR NEGATIVE 12/31/2011    BLOODU Negative 11/24/2020    GLUCOSEU Negative 11/24/2020    GLUCOSEU 250 12/31/2011     WBC, UA  >20Abnormal    0 - 5 /HPF  Final  11/24/2020  2:07 AM  [facility]    RBC, UA  1-3  0 - 2 /HPF  Final  11/24/2020  2:07 AM  [facility]    Epithelial Cells, UA  RARE  /HPF  Final  11/24/2020  2:07 AM  [facility]    Bacteria, UA  FEWAbnormal    None Seen /HPF  Final  11/24/2020  2:07 AM  [facility]      LIPASE:    Lab Results   Component Value Date    LIPASE 22 11/24/2020     Lactic Acid   Lactic Acid  10. 6High Panic    0.5 - 2.2 mmol/L  Final  11/24/2020  2:07 AM  [facility]      Imaging Studies:     Ct Abdomen Pelvis W Iv Contrast Additional Contrast? None    Result Date: 11/24/2020  EXAMINATION: CT OF THE ABDOMEN AND PELVIS WITH CONTRAST 11/24/2020 3:55 am TECHNIQUE: CT of the abdomen and pelvis was performed with the administration of intravenous contrast. Multiplanar reformatted images are provided for review. Dose modulation, iterative reconstruction, and/or weight based adjustment of the mA/kV was utilized to reduce the radiation dose to as low as reasonably achievable. COMPARISON: 11/04/2020 HISTORY: ORDERING SYSTEM PROVIDED HISTORY: Abdominal pain GI bleed TECHNOLOGIST PROVIDED HISTORY: Reason for exam:->Abdominal pain GI bleed Additional Contrast?->None What reading provider will be dictating this exam?->CRC FINDINGS: Lower Chest: There are mild peripheral infiltrates in the visualized lower lungs. There are coronary artery calcifications. Organs: There is fatty infiltration of the liver. Patient is status post cholecystectomy. The visualized spleen, pancreas, adrenal glands and kidneys demonstrate no significant abnormality.   There is an unchanged right renal cyst. GI/Bowel: There are no findings of intestinal obstruction. The appendix is normal.  Previously seen findings of duodenitis have nearly resolved. Pelvis: There is a Smith catheter in the bladder. Some air in the bladder was likely introduced through the catheter. Peritoneum/Retroperitoneum: There are aortoiliac atherosclerotic calcification. There is no abdominal aortic aneurysm. There is an IVC filter. There is no free air. Bones/Soft Tissues: There is a large periphery calcified cystic structure in the anterior abdominal wall which has not changed significantly. Unchanged peripherally calcified cystic mass in the abdominal wall is likely a chronic seroma. Infiltrates at visualized lung bases. Fatty liver. Coronary artery and aortic calcified plaque. Near resolution of previously seen findings of duodenitis. Ct Abdomen Pelvis W Iv Contrast Additional Contrast? None    Result Date: 11/4/2020  EXAMINATION: CT OF THE ABDOMEN AND PELVIS WITH CONTRAST 11/4/2020 12:32 pm TECHNIQUE: CT of the abdomen and pelvis was performed with the administration of intravenous contrast. Multiplanar reformatted images are provided for review. Dose modulation, iterative reconstruction, and/or weight based adjustment of the mA/kV was utilized to reduce the radiation dose to as low as reasonably achievable. COMPARISON: September 10, 2020 HISTORY: ORDERING SYSTEM PROVIDED HISTORY: abdominal wound TECHNOLOGIST PROVIDED HISTORY: Reason for exam:->abdominal wound Additional Contrast?->None FINDINGS: There is redemonstration of a circumscribed, hypoattenuating mass associated with the ventral abdominal subcutaneous fat which measures 8 x 15 cm in AP and axial dimension. This mass is similar in size compared to previous examination with partial calcification of its margins. Postsurgical changes are also associated with ventral abdominal wall related to hernia repair. There is no bowel obstruction, free air, or free fluid.   There is thickened wall of the duodenum with surrounding inflammatory changes. There is generalized decreased attenuation throughout the liver. No intrahepatic or extrahepatic bile duct dilatation. There is evidence of cholecystectomy. Pancreas is grossly unremarkable. Spleen is nonenlarged. Adrenal glands are unremarkable. There is no hydronephrosis. There is bilateral perinephric fat stranding Claudia similar since prior. There is a stable cyst associated with the right kidney which measures up to 3.1 cm and is similar in size compared to the previous examination. Severe calcified and noncalcified atherosclerotic plaques associated with the abdominal aorta. There is no pneumonia or pleural effusion in the lung bases. 1.  Stable hypoattenuating mass associated with ventral abdominal wall fat could suggest chronic seroma. Clinical correlation recommended for possibility of infection. 2.  New thickened wall of duodenum with adjacent inflammatory changes suggest infectious or inflammatory duodenitis. 3.  No bowel obstruction, free air, or free fluid. Xr Chest Portable    Result Date: 11/24/2020  EXAMINATION: ONE XRAY VIEW OF THE CHEST 11/24/2020 2:21 am COMPARISON: None. HISTORY: ORDERING SYSTEM PROVIDED HISTORY: covid weak TECHNOLOGIST PROVIDED HISTORY: Reason for exam:->covid weak What reading provider will be dictating this exam?->CRC FINDINGS: A right-sided PICC is present with the distal tip in the region of the right atrium. The lungs are without acute focal process. There is no effusion or pneumothorax. The cardiomediastinal silhouette is without acute process. The osseous structures are without acute process. No acute process. Xr Chest Portable    Addendum Date: 11/10/2020    ADDENDUM: There is a right-sided PICC line. Tip of the catheter is in the superior vena cava.      Result Date: 11/10/2020  EXAMINATION: ONE XRAY VIEW OF THE CHEST 11/10/2020 2:25 pm COMPARISON: 11/04/2020 HISTORY: ORDERING SYSTEM PROVIDED HISTORY: RIGHT SIDED PICC PLACEMENT TECHNOLOGIST PROVIDED HISTORY: Reason for exam:->RIGHT SIDED PICC PLACEMENT FINDINGS: Heart size is normal.  There are no infiltrates or effusions. Normal chest     Xr Chest Portable    Result Date: 11/4/2020  EXAMINATION: ONE XRAY VIEW OF THE CHEST 11/4/2020 12:11 pm COMPARISON: 09/10/2020 HISTORY: ORDERING SYSTEM PROVIDED HISTORY: pneumonia TECHNOLOGIST PROVIDED HISTORY: Reason for exam:->pneumonia FINDINGS: The lungs are without acute focal process. There is no effusion or pneumothorax. The cardiomediastinal silhouette is without acute process. The osseous structures are without acute process. No acute process. CXR 11/24/2020      CT Abdomen/Pelvis       EKG: Sinus tachycardia, rate 121bpm, normal axis, no acute ST elevations or depressions. QTc 445    Resident's Assessment and Plan     Elana Hauser is a 69 y.o. 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 Neurology:   1. Acute encephalopathy - possibly 2/2 sepsis vs hypovolemic shock in setting of unclear baseline mental status   · Continue to monitor neurological status   · Consider contacting SNF and pt's family to elicit baseline mental status   · Continue Zoloft PO daily     Cardiology:   1.  Shock - likely hypovolemic 2/2  GI bleed vs septic in setting of prior MSSA bacteremia   · S/p 1U PRBCs and 2L IVF   · F/u H/H q8hrs   · Daily CBC, CMP, Mg, Phos, D-Dimer, and Fibrinogen,   · APTT and PT/INR q6 hrs   · Continue Protonix IV BID   · Gen Surgery consulted - no EGD until COVID negative and adequately resuscitated     · Continue to monitor pt vitals   · MAP goal >65 mmHg   · F/u NICOM - will fluid resuscitate if necessary   · Will transfuse to maintain Hgb >7.0 2. HTN   · Continue to monitor pt vitals   · Hold antihypertensives at this point d/t #1 above     Pulmonary:   1. COVID pneumonia   · Pt intermittently requiring low levels of supplemental O2 - not significantly hypoxic   · Will hold on Decadron and Remdesivir given pt's lack of hypoxia   · Continue supplemental O2 - will wean as tolerated   · Continue droplet plus isolation  · Continue Brovana and Pulmicort BID   · Continue Albuterol q4hrs PRN  · F/u repeat COVID test   · Continue to monitor pt vitals     Nephrology (Fluids/ Electrolytes & Nutrition):   1. LORETA - likely pre-renal 2/2 intravascular volume depletion in setting of upper GI bleed and Hx of septicemia   · Baseline Cr 0.5-0.6  · Avoid nephrotoxic meds   · Continue daily CBC, CMP, Mg, Phos - will replete electrolytes as necessary   · F/u urine electrolytes - will calculate FeUrea   · Continue to trend Cr    2. HAGMA - likely 2/2 lactic acidosis in setting of suspected upper GI bleed and Hx of MSSA septicemia   · Pt s/p 2 L   · F/u NICOM - will continue fluids if necessary  · F/u repeat lactic acid   · Continue to monitor     Gastroenterology:   1. Suspected upper GI bleed  · CT abdomen/pelvis showing healing duodenitis   · Pt hemodynamically stable at this time   · General Surgery consulted - no EGD at this time until stabilized and COVID (-)   · F/u repeat COVID test  · Will place NG/OG tube   · Continue Protonix BID   · Continue Zofran PRN   · H/H q8 hrs    · Continue to trend Hgb - will transfuse to maintain Hb >7.0   · Will re-consult when COVID (-) to pursue EGD     Hematology/ Oncology:   1.  Elevated INR in pt not on chronic anticoagulation   · Vit K given by General Surgery team   · Will transfuse FFP and re-check INR   · F/u liver US given hepatic steatosis noted on CT Abdomen/pelvis   · F/u results of mixing study   · Continue to monitor for signs of increased bleeding   · Daily D-dimer and fibrinogen   · APTT and PT/INR q6 hrs   · Remainder of plan as above     Infectious Disease:   1. Leukocytosis in setting of Hx of MSSA bacteremia - possibly some reactive component as well  · Continue Ancef while inpatient   · F/u blood and urine cx   · F/u MRSA screening   · Continue to monitor daily CBC - will trend WBCs     Endocrine:   1. IDDM   · Last Hgb A1c 9.4   · Pt currently NPO   · Continue Gabapentin  · Continue Humalog Mix 25U BID w/ meals   · Continue HDSS   · POCT BG q4 hrs   · Hypoglycemia protocol in place     Code Status:   FULL      DVT ppx: N/A   GI ppx: Protonix   Disposition: continue current management     Tony Pham DO, PGY-1   Attending physician: Dr. Ambrocio Rose  Department of Pulmonary, Critical Care and [facility]  Department of Internal Medicine      During multidisciplinary team rounds Silvio Ramey is a 69 y.o. female was seen, examined and discussed. This is confirmation that I have personally seen and examined the patient and that the key elements of the encounter were performed by me (> 85 % time). The medications & laboratory data was discussed and adjusted where necessary. The radiographic images were reviewed or with radiologist or consultant if felt dis-concordant with the exam or history. The above findings were corroborated, plans confirmed and changes made if needed. Family is updated at the bedside as available. Key issues of the case were discussed among consultants. Critical Care time is documented if appropriate.       Rafal Bowles DO, FACP, FCCP, [facility],

## 2020-11-24 NOTE — ED NOTES
Pt cleaned of small amount of blood and clots from rectum.   Rectal area red and excoriated      Jeffrey Bustos RN  11/24/20 1702

## 2020-11-24 NOTE — ED NOTES
Pt resting with eyes closed awakens easily alert oriented skin warm dry resp easy abd soft non tender bowel sounds present pt denies pain     Anthony Townsend RN  11/24/20 [id number]

## 2020-11-24 NOTE — CARE COORDINATION
Verified on 11/24/20 that patient has a non-qualifying BPCI-A DRG of [phone] for hospital encounter admission date 11/4/20 at [facility].

## 2020-11-24 NOTE — ED NOTES
Pt A&O x 4. Pt agrees to blood transfusion.   Permit signed for blood     Narendra Brown RN  11/24/20 5715

## 2020-11-24 NOTE — ED NOTES
Smith inserted for 700 cc residual     Thomas Kunz, [address]  11/24/20 7520

## 2020-11-24 NOTE — ED NOTES
Radiology Procedure Waiver   Name: Myrna Pond  : 1950  MRN: [id number]    Date:  20    Time: 3:27 AM EST    Benefits of immediately proceeding with Radiology exam(s) without pre-testing outweigh the risks or are not indicated as specified below and therefore the following is/are being waived:    [] Pregnancy test   [] Patients LMP on-time and regular.   [] Patient had Tubal Ligation or has other Contraception Device. [] Patient  is Menopausal or Premenarcheal.    [] Patient had Full or Partial Hysterectomy. [] Protocol for Iodine allergy    [] MRI Questionnaire     [x] BUN/Creatinine   [] Patient age w/no hx of renal dysfunction. [] Patient on Dialysis. [] Recent Normal Labs.   Electronically signed by Leana nKight MD on 20 at 3:27 AM EST               Leana Knight MD  20 [id number]

## 2020-11-24 NOTE — H&P
[address]                  [address] [facility],  [facility]                              HISTORY AND PHYSICAL    PATIENT NAME: Klaudia Malave                      :        1950  MED REC NO:   [id number]                            ROOM:       4415  ACCOUNT NO:   [de-identified]                           ADMIT DATE: 2020  PROVIDER:     Ricarda Weeks DO    CHIEF COMPLAINT:  Coffee-ground emesis with black tarry stool. HISTORY OF CHIEF COMPLAINT:  A 76-year-old female brought into the ED  from the [facility] due to a 1-day history of noted black  tarry stools and coffee-ground emesis per nursing home staff. The  patient is currently receiving IV antibiotics at the outpatient facility  due to bacteremia secondary to a chronic seroma. The patient had no  abdominal pain but did have a recent history of the coffee-ground  emesis. She is awake and alert and oriented, reports no chest pain or  shortness of breath. The patient does have a history of COVID positive  on 2020. She does have a history of pulmonary embolism as well as  uncontrolled type 2 diabetes, DVT, history of colon cancer, asthma,  arthritis, hypertension. Workup in the ER showed that her H and H was  8.2 and 27.2. She was afebrile. Her white count was 14,000. Electrolytes showed BUN and creatinine of 27 and 1.1. Her CO2 was 15. Sodium 140, potassium 3.5. Her lipase was 22. Her lactic acid was  10.6. INR was 2.4. Urinalysis shows small leukocytes. Troponin less  than 0.01. Microscopic on urinalysis shows greater than 20 WBCs, few  bacteria, one to three RBCs. CT scan of abdomen and pelvis with  contrast shows unchanged peripherally calcified cystic mass in abdominal  wall which is likely a chronic seroma. Infiltrates visualized at the  lung bases, fatty liver noted, coronary artery and aortic calcified  plaques noted.   Chest x-ray showed no acute process. EKG showed sinus  tach at rate of 121. No acute ST or T-wave abnormalities noted. The  patient did receive IV Protonix and bolused with fluids due to a bout of  hypotension while in the emergency room; systolically her blood pressure  dropped in the 80s. Surgery was consulted due to heme-positive stools,   _____ and the patient will be admitted under my service to Medical  Intensive due to her active hemorrhaging and hypotension. She offers no  other complaints at this time. PAST MEDICAL HISTORY:  1. History of pulmonary embolism. 2.  History of DVT. 3.  Hypertension. 4.  Uncontrolled type 2 diabetes. 5.  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 6.  Asthma. 7.  Diffuse arthritis. 8.  History of colon CA in 2006. PAST SURGICAL HISTORY:  1. Oophorectomy bilaterally. 2.  PICC line insertion in 11/2020.  3.  Partial resection colectomy. 4.  Cholecystectomy. 5.  Abdominal hernia repair in 08/2013 at age 58. ALLERGIES:  She has an allergy to FLUTICASONE, SALMETEROL. MEDICATIONS PRIOR TO ADMISSION:  She was on Singulair 10 mg one daily,  Symbicort 160/4.5 two puffs twice a day, aspirin 81 mg daily, albuterol  inhaler 2 puffs every 6 hours as needed, Zoloft 100 mg daily, Lopressor  25 mg one by mouth twice a day, Ancef she was getting 2000 mg IV q.8h,  NovoLog 70/30 mix 80 units subcu twice a day, Zofran 4 mg one by mouth  every 6 hours as needed for nausea and vomiting, Tylenol No. 3 one  tablet twice a day, glyburide 5 mg one by mouth twice a day, Glucophage  1000 mg one by mouth twice a day, Lasix 20 mg daily, Neurontin 300 mg  one by mouth three times a day, Zaroxolyn 2.5 mg one by mouth every  other day. SOCIAL HISTORY:  Nondrinker, nonsmoker. No history of illicit drug use. The patient does not vape. Occupation is retired. She is . FAMILY HISTORY:  Unknown. REVIEW OF SYSTEMS:  As mentioned in chief complaint, otherwise  unremarkable.     PHYSICAL EXAMINATION:  VITAL SIGNS:  On admission, temperature 96.8, pulse 120, respirations  35, blood pressure 90/57, pulse ox 98% on room air. Height 5 feet 5  inches, weight 191 pounds, BMI is 31.78. GENERAL:  Alert and oriented  x3, appears to be in no acute distress. SKIN:  Adequate turgor. No tenting, no rash or wounds. HEENT:  Unremarkable. HEART:  Regular rate and rhythm. Slightly tachycardic without murmurs,  rubs or gallops. LUNGS:  Decreased breath sounds in the bilateral  bases. ABDOMEN:  Slightly obese, soft, nontender, nondistended. Good bowel  sounds throughout. No masses, no organomegaly, no bruit. EXTREMITIES:  No clubbing, cyanosis, or edema noted. NEUROLOGIC:  Intact. No focal deficits. Cranial nerves II-XII grossly  intact. MUSCULOSKELETAL:  Appropriate for above-stated age. LABORATORY DATA:  Metabolic panel:  Sodium 498, potassium 3.5, chloride  100, CO2 15, BUN and creatinine 27 and 1.1. Lactic acid was 10.6,  glucose 278, calcium 8.3, total protein 6.1. Troponin less than 0.01. CBC:  White count 14, platelets 012, H and H 8.2 and 27.2. INR is 2.4. Urinalysis shows trace ketones, small leukocytes, negative nitrites,  greater than 20 WBCs, small leukocytes, few bacteria. CT scan of  abdomen and pelvis as noted in chief complaint as well as chest x-ray. IMPRESSION:  1.  GI bleed, upper with coffee-ground emesis. 2.  COVID-19 positive. 3.  Lactic acidosis. 4.  Anemia. 5.  Leukocytosis, likely reactive. 6.  Type 2 diabetes, uncontrolled. 7.  History of pulmonary embolism. 8.  History of colon CA. 9.  History of DVT. 10.  Hypertension. 11.  Asthma. 12.  Diffuse arthritis. 13.  History of chronic seroma in abdominal wall. 14.  Recent history of bacteremia on IV Ancef. PLAN:  The patient to be admitted under my service to Medical Intensive. We will have Pulmonary and intensivist team consulted as well as ID for  IV antibiotics.   Surgery has already seen the patient in the ER. She  will be placed on IV Protonix twice a day and IV fluids. We will  monitor labs and treat accordingly.     DISPOSITION:  Back to [facility]/skilled nursing facility pending clinical  course here at [address] [place] [address],     D: 11/24/2020 8:25:19       T: 11/24/2020 8:30:20     [email or web address]  Job#: [id number]     Doc#: [id number]    CC:

## 2020-11-24 NOTE — PROGRESS NOTES
Patient  Transported from er to 65. Alert and oriented x 4. Right arm pic line white port with rbc infusing. [place] port not able to be flushed with saline. Patient moves all extremities to command. Strength equal all extremities. Patient has random scratches multiple areas of body. No wounds or issues with skin. Skin warm dry. Patient cleaned for large amount bloody stool with clots upon arrival.  Will continue to monitor H/H. Labs to be drawn for repeat lactic acid.

## 2020-11-24 NOTE — ED NOTES
Spoke with Alix in [facility] regarding pt being ready for CT. Informed Alix GALLAGHER needed to bring pt over and per CT they will call when ready.   Informed CT that Dr Laxmi Dickson would like pt to come soon for scan     Doyle Galvin RN  11/24/20 7480

## 2020-11-24 NOTE — ED NOTES
Pt arrived with large amounts of dark blood with clots from rectum. Per facility pt had coffee ground emesis.   Pt c/o abd pain, intermittent with palpation     Bre Snyder RN  11/24/20 0156

## 2020-11-24 NOTE — CONSULTS
GENERAL SURGERY  CONSULT NOTE  11/24/2020    Physician Consulted: Dr. Dina Aguilar  Reason for Consult: GI Bleed  Referring Physician: Dr. Sd BELLAMY  Mini Manning is a 69 y.o. female COVID+ 11/17 with history of colon cancer s/p ex-lap right hemicolectomy and incisional hernia repair with mesh 2013 with seroma formation s/p aspiration 11/6 who presents for evaluation of GI bleed. She was sent here from her nursing facility and is a poor historian. Most of the information is taken from chart review. She was recently admitted at [facility] for bacteremia where the seroma was aspirated but negative for gram stain and cultures. Today, she was sent from her nursing facility due to coffee-ground emesis and black tarry stools that began late last night. She does not remember whether she vomited or not and does not know if she has been passing blood per rectum. Per the nurse, she has not had any emesis since coming to the ED, but she did have a large bowel movement with blood clots and describes it as black and tarry. She denies any abdominal pain, GERD, overuse of NSAIDs, smoking, alcohol abuse, fever, or chills. She has never had an EGD and her last colonoscopy was September 2019 which revealed polyps. Her only other abdominal surgery includes a cholecystectomy. She reports taking anticoagulation medication but is unsure of which one. According to the chart, she is on ASA 81mg daily.       Past Medical History:   Diagnosis Date    Arthritis     Asthma     no recent attacks past 18 months    Colon cancer ([address] 2006    colon CA    Diabetes mellitus ([address]     DVT (deep venous thrombosis) [facility] 2006    Hernia of abdominal cavity     Hypertension     pt states \"pretty good\"; on no meds    Other disorders of kidney and ureter     kidney stone    PE (pulmonary embolism) 2006       Past Surgical History:   Procedure Laterality Date    ABDOMINAL HERNIA REPAIR  8/5/13    CHOLECYSTECTOMY      COLON SURGERY partial resection    INSERT PICC LINE  11/10/2020         OVARY REMOVAL      bilateral       Medications Prior to Admission:    Prior to Admission medications    Medication Sig Start Date End Date Taking? Authorizing Provider   montelukast (SINGULAIR) 10 MG tablet Take 1 tablet by mouth nightly 11/10/20   Ekaterina Joshi DO   budesonide-formoterol (SYMBICORT) 160-4.5 MCG/ACT AERO Inhale 2 puffs into the lungs 2 times daily Hasn't used in 3 months 11/10/20   Ekaterina Joshi DO   aspirin 81 MG EC tablet Take 1 tablet by mouth daily Last dose taken several months ago 11/10/20   Ekaterina Joshi DO   albuterol sulfate  (90 Base) MCG/ACT inhaler Inhale 2 puffs into the lungs every 6 hours as needed for Shortness of Breath 11/10/20   Galileo Joshi DO   sertraline (ZOLOFT) 25 MG tablet Take 4 tablets by mouth daily Instructed to take morning of surgery with a sip of water 11/10/20   Ekaterina Joshi DO   metoprolol tartrate (LOPRESSOR) 25 MG tablet Take 1 tablet by mouth 2 times daily 11/10/20   Galileo Joshi DO   nystatin (MYCOSTATIN) 407974 UNIT/GM ointment Apply topically 2 times daily. 11/10/20   Galileo Joshi DO   ceFAZolin (ANCEF) infusion Infuse 2,000 mg intravenously every 8 hours for 15 days Compound per protocol 11/10/20 11/25/20  Niurka Rice MD   Insulin Aspart Prot & Aspart (NOVOLOG MIX 70/30 SC) Inject 80 Units into the skin 2 times daily (before meals) Follows sliding scale    Historical Provider, MD   ondansetron (ZOFRAN) 4 MG tablet Take 4 mg by mouth every 6 hours as needed for Nausea or Vomiting    Historical Provider, MD   ACETAMINOPHEN-CODEINE #3 PO Take 1 tablet by mouth 2 times daily    Historical Provider, MD   glyBURIDE (DIABETA) 5 MG tablet Take 2 tablets by mouth 2 times daily (with meals). 8/11/13   Marie Hernandez DO   metformin (GLUCOPHAGE) 1000 MG tablet Take 1 tablet by mouth 2 times daily (with meals).  8/11/13   Marie Hernandez DO NONFORMULARY Takes OTC antihistamine prn    Historical Provider, MD   furosemide (LASIX) 20 MG tablet Take 20 mg by mouth daily. Historical Provider, MD   gabapentin (NEURONTIN) 300 MG capsule Take 800 mg by mouth 3 times daily. Historical Provider, MD   metolazone (ZAROXOLYN) 2.5 MG tablet Take 2.5 mg by mouth every other day. Takes on M-W-F    Historical Provider, MD       Allergies   Allergen Reactions    Fluticasone-Salmeterol      Causes eye blindness in patient       History reviewed. No pertinent family history. Social History     Tobacco Use    Smoking status: Never Smoker    Smokeless tobacco: Never Used   Substance Use Topics    Alcohol use: No    Drug use: No         Review of Systems   General ROS: negative  Hematological and Lymphatic ROS: negative  Respiratory ROS: negative  Cardiovascular ROS: negative  Gastrointestinal ROS: As above  Genito-Urinary ROS: negative  Musculoskeletal ROS: negative      PHYSICAL EXAM:    Vitals:    11/24/20 0317   BP: (!) 88/53   Pulse: 113   Resp: 25   Temp:    SpO2: 100%       General Appearance:  awake, alert, oriented, in no acute distress  Skin:  Skin color, texture, turgor normal. No rashes or lesions. Head/face:  NCAT  Eyes:  No gross abnormalities. Lungs: On 2L NC  Heart:  Tachycardic and normotensive  Abdomen:  Soft, diffuse TTP but unreliable and distractable, non-distended. Chronic wound on mid-abdomen  Extremities: Extremities warm to touch  Female Rectal: Rectal exam refused by patient. Per ED, dark black stool on glove,  FOBT+    LABS:    CBC  Recent Labs     11/24/20 0207   WBC 14.0*   HGB 8.2*   HCT 27.2*   *     BMP  Recent Labs     11/24/20 0207      K 3.5      CO2 15*   BUN 27*   CREATININE 1.1*   CALCIUM 8.3*     Liver Function  Recent Labs     11/24/20 0207   LIPASE 22   BILITOT 0.4   AST 23   ALT <5   ALKPHOS 71   PROT 6.1*   LABALBU 2.4*     No results for input(s): LACTATE in the last 72 hours.   Recent Labs     11/24/20  0207   INR 2.4       RADIOLOGY    Xr Chest Portable    Result Date: 11/24/2020  EXAMINATION: ONE XRAY VIEW OF THE CHEST 11/24/2020 2:21 am COMPARISON: None. HISTORY: ORDERING SYSTEM PROVIDED HISTORY: covid weak TECHNOLOGIST PROVIDED HISTORY: Reason for exam:->covid weak What reading provider will be dictating this exam?->CRC FINDINGS: A right-sided PICC is present with the distal tip in the region of the right atrium. The lungs are without acute focal process. There is no effusion or pneumothorax. The cardiomediastinal silhouette is without acute process. The osseous structures are without acute process. No acute process.       ASSESSMENT:  69 y.o. female COVID+ with melena, lactic acidosis, anemia, and FOBT+, likely upper GI bleed    PLAN:  - Admit to medicine  - NPO  - Ensure adequate resuscitation with IVFs  - F/U CT abd/pelvis  - PPI BID  - Pt is COVID+ so will wait to perform EGD until adequately resuscitated and COVID negative  - Monitor H/H, transfuse per primary  - If patient becomes unstable or is need of emergent EGD, then will reassess and discuss performing EGD sooner  - Pain control PRN  - Antiemetic PRN  - Discussed with Dr. Juwan Ramos    Electronically signed by Jem Haq MD on 11/24/20 at 3:17 AM EST    Seen/examined  Empiric treatment for ulcer disease  Monitor Hb  Hold off on EGD for [facility]

## 2020-11-24 NOTE — PROGRESS NOTES
Occupational Therapy  Occupational Therapy Initial Evaluation   Date:2020  Patient Name: Alina Marks  MRN: [id number]  : 1950  Room: [facility]    Referring Provider: Liliya Patel MD     Evaluating OT: Haritha Lewis OTR/L #[id number]    AM-PAC Daily Activity Raw Score:     Recommended Adaptive Equipment: TBD     Diagnosis: GI bleed [K92.2]    Pertinent Medical History: Arthritis, asthma, colon CA, DM, DVT, hernia of abdominal cavity, HTN, kidney stone, PE,    Precautions:  Falls, droplet plus precautions, covid+, O2     Home Living: Pt lives with  in a 1 story house with 2 step(s) to enter and 1 rail(s); bed/bath on 1st floor  Bathroom setup: tub/shower w/ shower chair; standard commode  Equipment owned: shower chair, ww    Prior Level of Function: Assistance with ADLs; Assistance with IADLs. ww for ambulation. Driving: Yes \" when I want too\"    Pain Level: pt c/o 0/10 pain this session    Cognition: A&O: 4/4  Follows 1 step commands. Perseverates on wanting water. She is NPO for initial evaluation. Able to be redirected for a minimal amount of time. Memory: fair   Comprehension fair   Problem solving: fair   Judgement/safety: fair               Communication skills:            Vision: wfl               Glasses: readers                                                   Hearing: wfl     RASS: +1  CAM-ICU: (-) Delirium    UE Assessment:  Hand Dominance: Right []  Left [x]     ROM Strength STM goal: PRN   RUE  WFL 4-/5 4+/5     LUE WFL 4-/5 4+/5       Sensation: No c/o numbness or tingling in extremities   Tone: WNL   Edema: none noted     Functional Assessment   Initial Eval Status  Date: 20 Treatment Status  Date: STG=LTG  5-14  days    Feeding NPO (SBA simulated)                        Mod. I  while seated up in chair to increase activity tolerance        Grooming Min.  A to wash face/hands seated EOB                        SBA   while standing sink level requiring ww for balance and demonstrating good tolerance      UB dressing Mod. A                        SBA       LB dressing Dep  Min. A   using AE as needed for safe reach/ energy conservation     Bathing Max A (simulated)                        Min. A   using AE as needed for safe reach/ energy conservation       Toileting Max A (pt incontinent of bowels initially; assisted w/ hygiene/clothing management)                        Min. A     Bed Mobility  Rolling: Min. A    Supine to sit: Mod A    Sit to supine: Mod. A                     SBA  in prep of ADL tasks & transfers   Functional Transfers Sit to stand: Max A    Stand to sit: Max A    3x for endurance                        SBA  sit<>stand/functional bathroom transfers using AD/DME as needed for balance and safety   Functional Mobility Max A w/ HHA (few side steps towards HOB in preparation for functional mobility)                       Min. A   functional/bathroom mobility using AD as needed & demonstrating good safety     Balance Sitting:     Static:  SBA    Dynamic:Min. A  Standing: Max A  independent dynamic sitting balance; min. A dynamic standing balance  during ADL tasks & transfers   Endurance/Activity Tolerance   Fair- tolerance with light activity. Pt sat EOB >20 minutes for ADLs. endurance, upright posture  good  tolerance with moderate activity/self care routine   Visual/  Perceptual   WFL                       Vitals:   HR at rest: 75 bpm HR at end of session: 87 bpm   Spo2 at rest:97% Spo2 at end of session 99%   BP at rest:107/74 mmHg BP at end of session 136/58 mmHg       Treatment:   · Bed mobility: Facilitated bed mobility with cues for proper body mechanics and sequencing to prepare for ADL completion. · Functional transfers: Facilitated 3x transfers from EOB with cues for body alignment, safety and hand placement.   · ADL completion: Self-care retraining for the above-mentioned ADLs; training on proper hand placement, safety technique, sequencing, and psychosocial history related to current functional performance. · Exam: 5+ performance deficits identified limiting functional independence and safe return home   · Assistance/Modification: Min/mod assistance or modifications required to perform tasks. May have comorbidities that affect occupational performance. Assessment of current deficits   Functional mobility [x]  ADLs [x] Strength [x]  Cognition []  Functional transfers  [x] IADLs [x] Safety Awareness [x]  Endurance [x]  Fine Motor Coordination [] Balance [x] Vision/perception [] Sensation []   Gross Motor Coordination [] ROM [] Delirium []                  Communication []    Plan of Care: 1-3 days/week for 1-2 weeks PRN   [x]ADL retraining/adapted techniques and AE recommendations to increase functional independence within precautions                    [x]Energy conservation techniques to improve tolerance for selfcare routine   [x]Functional transfer/mobility training/DME recommendations for increased independence, safety and fall prevention         [x]Patient/family education to increase safety and functional independence             [x]Environmental modifications for safe mobility and completion of ADLs                             []Cognitive retraining ex's to improve problem solving skills & safe participation in ADLs/IADLs     []Sensory re-education techniques to improve extremity awareness, maintain skin integrity and improve hand function                             []Visual/Perceptual retraining  to improve body awareness and safety during transfers and ADLs  []Splinting/positioning needs to maintain joint/skin integrity and prevent contractures  [x]Therapeutic activity to improve functional performance during ADLs.                                          [x]Therapeutic exercise to improve tolerance and functional strength for ADLs   [x]Balance retraining/tolerance tasks for facilitation of postural control with dynamic challenges during ADLs .  []Neuromuscular re-education: facilitation of righting/equilibrium reactions, midline orientation, scapular stability/mobility, Normalization muscle     tone and facilitation active functional movement/Attention                         [x]Delirium prevention/treatment    []Positioning to improve functional independence  []Other:       Rehab Potential: Good for established goals     Patient / Family Goal: not stated      Patient and/or family were instructed on functional diagnosis, prognosis/goals and OT plan of care. Demonstrated fair understanding. Time In: 1:45  Time Out: 2:30  Total Treatment Time: 38 minutes    Min Units   OT Eval Low 20966       OT Eval Medium 97166 x 1   OT Eval High 56463       OT Re-Eval [id number]       Therapeutic Ex 67394       Therapeutic Activities 66659  15  1   ADL/Self Care 00018  23  2   Orthotic Management 75100       Neuro Re-Ed 18756       Non-Billable Time          Evaluation Time includes thorough review of current medical information, gathering information on past medical history/social history and prior level of function, completion of standardized testing/informal observation of tasks, assessment of data and education on plan of care and goals.     Declan Weems OTR/L #[id number]

## 2020-11-25 PROBLEM — R57.1 HYPOVOLEMIC SHOCK (HCC): Status: ACTIVE | Noted: 2020-01-01

## 2020-11-25 PROBLEM — U07.1 COVID-19: Status: ACTIVE | Noted: 2020-01-01

## 2020-11-25 NOTE — PLAN OF CARE
Problem: Falls - Risk of:  Goal: Will remain free from falls  Description: Will remain free from falls  11/25/2020 1823 by Manjula Purcell RN  Outcome: Met This Shift     Problem: Falls - Risk of:  Goal: Absence of physical injury  Description: Absence of physical injury  11/25/2020 1823 by Manjula Purcell RN  Outcome: Met This Shift     Problem: Airway Clearance - Ineffective  Goal: Achieve or maintain patent airway  11/25/2020 1823 by Manjula Purcell RN  Outcome: Met This Shift     Problem: Gas Exchange - Impaired  Goal: Absence of hypoxia  11/25/2020 1823 by Manjula Purcell RN  Outcome: Met This Shift     Problem: Breathing Pattern - Ineffective  Goal: Ability to achieve and maintain a regular respiratory rate  11/25/2020 1823 by Manjula Purcell RN  Outcome: Met This Shift     Problem: Skin Integrity:  Goal: Absence of new skin breakdown  Description: Absence of new skin breakdown  Outcome: Ongoing

## 2020-11-25 NOTE — PLAN OF CARE
Problem: Falls - Risk of:  Goal: Will remain free from falls  Description: Will remain free from falls  Outcome: Met This Shift  Goal: Absence of physical injury  Description: Absence of physical injury  Outcome: Met This Shift     Problem: Airway Clearance - Ineffective  Goal: Achieve or maintain patent airway  Outcome: Met This Shift     Problem: Gas Exchange - Impaired  Goal: Absence of hypoxia  Outcome: Met This Shift     Problem: Breathing Pattern - Ineffective  Goal: Ability to achieve and maintain a regular respiratory rate  Outcome: Met This Shift     Problem:  Body Temperature -  Risk of, Imbalanced  Goal: Ability to maintain a body temperature within defined limits  Outcome: Met This Shift     Problem: Risk for Fluid Volume Deficit  Goal: Maintain normal heart rhythm  Outcome: Met This Shift     Problem: Loneliness or Risk for Loneliness  Goal: Demonstrate positive use of time alone when socialization is not possible  Outcome: Not Met This Shift

## 2020-11-25 NOTE — PROGRESS NOTES
General Progress Note  11/25/2020 7:17 AM  Subjective:   Admit Date: 11/24/2020  PCP: Lucy Joshi DO  Interval History: no acute issues overnight. Remains in MICU. H/H stable at this time. Diet: Diet NPO Effective Now  Pain is:None  Nausea:None  Bowel Movement/Flatus yes    Data:   Scheduled Meds:   potassium chloride  40 mEq Intravenous Q2H    magnesium sulfate  2 g Intravenous Once    oxymetazoline  2 spray Each Nostril Once    lidocaine   Topical Once    Arformoterol Tartrate  15 mcg Nebulization BID    budesonide  1 mg Nebulization BID    gabapentin  800 mg Oral TID    montelukast  10 mg Oral Nightly    sertraline  100 mg Oral Daily    sodium chloride flush  10 mL Intravenous 2 times per day    insulin lispro  0-18 Units Subcutaneous Q4H    ceFAZolin  2 g Intravenous Q8H    insulin lispro prot & lispro  25 Units Subcutaneous BID WC    pantoprazole  40 mg Intravenous BID    And    sodium chloride (PF)  10 mL Intravenous Daily    lidocaine PF  5 mL Intradermal Once    heparin flush  3 mL Intravenous 2 times per day     Continuous Infusions:   dextrose       PRN Meds:albuterol, sodium chloride flush, acetaminophen **OR** acetaminophen, promethazine **OR** ondansetron, glucose, dextrose, glucagon (rDNA), dextrose, sodium chloride flush, heparin flush  I/O last 3 completed shifts: In: 1124 [Blood:1074; IV Piggyback:50]  Out: 6346 [Urine:1395]  No intake/output data recorded.     Intake/Output Summary (Last 24 hours) at 11/25/2020 0717  Last data filed at 11/25/2020 0600  Gross per 24 hour   Intake 824 ml   Output 1395 ml   Net -571 ml       CBC with Differential:    Lab Results   Component Value Date    WBC 10.5 11/25/2020    RBC 2.99 11/25/2020    HGB 8.1 11/25/2020    HCT 26.1 11/25/2020     11/25/2020    MCV 87.3 11/25/2020    MCH 27.1 11/25/2020    MCHC 31.0 11/25/2020    RDW 16.3 11/25/2020    SEGSPCT 86 08/08/2013    LYMPHOPCT 18.5 11/25/2020    MONOPCT 5.5 11/25/2020 BASOPCT 0.3 11/25/2020    MONOSABS 0.58 11/25/2020    LYMPHSABS 1.94 11/25/2020    EOSABS 0.34 11/25/2020    BASOSABS 0.03 11/25/2020     CMP:    Lab Results   Component Value Date     11/25/2020    K 3.2 11/25/2020    K 3.2 11/24/2020     11/25/2020    CO2 29 11/25/2020    BUN 19 11/25/2020    CREATININE 0.7 11/25/2020    GFRAA >60 11/25/2020    LABGLOM >60 11/25/2020    GLUCOSE 98 11/25/2020    GLUCOSE 149 04/18/2012    PROT 5.7 11/25/2020    LABALBU 2.5 11/25/2020    LABALBU 4.0 04/18/2012    CALCIUM 8.1 11/25/2020    BILITOT 0.5 11/25/2020    ALKPHOS 62 11/25/2020    AST 23 11/25/2020    ALT <5 11/25/2020     Magnesium:    Lab Results   Component Value Date    MG 1.8 11/25/2020     Phosphorus:    Lab Results   Component Value Date    PHOS 2.4 11/25/2020     PT/INR:    Lab Results   Component Value Date    PROTIME 13.2 11/25/2020    INR 1.2 11/25/2020     Last 3 Troponin:    Lab Results   Component Value Date    TROPONINI <0.01 11/24/2020    TROPONINI <0.01 11/24/2020    TROPONINI <0.01 11/24/2020     U/A:    Lab Results   Component Value Date    COLORU Yellow 11/24/2020    PHUR 6.5 11/24/2020    WBCUA >20 11/24/2020    WBCUA NONE 12/31/2011    RBCUA 1-3 11/24/2020    RBCUA NONE 12/31/2011    BACTERIA FEW 11/24/2020    CLARITYU Clear 11/24/2020    SPECGRAV 1.015 11/24/2020    LEUKOCYTESUR SMALL 11/24/2020    UROBILINOGEN 0.2 11/24/2020    BILIRUBINUR Negative 11/24/2020    BILIRUBINUR NEGATIVE 12/31/2011    BLOODU Negative 11/24/2020    GLUCOSEU Negative 11/24/2020    GLUCOSEU 250 12/31/2011     HgBA1c:  No components found for: HGBA1C  TSH:    Lab Results   Component Value Date    TSH 1.380 06/24/2013     -----------------------------------------------------------------    Objective:   Vitals: BP (!) 137/55   Pulse 74   Temp 98.3 °F (36.8 °C) (Oral)   Resp 24   Ht 5' 5\" (1.651 m)   Wt 192 lb 9.6 oz (87.4 kg)   SpO2 93%   BMI 32.05 kg/m²   General appearance: alert, appears stated age and

## 2020-11-25 NOTE — PROGRESS NOTES
Examined patient from the door. Nursing staff reporting good bowel movement yesterday without blood. No nausea or vomiting. No abdominal pain. NG tube in place. Patient did require 1 unit of PRBC overnight. No hypotension while awake, intermittent hypotension while sleeping. We will continue to monitor hemoglobin and hematocrit.     Seen  Chart reviewed  Hb stabilized  Continue PPI   Should be able to remove NG and start PO  [id number]

## 2020-11-25 NOTE — CARE COORDINATION
Care Coordination: Mary Casiano to ED from [facility], was just discharged from [facility] 11/10. Here for GI Bleed. Transfused 1 unit PC, Surgery consult. Admit to MICU. Call placed to Esthela at [facility] left message. Also left message for . Pt is COVID,per nurse is confused. CM will await  to call back. Has CM Cell phone number. CM/SW will continue to follow for discharge planning. Ludwig Apgar [facility] [phone]     Care Coordination:Spoke with . He confirmed discharge plan is back to [facility]. Spoke with [facility] is not a bed hold but they will take back when ready for discharge. They will follow along. Envelope in soft chart. CM/SW will continue to follow for discharge planning.    Ludwig Apgar BSN,RN--[phone]

## 2020-11-25 NOTE — PROGRESS NOTES
[facility] PROGRESS NOTE      Chief complaint: Follow-up of COVID-19 and ongoing antibiotic treatment for MSSA bacteremia    The patient is a 69 y.o. female with history of DM, hypertension, DVT, colon cancer status post right hemicolectomy and incisional hernia repair with mesh in 9480 complicated by seroma formation status post aspiration on 11/06 during which no gross infection was noted, previously admitted from 11/0 3-12/81 for complicated MSSA bacteremia of unclear etiology (MAGALYS unable to be done due to patient's refusal) for which she was seen by Dr. Favian Walters and was given a 4-week course of cefazolin from 11/05-11/25, presented on 11/24 with tarry stools and coffee-ground emesis. On admission, she was afebrile, on 2 L/min supplemental oxygen, and hemodynamically stable with leukocytosis of 12,000. Urinalysis showed pyuria of more than 20 WBCs. Chest x-ray was unremarkable. CT abdomen and pelvis showed mild peripheral infiltrates in lower lungs, fatty infiltration of the liver status post cholecystectomy, IVC filter in place, large unchanged peripherally calcified cystic mass in the abdominal wall likely chronic seroma, near resolution of previously seen findings of duodenitis. Respiratory pathogen PCR panel was positive for SARS-CoV-2 PCR. She received a dose of ceftriaxone on admission. Cefazolin was resumed. Subjective: Patient was seen and examined. No chills, no abdominal pain, no diarrhea, no rash, no itching. She is on minimal supplemental oxygen. Objective:  BP (!) 123/54   Pulse 75   Temp 98.1 °F (36.7 °C) (Axillary)   Resp 24   Ht 5' 5\" (1.651 m)   Wt 192 lb 9.6 oz (87.4 kg)   SpO2 93%   BMI 32.05 kg/m²   Constitutional: Alert, not in distress  Respiratory: Clear breath sounds, no crackles, no wheezes  Cardiovascular: Regular rate and rhythm, no murmurs  Gastrointestinal: Bowel sounds present, soft, nontender.  Incision scar clean and dry with no signs of gross infection  Skin: Warm and dry, no active dermatoses  Musculoskeletal: No joint swelling, no joint erythema    Labs, imaging, and medical records/notes were personally reviewed. Assessment:  MSSA bacteremia of unclear etiology, antibiotic treatment ongoing  COVID-19, mild  GI bleeding    Recommendations:  Discontinue cefazolin after last dose today as previously planned. Follow up blood cultures. Remove PICC as soon as no longer indicated. Monitor respiratory status. Wean off oxygen as tolerated. Continue supportive care.     Thank you for involving me in the care of [facility]. I will continue to follow. Please do not hesitate to call for any questions or concerns.     Electronically signed by Carmelo Santos MD on 11/25/2020 at 10:31 AM

## 2020-11-25 NOTE — PLAN OF CARE
Problem: Falls - Risk of:  Goal: Will remain free from falls  Description: Will remain free from falls  11/25/2020 0532 by Deb Tsai RN  Outcome: Met This Shift  11/25/2020 0531 by Deb Tsai RN  Outcome: Met This Shift  Goal: Absence of physical injury  Description: Absence of physical injury  11/25/2020 0532 by Deb Tsai RN  Outcome: Met This Shift  11/25/2020 0531 by Deb Tsai RN  Outcome: Met This Shift     Problem: Airway Clearance - Ineffective  Goal: Achieve or maintain patent airway  11/25/2020 0532 by Deb Tsai RN  Outcome: Met This Shift  11/25/2020 0531 by Deb Tsai RN  Outcome: Met This Shift     Problem: Gas Exchange - Impaired  Goal: Absence of hypoxia  11/25/2020 0532 by Deb Tsai RN  Outcome: Met This Shift  11/25/2020 0531 by Deb Tsai RN  Outcome: Met This Shift     Problem: Breathing Pattern - Ineffective  Goal: Ability to achieve and maintain a regular respiratory rate  11/25/2020 0532 by Deb Tsai RN  Outcome: Met This Shift  11/25/2020 0531 by Deb Tsai RN  Outcome: Met This Shift     Problem:  Body Temperature -  Risk of, Imbalanced  Goal: Ability to maintain a body temperature within defined limits  11/25/2020 0532 by Deb Tsai RN  Outcome: Met This Shift  11/25/2020 0531 by Deb Tsai RN  Outcome: Met This Shift     Problem: Risk for Fluid Volume Deficit  Goal: Maintain normal heart rhythm  11/25/2020 0532 by Deb Tsai RN  Outcome: Met This Shift  11/25/2020 0531 by Deb Tsai RN  Outcome: Met This Shift     Problem: Loneliness or Risk for Loneliness  Goal: Demonstrate positive use of time alone when socialization is not possible  Outcome: Not Met This Shift

## 2020-11-25 NOTE — PROGRESS NOTES
[address] [facility]  Department of Internal Medicine   Internal Medicine Residency   MICU Progress Note    Patient:  Binh Lind 69 y.o. female  MRN: [id number]     Date of Service: 11/25/2020    Allergy: Fluticasone-salmeterol    Subjective     Overnight Events:     · Pt seen and examined at bedside this AM.   ·   Objective     VS: /85   Pulse 79   Temp 98.1 °F (36.7 °C) (Oral)   Resp 16   Ht 5' 5\" (1.651 m)   Wt 191 lb (86.6 kg)   SpO2 95%   BMI 31.78 kg/m²         I & O - 24hr:     Intake/Output Summary (Last 24 hours) at 11/25/2020 0326  Last data filed at 11/25/2020 0200  Gross per 24 hour   Intake 1124 ml   Output 1420 ml   Net -296 ml       Physical Exam: ***   Constitutional: Alert, AaO x2 (person and time). Uncooperative w/ questions. Follows commands. Appears uncomfortable. Head: Normocephalic and atraumatic. Eyes: (-) conjunctivitis (-) scleral icterus. Mucus membranes moist. No discharge   Mouth: Mucus membranes moist. Oropharynx clear. No deviation of the tongue or uvula. Neck: (-)  Swelling. Supple,   Respiratory: Diffuse crackles heard bilaterally, no wheeze, no increased work of breathing or respiratory distress. Cardiovascular: Tachycardic rate, regular rhythm. (-)  murmurs, (-) gallops,  (-) rubs. S1 and S2 were normal.   GI:  Diffuse TTP. Midline mass noted, compatible w/ seroma. Abdomen soft, (-) distention. (+) soft BS. (-) guarding, (-) rigidity.    Extremities: Warm and well perfused. (-) clubbing, (-) cyanosis. (-) peripheral edema.    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     Neurology:   1. Acute encephalopathy - possibly 2/2 sepsis vs hypovolemic shock in setting of unclear baseline mental status   · Continue to monitor neurological status   · Consider contacting SNF and pt's family to elicit baseline mental status   · Continue Zoloft PO daily      Cardiology:   1. Shock - likely hypovolemic 2/2  GI bleed vs septic in setting of prior MSSA bacteremia   · S/p 1U PRBCs and 2L IVF   · F/u H/H q8hrs   · Daily CBC, CMP, Mg, Phos, D-Dimer, and Fibrinogen,   · APTT and PT/INR q6 hrs   · Continue Protonix IV BID   · Gen Surgery consulted - no EGD until COVID negative and adequately resuscitated     · Continue to monitor pt vitals   · MAP goal >65 mmHg   · F/u NICOM - will fluid resuscitate if necessary   · Will transfuse to maintain Hgb >7.0      2. HTN   · Continue to monitor pt vitals   · Hold antihypertensives at this point d/t #1 above     Pulmonary:   1. COVID pneumonia   · Pt intermittently requiring low levels of supplemental O2 - not significantly hypoxic   · Will hold on Decadron and Remdesivir given pt's lack of hypoxia   · Continue supplemental O2 - will wean as tolerated   · Continue droplet plus isolation  · Continue Brovana and Pulmicort BID   · Continue Albuterol q4hrs PRN  · F/u repeat COVID test   · Continue to monitor pt vitals      Nephrology (Fluids/ Electrolytes & Nutrition):   1. LORETA - likely pre-renal 2/2 intravascular volume depletion in setting of upper GI bleed and Hx of septicemia   · Baseline Cr 0.5-0.6  · Avoid nephrotoxic meds   · Continue daily CBC, CMP, Mg, Phos - will replete electrolytes as necessary   · F/u urine electrolytes - will calculate FeUrea   · Continue to trend Cr     2.  HAGMA - likely 2/2 lactic

## 2020-11-25 NOTE — PROGRESS NOTES
[address] [facility]  Department of Internal Medicine   Internal Medicine Residency   MICU Progress Note    Patient:  Katie Sanchez 69 y.o. female  MRN: [id number]     Date of Service: 11/25/2020    Allergy: Fluticasone-salmeterol    Subjective     No complaints at this point in time. No fevers/chills. No emesis or diarrhea. No abdominal pain. Diet advanced to clears with Hb now stable s/p 1 unit pRBC. She is hemodynamically stable     24h change: NAEO  ROS: Denies Fever/chills/CP/SOB/N/V/D/C/Dysuria/Blood in stool or urine  Objective     VS: BP (!) 123/54   Pulse 75   Temp 98.1 °F (36.7 °C) (Axillary)   Resp 24   Ht 5' 5\" (1.651 m)   Wt 192 lb 9.6 oz (87.4 kg)   SpO2 93%   BMI 32.05 kg/m²           I & O - 24hr:     Intake/Output Summary (Last 24 hours) at 11/25/2020 1506  Last data filed at 11/25/2020 1400  Gross per 24 hour   Intake 619 ml   Output 1555 ml   Net -936 ml       Physical Exam:  Vitals: BP (!) 149/83   Pulse 79   Temp 98.1 °F (36.7 °C)   Resp 23   Ht 5' 5\" (1.651 m)   Wt 191 lb (86.6 kg)   SpO2 100%   BMI 31.78 kg/m²     Constitutional: Alert, AaO x2 (person and time). Follows commands. Appears uncomfortable. Head: Normocephalic and atraumatic. Eyes: (-) conjunctivitis (-) scleral icterus. Mucus membranes moist. No discharge   Mouth: Mucus membranes moist. Oropharynx clear. No deviation of the tongue or uvula. Neck: (-)  Swelling. Supple,   Respiratory: no wheeze, no increased work of breathing or respiratory distress. Cardiovascular: RRR; (-)  murmurs, (-) gallops,  (-) rubs. S1 and S2 were normal.   GI:  Abdomen soft, (-) distention. (+) soft BS. (-) guarding, (-) rigidity.    Extremities: Warm and well perfused. (-) clubbing, (-) cyanosis. (-) peripheral edema.    Neurologic: no focal neurological defecits       Lines     site day    Art line   None    TLC None    PICC R Arm 14   Hemoaccess None    Oxygen:     2L NC    ABG:   No results found for: PHART, PH, [id number], visualized spleen, pancreas, adrenal glands and kidneys demonstrate no significant abnormality. There is an unchanged right renal cyst. GI/Bowel: There are no findings of intestinal obstruction. The appendix is normal.  Previously seen findings of duodenitis have nearly resolved. Pelvis: There is a Smith catheter in the bladder. Some air in the bladder was likely introduced through the catheter. Peritoneum/Retroperitoneum: There are aortoiliac atherosclerotic calcification. There is no abdominal aortic aneurysm. There is an IVC filter. There is no free air. Bones/Soft Tissues: There is a large periphery calcified cystic structure in the anterior abdominal wall which has not changed significantly.     Unchanged peripherally calcified cystic mass in the abdominal wall is likely a chronic seroma. Infiltrates at visualized lung bases. Fatty liver. Coronary artery and aortic calcified plaque. Near resolution of previously seen findings of duodenitis.     Ct Abdomen Pelvis W Iv Contrast Additional Contrast? None     Result Date: 11/4/2020  EXAMINATION: CT OF THE ABDOMEN AND PELVIS WITH CONTRAST 11/4/2020 12:32 pm TECHNIQUE: CT of the abdomen and pelvis was performed with the administration of intravenous contrast. Multiplanar reformatted images are provided for review. Dose modulation, iterative reconstruction, and/or weight based adjustment of the mA/kV was utilized to reduce the radiation dose to as low as reasonably achievable. COMPARISON: September 10, 2020 HISTORY: ORDERING SYSTEM PROVIDED HISTORY: abdominal wound TECHNOLOGIST PROVIDED HISTORY: Reason for exam:->abdominal wound Additional Contrast?->None FINDINGS: There is redemonstration of a circumscribed, hypoattenuating mass associated with the ventral abdominal subcutaneous fat which measures 8 x 15 cm in AP and axial dimension. This mass is similar in size compared to previous examination with partial calcification of its margins.   Postsurgical changes are also associated with ventral abdominal wall related to hernia repair. There is no bowel obstruction, free air, or free fluid. There is thickened wall of the duodenum with surrounding inflammatory changes. There is generalized decreased attenuation throughout the liver. No intrahepatic or extrahepatic bile duct dilatation. There is evidence of cholecystectomy. Pancreas is grossly unremarkable. Spleen is nonenlarged. Adrenal glands are unremarkable. There is no hydronephrosis. There is bilateral perinephric fat stranding Claudia similar since prior. There is a stable cyst associated with the right kidney which measures up to 3.1 cm and is similar in size compared to the previous examination. Severe calcified and noncalcified atherosclerotic plaques associated with the abdominal aorta. There is no pneumonia or pleural effusion in the lung bases.      1.  Stable hypoattenuating mass associated with ventral abdominal wall fat could suggest chronic seroma. Clinical correlation recommended for possibility of infection. 2.  New thickened wall of duodenum with adjacent inflammatory changes suggest infectious or inflammatory duodenitis. 3.  No bowel obstruction, free air, or free fluid.      Xr Chest Portable     Result Date: 11/24/2020  EXAMINATION: ONE XRAY VIEW OF THE CHEST 11/24/2020 2:21 am COMPARISON: None. HISTORY: ORDERING SYSTEM PROVIDED HISTORY: covid weak TECHNOLOGIST PROVIDED HISTORY: Reason for exam:->covid weak What reading provider will be dictating this exam?->CRC FINDINGS: A right-sided PICC is present with the distal tip in the region of the right atrium. The lungs are without acute focal process. There is no effusion or pneumothorax. The cardiomediastinal silhouette is without acute process. The osseous structures are without acute process.     No acute process.     Xr Chest Portable     Addendum Date: 11/10/2020    ADDENDUM: There is a right-sided PICC line.   Tip of the catheter is in the superior vena cava.      Result Date: 11/10/2020  EXAMINATION: ONE XRAY VIEW OF THE CHEST 11/10/2020 2:25 pm COMPARISON: 11/04/2020 HISTORY: ORDERING SYSTEM PROVIDED HISTORY: RIGHT SIDED PICC PLACEMENT TECHNOLOGIST PROVIDED HISTORY: Reason for exam:->RIGHT SIDED PICC PLACEMENT FINDINGS: Heart size is normal.  There are no infiltrates or effusions.      Normal chest      Xr Chest Portable     Result Date: 11/4/2020  EXAMINATION: ONE XRAY VIEW OF THE CHEST 11/4/2020 12:11 pm COMPARISON: 09/10/2020 HISTORY: ORDERING SYSTEM PROVIDED HISTORY: pneumonia TECHNOLOGIST PROVIDED HISTORY: Reason for exam:->pneumonia FINDINGS: The lungs are without acute focal process. There is no effusion or pneumothorax. The cardiomediastinal silhouette is without acute process. The osseous structures are without acute process.      No acute process.      CXR 11/24/2020       CT Abdomen/Pelvis       Resident's Assessment and Plan     Neal Landers is a 69 y.o. 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     Neurology:   1. Acute encephalopathy - possibly 2/2 sepsis vs hypovolemic shock in setting of unclear baseline mental status   · Continue to monitor neurological status   · Consider contacting SNF and pt's family to elicit baseline mental status   · Continue Zoloft PO daily      Cardiology:   1.  Shock - likely hypovolemic 2/2  GI bleed vs septic in setting of prior MSSA bacteremia   · S/p 1U PRBCs and 2L IVF   · F/u H/H q8hrs   · Daily CBC, CMP, Mg, Phos, D-Dimer, and Fibrinogen,   · APTT and PT/INR q6 hrs   · Continue Protonix IV BID   · Gen Surgery consulted - no EGD until COVID negative and adequately resuscitated     · Continue to monitor pt vitals   · MAP goal >65 mmHg   · F/u NICOM - will fluid continue empiric ulcer treatment with PPI and monitor Hb q8H and transfuse to maintain > 7. Continue current management and EGD if unstable or emergent need. - Okay to remove NG tube and start clears and transfer to [facility]    Hematology/ Oncology:   1. Elevated INR in pt not on chronic anticoagulation   · Vit K given by General Surgery team   · Will transfuse FFP and re-check INR   · F/u liver US given hepatic steatosis noted on CT Abdomen/pelvis   · F/u results of mixing study   · Continue to monitor for signs of increased bleeding   · Daily D-dimer and fibrinogen   · APTT and PT/INR q6 hrs   · Remainder of plan as above     - INR is normal today; follow up results of mixing study     Infectious Disease:   1. Leukocytosis in setting of Hx of MSSA bacteremia - possibly some reactive component as well  · F/u blood and urine cx   · F/u MRSA screening   · Continue to monitor daily CBC - will trend WBCs      - Follow up blood cultures  - Completed Ancef; can discontinue at this point    Endocrine:   1. IDDM   · Last Hgb A1c 9.4   · Continue Gabapentin  · Continue Humalog Mix 25U BID w/ meals   · Continue HDSS   · POCT BG q4 hrs   · Hypoglycemia protocol in place      Code Status:   FULL       DVT ppx: N/A   GI ppx: Protonix   Disposition: Transfer to tele    Final note: NG tube can be removed and the patient can start clears per general surgery. Her hemoglobin has stabilized and she is optimized for transfer from the MICU to a monitored telemetry floor. Continue empiric ulcer treatment with PPI. She has completed her last day of Ancef, and it can be discontinued at this point. Follow mixing study results    Evelyn Dove DO, PGY-1  Attending physician: Dr. Dharmesh Curran  Department of Pulmonary, Critical Care and [facility]  Department of Internal Medicine      During multidisciplinary team rounds Gerri Max is a 69 y.o. female was seen, examined and discussed. This is confirmation that I have personally seen and examined the patient and that the key elements of the encounter were performed by me (> 85 % time). The medications & laboratory data was discussed and adjusted where necessary. The radiographic images were reviewed or with radiologist or consultant if felt dis-concordant with the exam or history. The above findings were corroborated, plans confirmed and changes made if needed. Family is updated at the bedside as available. Key issues of the case were discussed among consultants. Critical Care time is documented if appropriate.       Christopher Sands DO, FACP, FCCP, [facility],

## 2020-11-25 NOTE — PLAN OF CARE
Problem: Falls - Risk of:  Goal: Will remain free from falls  Description: Will remain free from falls  11/25/2020 1426 by Robert Chen  Outcome: Met This Shift     Problem: Falls - Risk of:  Goal: Absence of physical injury  Description: Absence of physical injury  11/25/2020 1426 by Robert Chen  Outcome: Met This Shift     Problem: Airway Clearance - Ineffective  Goal: Achieve or maintain patent airway  11/25/2020 1426 by Robert Chen  Outcome: Met This Shift     Problem: Breathing Pattern - Ineffective  Goal: Ability to achieve and maintain a regular respiratory rate  11/25/2020 1426 by Robert Chen  Outcome: Met This Shift

## 2020-11-26 NOTE — PROGRESS NOTES
Patient notified of bed assignment change, declines family notification. Nurse to nurse report called. Transport requested. Notified admitting physician of transfer by secure message.

## 2020-11-26 NOTE — PROGRESS NOTES
[facility] PROGRESS NOTE      Chief complaint: Follow-up of COVID-19 and ongoing antibiotic treatment for MSSA bacteremia    The patient is a 69 y.o. female with history of DM, hypertension, DVT, colon cancer status post right hemicolectomy and incisional hernia repair with mesh in 7354 complicated by seroma formation status post aspiration on 11/06 during which no gross infection was noted, previously admitted from 08/67-15/82 for complicated MSSA bacteremia of unclear etiology (MAGALYS unable to be done due to patient's refusal) for which she was seen by Dr. Mirtha Connelly and was given a 4-week course of cefazolin from 11/05-11/25, presented on 11/24 with tarry stools and coffee-ground emesis. On admission, she was afebrile, on 2 L/min supplemental oxygen, and hemodynamically stable with leukocytosis of 12,000. Urinalysis showed pyuria of more than 20 WBCs. Chest x-ray was unremarkable. CT abdomen and pelvis showed mild peripheral infiltrates in lower lungs, fatty infiltration of the liver status post cholecystectomy, IVC filter in place, large unchanged peripherally calcified cystic mass in the abdominal wall likely chronic seroma, near resolution of previously seen findings of duodenitis. Respiratory pathogen PCR panel was positive for SARS-CoV-2 PCR. She received a dose of ceftriaxone on admission. Cefazolin was resumed. Subjective: Patient was seen and examined. No chills, no abdominal pain, no diarrhea, no rash, no itching. She is tolerating room air. Objective:  BP (!) 146/64   Pulse 70   Temp 97.2 °F (36.2 °C) (Oral)   Resp 18   Ht 5' 5\" (1.651 m)   Wt 192 lb 9.6 oz (87.4 kg)   SpO2 95%   BMI 32.05 kg/m²   Constitutional: Alert, not in distress  Respiratory: Clear breath sounds, no crackles, no wheezes  Cardiovascular: Regular rate and rhythm, no murmurs  Gastrointestinal: Bowel sounds present, soft, nontender.  Incision scar clean and dry with no signs of gross infection  Skin: Warm and dry, no active dermatoses  Musculoskeletal: No joint swelling, no joint erythema    Labs, imaging, and medical records/notes were personally reviewed. Assessment:  MSSA bacteremia of unclear etiology. Finished 4-week course of cefazolin on 11/25. COVID-19, mild  GI bleeding    Recommendations:  Discontinue cefazolin. Monitor clinically off antibiotics for now. Follow up blood cultures. Remove PICC and use peripheral venous access instead. Monitor respiratory status. Continue supportive care.     Thank you for involving me in the care of [facility] (formerly [facility] I will continue to follow. Please do not hesitate to call for any questions or concerns.     Electronically signed by Mc Winchester MD on 11/26/2020 at 10:54 AM

## 2020-11-26 NOTE — PROGRESS NOTES
Progress Note  Date:2020       NPBO:[id number]  Patient José Miguel Thomason     YOB: 1950     Age:70 y.o. Subjective    Subjective:  Symptoms:  She reports shortness of breath, malaise, cough, weakness and anxiety. Diet:  Adequate intake. Activity level: Impaired due to weakness. Pain:  She complains of pain that is mild. She reports pain is unchanged. Pain is partially controlled. Review of Systems   Constitutional: Positive for activity change and fatigue. HENT: Positive for postnasal drip. Eyes: Negative. Respiratory: Positive for cough and shortness of breath. Cardiovascular: Negative. Gastrointestinal: Positive for abdominal distention and abdominal pain. Endocrine: Negative. Genitourinary: Negative. Musculoskeletal: Positive for arthralgias, gait problem and myalgias. Skin: Negative. Allergic/Immunologic: Negative. Neurological: Positive for weakness. Hematological: Negative. Psychiatric/Behavioral: Positive for confusion. The patient is nervous/anxious. Objective         Vitals Last 24 Hours:  TEMPERATURE:  Temp  Av.2 °F (36.8 °C)  Min: 97.8 °F (36.6 °C)  Max: 98.8 °F (37.1 °C)  RESPIRATIONS RANGE: Resp  Av.7  Min: 11  Max: 30  PULSE OXIMETRY RANGE: SpO2  Av.4 %  Min: 92 %  Max: 100 %  PULSE RANGE: Pulse  Av.6  Min: 69  Max: 85  BLOOD PRESSURE RANGE: Systolic (38EJA), GKF:536 , Min:111 , TLZ:770   ; Diastolic (39UTO), QVT:48, Min:48, Max:98    I/O (24Hr): Intake/Output Summary (Last 24 hours) at 2020 0703  Last data filed at 2020 0415  Gross per 24 hour   Intake 936 ml   Output 1450 ml   Net -514 ml     Objective:  General Appearance:  Uncomfortable. Vital signs: (most recent): Blood pressure (!) 126/48, pulse 72, temperature 97.8 °F (36.6 °C), temperature source Oral, resp. rate 16, height 5' 5\" (1.651 m), weight 192 lb 9.6 oz (87.4 kg), SpO2 95 %.   Vital signs are normal.    Output: Producing urine and producing stool. HEENT: Normal HEENT exam.    Lungs:  Normal effort. There are decreased breath sounds. Heart: Normal rate. Regular rhythm. Abdomen: Abdomen is soft. Bowel sounds are normal.   There is no abdominal tenderness. There is a mass. Extremities: Decreased range of motion. Pulses: Distal pulses are intact. Neurological: Patient is alert and oriented to person, place and time. Pupils:  Pupils are equal, round, and reactive to light. Skin:  Dry and pale. Labs/Imaging/Diagnostics    Labs:  CBC:  Recent Labs     11/25/20  1630 11/25/20  2355 11/26/20  0420   WBC 11.7* 9.8 9.1   RBC 2.74* 2.66* 2.74*   HGB 7.5* 7.3* 7.5*   HCT 23.7* 23.2* 24.4*   MCV 86.5 87.2 89.1   RDW 16.6* 16.7* 17.0*    194 192     CHEMISTRIES:  Recent Labs     11/24/20  0845 11/25/20  0500 11/25/20  1251 11/26/20  0420    151* 141 139   K 3.2* 3.2* 4.4 3.9    112* 106 105   CO2 21* 29 22 26   BUN 28* 19 16 13   CREATININE 1.0 0.7 0.7 0.7   GLUCOSE 224* 98 161* 125*   PHOS  --  2.4*  --  2.6   MG 1.6 1.8  --  1.6     PT/INR:  Recent Labs     11/25/20  1630 11/25/20  2355 11/26/20  0420   PROTIME 13.0* 13.2* 12.3   INR 1.2 1.2 1.1     APTT:  Recent Labs     11/25/20  1630 11/25/20  2355 11/26/20  0420   APTT 22.4* 23.5* 24.8     LIVER PROFILE:  Recent Labs     11/24/20  0845 11/25/20  0500 11/26/20  0420   AST 30 23 20   ALT <5 <5 <5   BILITOT 0.4 0.5 0.5   ALKPHOS 63 62 65       Imaging Last 24 Hours:  Us Abdomen Limited Specify Organ?  Liver    Result Date: 11/25/2020  EXAMINATION: RIGHT UPPER QUADRANT ULTRASOUND 11/25/2020 5:02 pm COMPARISON: CT abdomen and pelvis obtained on November 24, 2020 HISTORY: ORDERING SYSTEM PROVIDED HISTORY: evaluate her liver for NAFLD vs Cirrhosis TECHNOLOGIST PROVIDED HISTORY: Reason for exam:->evaluate her liver for NAFLD vs Cirrhosis Specify organ?->LIVER What reading provider will be dictating this exam?->CRC FINDINGS: LIVER:  The liver demonstrates increased echogenicity relative to the right renal cortex without evidence of intrahepatic biliary ductal dilatation. Normal color flow and spectral Doppler waveform identified within the main portal vein. BILIARY SYSTEM:  Gallbladder is surgically absent. Common bile duct is within normal limits measuring 4 mm. RIGHT KIDNEY: Corticomedullary differentiation and cortical thickness is maintained. No hydronephrosis. 3.0 cm right midpole renal cyst.  This is avascular on color Doppler interrogation. Right renal length was 10.7 cm. PANCREAS:  Visualized portions of the pancreas are unremarkable. OTHER: No evidence of right upper quadrant ascites. 1.  Diffusely increased echogenicity of the liver is suggestive of an underlying infiltrative pathology the most common of which is hepatic steatosis. Please consider correlation with patient's liver function test. 2.  Status post cholecystectomy. 3.  3.0 cm right midpole renal cyst.    Assessment//Plan           Hospital Problems           Last Modified POA    * (Principal) GI bleed 11/25/2020 Yes    Hypertension 11/25/2020 Yes    Overview Signed 11/6/2020  6:44 AM by Zoltan Meza DO     pt states \"pretty good\"; on no meds         Diabetes mellitus ([address] 11/25/2020 Yes    Asthma 11/25/2020 Yes    Overview Signed 11/6/2020  6:44 AM by Zoltan Meza,      no recent attacks past 18 months         Abdominal wall seroma 11/25/2020 Yes    Class 2 obesity in adult 11/25/2020 Yes    MSSA bacteremia 11/25/2020 Yes    Hypovolemic shock ([address] 11/25/2020 Yes    COVID-19 11/25/2020 Yes        Assessment:    Condition: In stable condition. Improving. Plan:   Encourage ambulation, per physical therapy and out of bed and up to chair. Continue respiratory treatments and wean off oxygen. Consults: pulmonology, general surgery, , physical therapy and occupational therapy. Regular diet. Administer medications as ordered. (Continue present medical regimen. Increase activity as tolerated. Monitor H/H and treat accordingly. Surgery to do endoscopy based on COVID status. Will follow. ).        Electronically signed by Colt Persaud DO on 11/26/20 at 7:03 AM EST

## 2020-11-26 NOTE — PROGRESS NOTES
Pt constantly moaning and saying \"help me\" and cries out whenever touched. Per shift handoff this is not new. Pt refusing all oral medications despite instruction.

## 2020-11-27 NOTE — PROGRESS NOTES
General Progress Note  11/27/2020 6:51 AM  Subjective:   Admit Date: 11/24/2020  PCP: Martin Joshi DO  Interval History: no acute issues overnight. Antibiotics stopped per ID. PICC line to be pulled. Diet: DIET PEPTIC ULCER;  Pain is:None  Nausea:None  Bowel Movement/Flatus yes    Data:   Scheduled Meds:   budesonide-formoterol  2 puff Inhalation BID    sucralfate  1 g Oral 4x Daily WC    oxymetazoline  2 spray Each Nostril Once    lidocaine   Topical Once    gabapentin  800 mg Oral TID    montelukast  10 mg Oral Nightly    sertraline  100 mg Oral Daily    sodium chloride flush  10 mL Intravenous 2 times per day    insulin lispro  0-18 Units Subcutaneous Q4H    insulin lispro prot & lispro  25 Units Subcutaneous BID WC    pantoprazole  40 mg Intravenous BID    And    sodium chloride (PF)  10 mL Intravenous Daily    lidocaine PF  5 mL Intradermal Once    heparin flush  3 mL Intravenous 2 times per day     Continuous Infusions:   dextrose       PRN Meds:mineral oil-hydrophilic petrolatum, albuterol, sodium chloride flush, acetaminophen **OR** acetaminophen, promethazine **OR** ondansetron, glucose, dextrose, glucagon (rDNA), dextrose, sodium chloride flush, heparin flush  I/O last 3 completed shifts:   In: 540 [P.O.:540]  Out: 750 [Urine:750]  I/O this shift:  In: 120 [P.O.:120]  Out: 350 [Urine:350]    Intake/Output Summary (Last 24 hours) at 11/27/2020 0651  Last data filed at 11/27/2020 0515  Gross per 24 hour   Intake 600 ml   Output 750 ml   Net -150 ml       CBC with Differential:    Lab Results   Component Value Date    WBC 7.6 11/27/2020    RBC 2.89 11/27/2020    HGB 8.0 11/27/2020    HCT 25.7 11/27/2020     11/27/2020    MCV 88.9 11/27/2020    MCH 27.7 11/27/2020    MCHC 31.1 11/27/2020    RDW 17.4 11/27/2020    SEGSPCT 86 08/08/2013    LYMPHOPCT 17.8 11/27/2020    MONOPCT 6.1 11/27/2020    BASOPCT 0.4 11/27/2020    MONOSABS 0.46 11/27/2020    LYMPHSABS 1.35 11/27/2020 EOSABS 0.30 11/27/2020    BASOSABS 0.03 11/27/2020     CMP:    Lab Results   Component Value Date     11/27/2020    K 3.8 11/27/2020    K 3.2 11/24/2020     11/27/2020    CO2 28 11/27/2020    BUN 10 11/27/2020    CREATININE 0.7 11/27/2020    GFRAA >60 11/27/2020    LABGLOM >60 11/27/2020    GLUCOSE 112 11/27/2020    GLUCOSE 149 04/18/2012    PROT 6.0 11/27/2020    LABALBU 2.5 11/27/2020    LABALBU 4.0 04/18/2012    CALCIUM 8.5 11/27/2020    BILITOT 0.5 11/27/2020    ALKPHOS 62 11/27/2020    AST 18 11/27/2020    ALT <5 11/27/2020     Magnesium:    Lab Results   Component Value Date    MG 1.5 11/27/2020     Phosphorus:    Lab Results   Component Value Date    PHOS 2.6 11/27/2020     PT/INR:    Lab Results   Component Value Date    PROTIME 12.3 11/26/2020    INR 1.1 11/26/2020     Last 3 Troponin:    Lab Results   Component Value Date    TROPONINI <0.01 11/24/2020    TROPONINI <0.01 11/24/2020    TROPONINI <0.01 11/24/2020     U/A:    Lab Results   Component Value Date    COLORU Yellow 11/24/2020    PHUR 6.5 11/24/2020    WBCUA >20 11/24/2020    WBCUA NONE 12/31/2011    RBCUA 1-3 11/24/2020    RBCUA NONE 12/31/2011    BACTERIA FEW 11/24/2020    CLARITYU Clear 11/24/2020    SPECGRAV 1.015 11/24/2020    LEUKOCYTESUR SMALL 11/24/2020    UROBILINOGEN 0.2 11/24/2020    BILIRUBINUR Negative 11/24/2020    BILIRUBINUR NEGATIVE 12/31/2011    BLOODU Negative 11/24/2020    GLUCOSEU Negative 11/24/2020    GLUCOSEU 250 12/31/2011     HgBA1c:  No components found for: HGBA1C  TSH:    Lab Results   Component Value Date    TSH 1.380 06/24/2013     -----------------------------------------------------------------    Objective:   Vitals: BP (!) 140/64   Pulse 73   Temp 96.8 °F (36 °C) (Oral)   Resp 22   Ht 5' 5\" (1.651 m)   Wt 192 lb 9.6 oz (87.4 kg)   SpO2 95%   BMI 32.05 kg/m²   General appearance: alert, appears stated age and cooperative  Skin: Skin color, texture, turgor normal. No rashes or lesions  HEENT: Head: Normal, normocephalic, atraumatic. Neck: no adenopathy, no carotid bruit, no JVD, supple, symmetrical, trachea midline and thyroid not enlarged, symmetric, no tenderness/mass/nodules  Lungs: diminished breath sounds bibasilar  Heart: regular rate and rhythm, S1, S2 normal, no murmur, click, rub or gallop  Abdomen: soft, non-tender; bowel sounds normal; no masses,  no organomegaly  Extremities: extremities normal, atraumatic, no cyanosis or edema  Neurologic: Mental status: Alert, oriented, thought content appropriate    Assessment:   Principal Problem:    GI bleed  Active Problems:    Hypertension    Diabetes mellitus (HCC)    Asthma    Abdominal wall seroma    Class 2 obesity in adult    MSSA bacteremia    Hypovolemic shock ([address]    COVID-19  Resolved Problems:    * No resolved hospital problems. *    Plan:   1. Monitor H/H.  2. Off antibiotics per ID.  3. Will labs and treat accordingly.     Wyatt Perrin D.O.  6:51 AM  11/27/2020

## 2020-11-27 NOTE — DISCHARGE INSTR - COC
Continuity of Care Form    Patient Name: Evelyn President   :  1950  MRN:  [id number]    Admit date:  2020  Discharge date:  ***    Code Status Order: Full Code   Advance Directives:      Admitting Physician:  Colt Persaud DO  PCP: Colt Persaud DO    Discharging Nurse: [facility] Unit/Room#: 5343/2464-M  Discharging Unit Phone Number: ***    Emergency Contact:   Extended Emergency Contact Information  Primary Emergency Contact: Valente Barrett  Address: [address] [place] [address] Phone: [phone]  Mobile Phone: [phone]  Relation: Spouse   needed? No    Past Surgical History:  Past Surgical History:   Procedure Laterality Date    ABDOMINAL HERNIA REPAIR  12    CHOLECYSTECTOMY      COLON SURGERY      partial resection    INSERT PICC LINE  11/10/2020         OVARY REMOVAL      bilateral       Immunization History: There is no immunization history for the selected administration types on file for this patient. Active Problems:  Patient Active Problem List   Diagnosis Code    Hernia, abdominal K46.9    Postoperative incisional hernia K43.2    UTI (urinary tract infection) N39.0    Hypertension I10    DVT (deep venous thrombosis) (HCC) I82.409    Diabetes mellitus (HCC) E11.9    Colon cancer (HCC) C18.9    Asthma J45.909    Pulmonary embolism (HCC) I26.99    Arthritis M19.90    Hyponatremia E87.1    Abdominal wall seroma S30. 1XXA    Class 2 obesity in adult E66.9    MSSA bacteremia R78.81, B95.61    GI bleed K92.2    Hypovolemic shock (HCC) R57.1    COVID-19 U07.1       Isolation/Infection:   Isolation            Droplet Plus          Patient Infection Status       Infection Onset Added Last Indicated Last Indicated By Review Planned Expiration Resolved Resolved By    COVID-19 20 Respiratory Panel, Molecular, with COVID-19 (Restricted: peds pts or suitable admitted adults) 12/01/20 12/08/20      Resolved    COVID-19 Rule Out 11/24/20 11/24/20 11/24/20 Respiratory Panel, Molecular, with COVID-19 (Restricted: peds pts or suitable admitted adults) (Ordered)   11/24/20 Rule-Out Test Resulted    COVID-19 Rule Out 11/06/20 11/06/20 11/06/20 Covid-19 Ambulatory (Ordered)   11/08/20 Rule-Out Test Resulted            Nurse Assessment:  Last Vital Signs: /64   Pulse 67   Temp 97.2 °F (36.2 °C) (Oral)   Resp 20   Ht 5' 5\" (1.651 m)   Wt 192 lb 9.6 oz (87.4 kg)   SpO2 95%   BMI 32.05 kg/m²     Last documented pain score (0-10 scale): Pain Level: 0  Last Weight:   Wt Readings from Last 1 Encounters:   11/26/20 192 lb 9.6 oz (87.4 kg)     Mental Status:  {IP PT MENTAL STATUS:20030}    IV Access:  { NADER IV ACCESS:967568732}    Nursing Mobility/ADLs:  Walking   {CHP DME HPFN:951977581}  Transfer  {CHP DME HBXC:361219691}  Bathing  {CHP DME PZWV:109011654}  Dressing  {CHP DME KFQY:679767314}  Toileting  {CHP DME HSAZ:249281467}  Feeding  {CHP DME LEJK:618896086}  Med Admin  {P DME KRFK:770727134}  Med Delivery   {[facility] MED Delivery:943441006}    Wound Care Documentation and Therapy:  Wound 11/04/20 Abdomen (Active)   Dressing Status Other (Comment) 11/26/20 1644   Dressing/Treatment Open to air 11/26/20 1644   Wound Assessment Dry;Pink/red; Other (Comment) 11/26/20 1644   Number of days: 22        Elimination:  Continence: Bowel: {YES / NV:96633}  Bladder: {YES / DN:43050}  Urinary Catheter: {Urinary Catheter:968333162}   Colostomy/Ileostomy/Ileal Conduit: {YES / KQ:93956}       Date of Last BM: ***    Intake/Output Summary (Last 24 hours) at 11/27/2020 1435  Last data filed at 11/27/2020 1400  Gross per 24 hour   Intake 1110 ml   Output 1150 ml   Net -40 ml     I/O last 3 completed shifts:   In: 600 [P.O.:600]  Out: 750 [Urine:750]    Safety Concerns:     [address] DOE Safety Concerns:144688523}    Impairments/Disabilities:      [address] DOE Impairments/Disabilities:715254674}    Nutrition Therapy:  Current Nutrition Therapy:   [address] Webster NADER Diet List:033523157}    Routes of Feeding: {CHP DME Other Feedings:682830466}  Liquids: {Slp liquid thickness:74467}  Daily Fluid Restriction: {CHP DME Yes amt example:901672399}  Last Modified Barium Swallow with Video (Video Swallowing Test): {Done Not Done GESN:487256293}    Treatments at the Time of Hospital Discharge:   Respiratory Treatments: ***  Oxygen Therapy:  {Therapy; copd oxygen:12314}  Ventilator:    { CC Vent REEX:504702890}    Rehab Therapies: {THERAPEUTIC INTERVENTION:8086388606}  Weight Bearing Status/Restrictions: [address] CONROY Weight Bearin}  Other Medical Equipment (for information only, NOT a DME order):  {EQUIPMENT:233068919}  Other Treatments: ***    Patient's personal belongings (please select all that are sent with patient):  {[facility] DME Belongings:652759414}    RN SIGNATURE:  {Esignature:685396407}    CASE MANAGEMENT/SOCIAL WORK SECTION    Inpatient Status Date: 20    Readmission Risk Assessment Score:  Readmission Risk              Risk of Unplanned Readmission:        28           Discharging to Facility/ Agency   Name:  at [facility]  Address:  Phone:  Fax:    Dialysis Facility (if applicable)   Name:  Address:  Dialysis Schedule:  Phone:  Fax:    / signature: Electronically signed by SHARLA Anderson on 20 at 2:36 PM EST    PHYSICIAN SECTION    Prognosis: Good    Condition at Discharge: Stable    Rehab Potential (if transferring to Rehab): {Prognosis:9228159258}    Recommended Labs or Other Treatments After Discharge: ***    Physician Certification: I certify the above information and transfer of Katie Sanchez  is necessary for the continuing treatment of the diagnosis listed and that she requires [place] for less 30 days.      Update Admission H&P: [facility]680[facility]}    PHYSICIAN SIGNATURE:  Alesia Saul MD.

## 2020-11-27 NOTE — PLAN OF CARE
Problem: Falls - Risk of:  Goal: Will remain free from falls  Description: Will remain free from falls  Outcome: Met This Shift  Goal: Absence of physical injury  Description: Absence of physical injury  Outcome: Met This Shift     Problem: Airway Clearance - Ineffective  Goal: Achieve or maintain patent airway  Outcome: Met This Shift

## 2020-11-27 NOTE — PLAN OF CARE
Problem: Falls - Risk of:  Goal: Will remain free from falls  Description: Will remain free from falls  Outcome: Met This Shift  Goal: Absence of physical injury  Description: Absence of physical injury  Outcome: Met This Shift     Problem: Airway Clearance - Ineffective  Goal: Achieve or maintain patent airway  Outcome: Met This Shift     Problem: Gas Exchange - Impaired  Goal: Absence of hypoxia  Outcome: Met This Shift  Goal: Promote optimal lung function  Outcome: Met This Shift     Problem: Breathing Pattern - Ineffective  Goal: Ability to achieve and maintain a regular respiratory rate  Outcome: Met This Shift     Problem:  Body Temperature -  Risk of, Imbalanced  Goal: Ability to maintain a body temperature within defined limits  Outcome: Met This Shift  Goal: Will regain or maintain usual level of consciousness  Outcome: Met This Shift  Goal: Complications related to the disease process, condition or treatment will be avoided or minimized  Outcome: Met This Shift     Problem: Isolation Precautions - Risk of Spread of Infection  Goal: Prevent transmission of infection  Outcome: Met This Shift     Problem: Nutrition Deficits  Goal: Optimize nutrtional status  Outcome: Met This Shift     Problem: Risk for Fluid Volume Deficit  Goal: Maintain normal heart rhythm  Outcome: Met This Shift  Goal: Maintain absence of muscle cramping  Outcome: Met This Shift  Goal: Maintain normal serum potassium, sodium, calcium, phosphorus, and pH  Outcome: Met This Shift     Problem: Loneliness or Risk for Loneliness  Goal: Demonstrate positive use of time alone when socialization is not possible  Outcome: Met This Shift     Problem: Fatigue  Goal: Verbalize increase energy and improved vitality  Outcome: Met This Shift     Problem: Patient Education: Go to Patient Education Activity  Goal: Patient/Family Education  Outcome: Met This Shift     Problem: Skin Integrity:  Goal: Will show no infection signs and symptoms  Description: Will show no infection signs and symptoms  Outcome: Met This Shift  Goal: Absence of new skin breakdown  Description: Absence of new skin breakdown  Outcome: Met This Shift

## 2020-11-27 NOTE — PROGRESS NOTES
SUZANNA PROGRESS NOTE      Chief complaint: Follow-up of COVID-19 and ongoing antibiotic treatment for MSSA bacteremia    The patient is a 69 y.o. female with history of DM, hypertension, DVT, colon cancer status post right hemicolectomy and incisional hernia repair with mesh in 1856 complicated by seroma formation status post aspiration on 11/06 during which no gross infection was noted, previously admitted from 06/68-40/23 for complicated MSSA bacteremia of unclear etiology (MAGALYS unable to be done due to patient's refusal) for which she was seen by Dr. Ismael Wheat and was given a 4-week course of cefazolin from 11/05-11/25, presented on 11/24 with tarry stools and coffee-ground emesis. On admission, she was afebrile, on 2 L/min supplemental oxygen, and hemodynamically stable with leukocytosis of 12,000. Urinalysis showed pyuria of more than 20 WBCs. Chest x-ray was unremarkable. CT abdomen and pelvis showed mild peripheral infiltrates in lower lungs, fatty infiltration of the liver status post cholecystectomy, IVC filter in place, large unchanged peripherally calcified cystic mass in the abdominal wall likely chronic seroma, near resolution of previously seen findings of duodenitis. Respiratory pathogen PCR panel was positive for SARS-CoV-2 PCR. She received a dose of ceftriaxone on admission. Cefazolin was resumed. Subjective: Patient was seen and examined. No chills, no abdominal pain, no diarrhea, no rash, no itching. She is tolerating room air. She is insisting on going home today. Objective:  BP (!) 140/64   Pulse 73   Temp 96.8 °F (36 °C) (Oral)   Resp 22   Ht 5' 5\" (1.651 m)   Wt 192 lb 9.6 oz (87.4 kg)   SpO2 95%   BMI 32.05 kg/m²   Constitutional: Alert, not in distress  Respiratory: Clear breath sounds, no crackles, no wheezes  Cardiovascular: Regular rate and rhythm, no murmurs  Gastrointestinal: Bowel sounds present, soft, nontender.  Incision scar clean and dry with no signs of gross infection  Skin: Warm and dry, no active dermatoses  Musculoskeletal: No joint swelling, no joint erythema    Labs, imaging, and medical records/notes were personally reviewed. Assessment:  MSSA bacteremia of unclear etiology. Finished 4-week course of cefazolin on 11/25. COVID-19, mild  GI bleeding    Recommendations:  Monitor clinically off antibiotics for now. Follow up blood cultures. Monitor respiratory status. Continue supportive care.     Thank you for involving me in the care of [facility]. I will sign off for now. Please do not hesitate to call for any questions, concerns, or new findings.       Electronically signed by Alessandro Dove MD on 11/27/2020 at 8:29 AM

## 2020-11-28 NOTE — PLAN OF CARE
Problem: Falls - Risk of:  Goal: Will remain free from falls  Description: Will remain free from falls  Outcome: Met This Shift  Goal: Absence of physical injury  Description: Absence of physical injury  Outcome: Met This Shift     Problem: Airway Clearance - Ineffective  Goal: Achieve or maintain patent airway  Outcome: Met This Shift     Problem: Gas Exchange - Impaired  Goal: Absence of hypoxia  Outcome: Met This Shift  Goal: Promote optimal lung function  Outcome: Met This Shift     Problem: Breathing Pattern - Ineffective  Goal: Ability to achieve and maintain a regular respiratory rate  Outcome: Met This Shift     Problem:  Body Temperature -  Risk of, Imbalanced  Goal: Ability to maintain a body temperature within defined limits  Outcome: Met This Shift  Goal: Will regain or maintain usual level of consciousness  Outcome: Met This Shift  Goal: Complications related to the disease process, condition or treatment will be avoided or minimized  Outcome: Met This Shift     Problem: Isolation Precautions - Risk of Spread of Infection  Goal: Prevent transmission of infection  Outcome: Met This Shift     Problem: Nutrition Deficits  Goal: Optimize nutrtional status  Outcome: Met This Shift     Problem: Risk for Fluid Volume Deficit  Goal: Maintain normal heart rhythm  Outcome: Met This Shift  Goal: Maintain absence of muscle cramping  Outcome: Met This Shift  Goal: Maintain normal serum potassium, sodium, calcium, phosphorus, and pH  Outcome: Met This Shift     Problem: Fatigue  Goal: Verbalize increase energy and improved vitality  Outcome: Met This Shift     Problem: Loneliness or Risk for Loneliness  Goal: Demonstrate positive use of time alone when socialization is not possible  Outcome: Met This Shift     Problem: Patient Education: Go to Patient Education Activity  Goal: Patient/Family Education  Outcome: Met This Shift     Problem: Skin Integrity:  Goal: Will show no infection signs and symptoms  Description:

## 2020-11-28 NOTE — CARE COORDINATION
SOCIAL WORK/CASEMANAGEMENT TRANSITION OF CARE [id number] Bethany Alvarenga, [address], [place], [phone]): discharge back to [facility] today.  [facility] Snf;loc. Called and left vm for spouse.  Teresa   11/28/2020

## 2020-11-28 NOTE — PROGRESS NOTES
Physician Progress Note      Becky Alvarez  CSN #:                  [id number]  :                       1950  ADMIT DATE:       2020 1:25 AM  DISCH DATE:  RESPONDING  PROVIDER #:        ABIGAIL KLINE DO          QUERY TEXT:    Pt admitted with GIB with hematemesis . Pt noted to have anemia . If possible,   please document in the progress notes and discharge summary if you are   evaluating and/or treating any of the following: The medical record reflects the following:  Risk Factors: covid 19 +, hx colon CA, PE/DVT-on asa , recent tx for MSSA   bacteremia  Clinical Indicators: HGB 7.2 (BL 11/10 9.3), LA 10.6, Per ED patient being   hypotensive and having active hemorrhage-tachycardia, Per CC consult suspected   UGIB-ct with healing duodenitis , shock hypovolemic 2/2 GIB, ^ INR not on   coagulation, D-dimer 565, Pt 27. 2/INR 2.4  Treatment: 2 U ffp, 2 units blood, 2 L IVF, IV protonix, labs, tests and   monitoring in ICU  Thank you, Peg Berhane GALLAGHER BSN CCDS  contact number [phone]  Options provided:  -- Acute blood loss anemia  -- Chronic blood loss anemia  -- Acute on chronic blood loss anemia  -- Dilutional anemia  -- Other - I will add my own diagnosis  -- Disagree - Not applicable / Not valid  -- Disagree - Clinically unable to determine / Unknown  -- Refer to Clinical Documentation Reviewer    PROVIDER RESPONSE TEXT:    This patient has acute blood loss anemia.     Query created by: Neil Yang on 2020 7:49 AM      Electronically signed by:  Daylin Montana DO 2020 8:23 AM

## 2020-11-29 NOTE — DISCHARGE SUMMARY
Discharge Summary    Date: 11/29/2020  Patient Name: Morales Araujo YOB: 1950 Age: 69 y.o. Admit Date: 11/24/2020  Discharge Date: 11/28/2020  Discharge Condition: [address]    Admission Diagnosis  GI bleed (K92.2)     Discharge Diagnosis  Principal Problem: GI bleedActive Problems: Hypertension Diabetes mellitus ([address] Asthma Abdominal wall seroma Class 2 obesity in adult  MSSA bacteremia  Hypovolemic shock (HCC)  COVID-19Resolved Problems:  * No resolved hospital problems. [facility] Stay  Narrative of Hospital Course:  NH patient admitted with dark stool known with colon Ca S/P hemicolectomy seen by surgery treated conservatively stabilized transferred back to NH     Consultants:  Gilbert    Surgeries/procedures Performed:       Treatments:            Discharge Plan/Disposition:  To [facility]/Incidental Findings Requiring Follow Up:    Patient Instructions:    Diet: Regular Diet    Activity:Activity as Tolerated  For number of days (if applicable): Other Instructions:    Provider Follow-Up:   No follow-ups on file. Significant Diagnostic Studies:    Recent Labs:  Admission on 11/24/2020, Discharged on 11/28/2020No results displayed because visit has over 200 results. ------------    Radiology last 7 days:  Ct Abdomen Pelvis W Iv Contrast Additional Contrast? NoneResult Date: 11/24/2020Unchanged peripherally calcified cystic mass in the abdominal wall is likely a chronic seroma. Infiltrates at visualized lung bases. Fatty liver. Coronary artery and aortic calcified plaque. Near resolution of previously seen findings of duodenitis. Xr Chest PortableResult Date: 11/24/2020No acute process. Us Abdomen Limited Specify Organ? LiverResult Date: 11/25/20201.   Diffusely increased echogenicity of the liver is suggestive of an underlying infiltrative pathology the most Stopping:glyBURIDE (DIABETA) 5 MG tabletComments:Reason for Stopping:metformin (GLUCOPHAGE) 1000 MG tabletComments:Reason for Stopping:NONFORMULARYComments:Reason for Stopping:furosemide (LASIX) 20 MG tabletComments:Reason for Stopping:metolazone (ZAROXOLYN) 2.5 MG tabletComments:Reason for Stopping:    Time Spent on Discharge:3E] minutes were spent in patient examination, evaluation, counseling as well as medication reconciliation, prescriptions for required medications, discharge plan, and follow up.     Electronically signed by Janina Duff MD on 11/29/20 at 10:18 AM EST

## 2020-12-02 PROBLEM — I26.99 ACUTE PULMONARY EMBOLISM WITHOUT ACUTE COR PULMONALE (HCC): Status: ACTIVE | Noted: 2020-01-01

## 2020-12-02 NOTE — ED NOTES
Critical lactic communicated to [facility] \[facility] [place] [facility]\"" VALLEY WHEELING, RN  12/02/20 [id number]

## 2020-12-02 NOTE — ED NOTES
called inquiring about pt's condition, told by Mya that she was unresponsive.   will call back in 2 hrs to get an update     Tyra Cox RN  12/02/20 [id number]

## 2020-12-02 NOTE — ED PROVIDER NOTES
ED PROVIDER NOTE    Chief Complaint   Patient presents with    Hypotension     Currently 72/48. Hx of GI bleeds. HPI:  12/2/20,   Time: 2:55 PM JAIRO Loyola is a 69 y.o. female presenting to the ED for hypotension. Patient sent from skilled nursing facility for hypotension. Recently admitted for GI bleed managed conservatively and discharged home. Has known history of colon cancer. Said she was feeling fine since recent discharge until this morning. Now feeling more fatigued. Patient denies dizziness, lightheadedness, or syncope. Report from nursing facility does mention episode of syncope. Patient does report upper abdominal pain, constant, throbbing, nonradiating, no aggravating or alleviating factors. Associated shortness of breath, nausea and one episode of emesis this morning. Not on anticoagulants. No chest pain, fever, chills. Chart review: hx of DVT, PE, colon ca s/p resection, DM, asthma, HTN. Admitted 11/24-28 for dark stool    Review of Systems:     Review of Systems   Constitutional: Positive for fatigue. Negative for appetite change, chills and fever. HENT: Negative for congestion and rhinorrhea. Eyes: Negative for visual disturbance. Respiratory: Positive for shortness of breath. Negative for cough. Cardiovascular: Negative for chest pain. Gastrointestinal: Positive for abdominal pain, blood in stool, nausea and vomiting. Genitourinary: Negative for decreased urine volume and difficulty urinating. Musculoskeletal: Negative for back pain and neck pain. Skin: Negative for color change.    Neurological: Negative for dizziness, syncope, weakness, light-headedness, numbness and headaches.         --------------------------------------------- PAST HISTORY ---------------------------------------------  Past Medical History:   Past Medical History:   Diagnosis Date    Arthritis     Asthma     no recent attacks past 18 months    Colon cancer ([facility] 2006 colon CA    Diabetes mellitus ([address]     DVT (deep venous thrombosis) ([address] 2006    Hernia of abdominal cavity     Hypertension     pt states \"pretty good\"; on no meds    Other disorders of kidney and ureter     kidney stone    PE (pulmonary embolism) 2006       Past Surgical History:   Past Surgical History:   Procedure Laterality Date    ABDOMINAL HERNIA REPAIR  8/5/13    CHOLECYSTECTOMY      COLON SURGERY      partial resection    INSERT PICC LINE  11/10/2020         OVARY REMOVAL      bilateral       Social History:   Social History     Socioeconomic History    Marital status:      Spouse name: None    Number of children: None    Years of education: None    Highest education level: None   Occupational History    None   Social Needs    Financial resource strain: None    Food insecurity     Worry: None     Inability: None    Transportation needs     Medical: None     Non-medical: None   Tobacco Use    Smoking status: Never Smoker    Smokeless tobacco: Never Used   Substance and Sexual Activity    Alcohol use: No    Drug use: No    Sexual activity: Never   Lifestyle    Physical activity     Days per week: None     Minutes per session: None    Stress: None   Relationships    Social connections     Talks on phone: None     Gets together: None     Attends Sikhism service: None     Active member of club or organization: None     Attends meetings of clubs or organizations: None     Relationship status: None    Intimate partner violence     Fear of current or ex partner: None     Emotionally abused: None     Physically abused: None     Forced sexual activity: None   Other Topics Concern    None   Social History Narrative    None       Family History:   History reviewed. No pertinent family history. The patients home medications have been reviewed. Allergies:    Allergies   Allergen Reactions    Fluticasone-Salmeterol      Causes eye blindness in patient ---------------------------------------------------PHYSICAL EXAM--------------------------------------    BP (!) 72/48   Pulse 93   Temp 97.6 °F (36.4 °C) (Temporal)   Resp 16   Wt 192 lb (87.1 kg)   SpO2 96%   BMI 31.95 kg/m²     Physical Exam  Vitals signs and nursing note reviewed. Constitutional:       General: She is not in acute distress. Appearance: She is not toxic-appearing. HENT:      Mouth/Throat:      Mouth: Mucous membranes are dry. Eyes:      General: No scleral icterus. Extraocular Movements: Extraocular movements intact. Pupils: Pupils are equal, round, and reactive to light. Neck:      Musculoskeletal: Normal range of motion and neck supple. Cardiovascular:      Rate and Rhythm: Regular rhythm. Tachycardia present. Pulses: Normal pulses. Heart sounds: Normal heart sounds. No murmur. Pulmonary:      Effort: Pulmonary effort is normal. No respiratory distress. Breath sounds: Normal breath sounds. No wheezing or rales. Abdominal:      General: There is no distension. Palpations: Abdomen is soft. Tenderness: There is abdominal tenderness (epigastric, RUQ). There is no guarding or rebound. Comments: Chronic wound to right mid abdomen   Musculoskeletal: Normal range of motion. General: No swelling or tenderness. Skin:     General: Skin is warm and dry. Neurological:      Mental Status: She is alert and oriented to person, place, and time. Comments: Moves all extremities with appropriate strength. Sensation grossly intact in all extremities. -------------------------------------------------- RESULTS -------------------------------------------------  I have personally reviewed all laboratory and imaging results for this patient. Results are listed below.      LABS:  Labs Reviewed   CBC WITH AUTO DIFFERENTIAL - Abnormal; Notable for the following components:       Result Value    WBC 14.4 (*)     Hemoglobin 9.5 (*)     Hematocrit 32.9 (*)     MCHC 28.9 (*)     RDW 18.2 (*)     Neutrophils % 87.8 (*)     Lymphocytes % 6.1 (*)     Neutrophils Absolute 12.82 (*)     Lymphocytes Absolute 0.86 (*)     All other components within normal limits   HEPATIC FUNCTION PANEL - Abnormal; Notable for the following components:    Alb 2.9 (*)     AST 66 (*)     All other components within normal limits   BASIC METABOLIC PANEL - Abnormal; Notable for the following components:    CO2 17 (*)     Anion Gap 19 (*)     Glucose 405 (*)     CREATININE 1.2 (*)     All other components within normal limits   LACTIC ACID, PLASMA - Abnormal; Notable for the following components:    Lactic Acid 8.0 (*)     All other components within normal limits    Narrative:     CALL  [place]  H34 tel. ,  Chemistry results called to and read back by AILEEN Durand, 12/02/2020  16:34, by Noa Gannon   URINALYSIS WITH MICROSCOPIC - Abnormal; Notable for the following components:    Glucose, Ur 250 (*)     Protein, UA 30 (*)     WBC, UA 10-20 (*)     Bacteria, UA RARE (*)     Yeast, UA Present (*)     All other components within normal limits   CBC - Abnormal; Notable for the following components:    WBC 11.7 (*)     RBC 3.01 (*)     Hemoglobin 8.3 (*)     Hematocrit 26.7 (*)     MCHC 31.1 (*)     RDW 18.2 (*)     All other components within normal limits   APTT - Abnormal; Notable for the following components:    aPTT 23.8 (*)     All other components within normal limits   CULTURE, URINE   MAGNESIUM   PROTIME-INR   APTT   BETA-HYDROXYBUTYRATE   PH, VENOUS   PROCALCITONIN   APTT   APTT   LACTIC ACID, PLASMA    Narrative:     Draw gray top tube-place on ice.~Separate plasma-keep cold. BRAIN NATRIURETIC PEPTIDE   TROPONIN   POCT OCCULT BLOOD STOOL COLON CA SCREEN 1-3   TYPE AND SCREEN       RADIOLOGY:  Interpreted personally and by Radiologist.  XR CHEST PORTABLE   Final Result   No active pulmonary process, status post removal of PICC line catheter.       CT ABDOMEN PELVIS W IV CONTRAST Additional Contrast? None   Final Result   Findings most compatible with nonocclusive PE in the distal right main,   lobar, and segmental pulmonary arteries. Critical results were called by Dr. Rolf Bunn to [facility] on 12/2/2020 at 17:34. Bilateral lower lobe interstitial and reticular infiltrates may be edema   and/or pneumonitis, new from 11/04/2020. They appear unchanged from   11/24/2020 and may be secondary to COVID pneumonia. Dr. COSME [facility] reports patient   positive for COVID on 11/24/2020. Findings again most compatible with hepatic steatosis   Coronary artery calcification   Prior cholecystectomy with postoperative reservoir effect in the common bile   duct   Extensive plaque and noncalcified plaque in abdominal aorta and branches may   be associated with stenosis   Stable IVC filter   Stable large anterior abdominal wall rim calcified cystic lesion. Considerations again include chronic seroma and/or old hematoma          EKG: This EKG is signed and interpreted by the EP. Sinus tachycardia w/ PACs, vent rate 108bpm, normal axis and intervals, inferior and lateral ST-T changes similar to prior EKG      ------------------------- NURSING NOTES AND VITALS REVIEWED ---------------------------   The nursing notes within the ED encounter and vital signs as below have been reviewed by myself. BP (!) 72/48   Pulse 93   Temp 97.6 °F (36.4 °C) (Temporal)   Resp 16   Wt 192 lb (87.1 kg)   SpO2 96%   BMI 31.95 kg/m²   Oxygen Saturation Interpretation: Normal    The patients available past medical records and past encounters were reviewed.         ------------------------------ ED COURSE/MEDICAL DECISION MAKING----------------------  Medications   sodium chloride flush 0.9 % injection 10 mL (has no administration in time range)   fluconazole (DIFLUCAN) tablet 200 mg (has no administration in time range)   heparin (porcine) injection 6,970 Units (has no administration in time range) heparin (porcine) injection 3,480 Units (has no administration in time range)   heparin 25,000 units in dextrose 5% 250 mL infusion (18 Units/kg/hr × 87.1 kg Intravenous New Bag 20)   pantoprazole (PROTONIX) 80 mg in sodium chloride 0.9 % 50 mL bolus (0 mg Intravenous Stopped 20 1639)   lactated ringers infusion 1,000 mL (0 mLs Intravenous Stopped 20 1730)   iopamidol (ISOVUE-370) 76 % injection 90 mL (90 mLs Intravenous Given 20 1652)   heparin (porcine) injection 6,970 Units (6,970 Units Intravenous Given 20 1955)      Critical Care: Please note that the withdrawal or failure to initiate urgent interventions for this patient would likely result in a life threatening deterioration or permanent disability. Accordingly this patient received 35 minutes of critical care time, excluding separately billable procedures. ED Course as of Dec 03 0031   Wed Dec 02, 2020   1659 Labs notable for large lactic acidosis, Hb stable from prior, leukocytosis, anion gap metabolic acidosis, hyperglycemia, no ketonuria, LORETA    [SS]   2145 Discussed with Dr. Liat Umanzor who agrees w/ plan for admission. He recommended discussing with pulmonary and general surgery re: anticoagulation. All agree w/ plan to continue heparin gtt and continue to trend hemoglobin    [SS]      ED Course User Index  [SS] Georgina Sullivan MD     Counseling: The emergency provider has spoken with the patient and discussed todays results, in addition to providing specific details for the plan of care and counseling regarding the diagnosis and prognosis. Questions are answered at this time and they are agreeable with the plan. ED Course/Medical Decision Makin y.o. female here with hypotension in setting of recent admission for GIB and hx of colon ca. Adamantly refusing rectal exam. Hypotensive on arrival improving w/ IV fluids.  Hb stable from prior baseline, downtrending slightly on repeat but stable from recent

## 2020-12-03 PROBLEM — I26.09 ACUTE PULMONARY EMBOLISM WITH ACUTE COR PULMONALE (HCC): Status: ACTIVE | Noted: 2020-01-01

## 2020-12-03 NOTE — PROGRESS NOTES
Patient refused to have any blood draw at this moment. Dr. Kodak Deluna notified and will draw the blood culture during next lab draw.

## 2020-12-03 NOTE — PROGRESS NOTES
Comprehensive Nutrition Assessment    Type and Reason for Visit:  Initial, Wound    Nutrition Recommendations/Plan: Recommend pt continue current CC w fluid restriction w/ addition of peptic ulcer diet. Recommend ONS for wound healing (Ensure HP w/Jean BID). Nutrition Assessment:  Pt w/ pmh COVID+, colon ca, s/p ex-lap R austin & incisional hernia repair w/ mesh, admin for SOB 2/2 PE w/ possible GIB. Pt refusing rectal exam to assess possible bleed. Recs above    Malnutrition Assessment:  Malnutrition Status: At risk for malnutrition (Comment)    Context:  Chronic Illness     Findings of the 6 clinical characteristics of malnutrition:  Energy Intake:  No significant decrease in energy intake  Weight Loss:  Unable to assess(10% wt [id number] confounded by fluid shifts)     Body Fat Loss:  Unable to assess     Muscle Mass Loss:  Unable to assess    Fluid Accumulation:  Unable to assess     Strength:  Not Performed    Estimated Daily Nutrient Needs:  Energy (kcal):  1662; kcal; Weight Used for Energy Requirements:  Current     Protein (g):   g; Weight Used for Protein Requirements:  Ideal(1.5-1.8 g/kg IBW(56.81))        Fluid (ml/day):  Per cardiac; Method Used for Fluid Requirements:         Nutrition Related Findings:  A/Ox4 w/ intermittent confusion, noted bloody diarrhea, hypoactive BS, trace edema, -I/O's      Wounds:  Surgical Incision(abdominal 11/04)       Current Nutrition Therapies:    DIET CARB CONTROL; Daily Fluid Restriction: 1800 ml    Anthropometric Measures:  · Height: 5' 5\" (165.1 cm)  · Current Body Weight: 191 lb 9.6 oz (86.9 kg)(12/3)   · Usual Body Weight: 214 lb (97.1 kg)(11/14/20 BS Per EMR)     · Ideal Body Weight: 125 lbs; % Ideal Body Weight 153.3 %   · BMI: 31.9  · BMI Categories: Obese Class 1 (BMI 30.0-34. 9)       Nutrition Diagnosis:   · Increased nutrient needs related to increase demand for energy/nutrients as evidenced by wounds      Nutrition Interventions:   Food and/or Nutrient Delivery:  Modify Current Diet, Start Oral Nutrition Supplement  Nutrition Education/Counseling:  No recommendation at this time   Coordination of Nutrition Care:  Continue to monitor while inpatient    Goals:  Pt to consume >75% of all meals/ONS       Nutrition Monitoring and Evaluation:   Food/Nutrient Intake Outcomes:  Food and Nutrient Intake, Supplement Intake  Physical Signs/Symptoms Outcomes:  Biochemical Data, Nutrition Focused Physical Findings, Skin, Weight, GI Status, Fluid Status or Edema, Hemodynamic Status     Discharge Planning:     Too soon to determine     Electronically signed by Fransisco Feng RD, JEFFRY on 12/3/20 at 9:39 AM EST    Contact: [phone]

## 2020-12-03 NOTE — CONSULTS
Pulmonary [facility]                             Pulmonary Consult/Progress Note :          Patient: Judson Bhat  MRN: [id number]  : 1950      Date of Admission: .2020  2:53 PM    Consulting Physician:Pulmonary embolism         Reason for Consultation:SOB ,abdomen pain   CC :   HPI:   Judson Bhat is a 69y.o. year old with 10 PPY smoking history , She was brought in to the ED from a nursing facility for evaluation of SOB and possible GI bleed. So she underwent CT abdopmen , a CT was done that was positive for PE in the right main, lobar, and segmental pulmonary arteries. She was started on a heparin gtt. Vascular surgery is consulted for evaluation and treatment of PE. The patient has history of DVT in the lower extremity over 10 years ago. She has an IVC filter that she believes was inserted during that time. The patient was recently admitted to the hospital with coffee ground emesis. She underwent seroma aspiration  that she had developed following hemicolectomy in  for colon ca. We asked  general surgery to see  this admission and recommend monitoring on heparin. She denies any recent bloody stools or emesis. While interviewing her ,she  denies shortness of breath or chest pain.  She is feeling much better than when she first presented to the hospital.  She denies any pain associated with thi  She was tested + for COVID    PAST MEDICAL HISTORY:   Past Medical History:   Diagnosis Date    Arthritis     Asthma     no recent attacks past 18 months    Colon cancer ([address] 2006    colon CA    Diabetes mellitus ([address]     DVT (deep venous thrombosis) ([address] 2006    Hernia of abdominal cavity     Hypertension     pt states \"pretty good\"; on no meds    Other disorders of kidney and ureter     kidney stone    PE (pulmonary embolism) 2006       PAST SURGICAL HISTORY:   Past Surgical History:   Procedure Laterality Date    ABDOMINAL HERNIA REPAIR  8/5/13    CHOLECYSTECTOMY      COLON SURGERY      partial resection    INSERT PICC LINE  11/10/2020         OVARY REMOVAL      bilateral       FAMILY HISTORY:   History reviewed. No pertinent family history. SOCIAL HISTORY:   Social History     Socioeconomic History    Marital status:      Spouse name: Not on file    Number of children: Not on file    Years of education: Not on file    Highest education level: Not on file   Occupational History    Not on file   Social Needs    Financial resource strain: Not on file    Food insecurity     Worry: Not on file     Inability: Not on file    Transportation needs     Medical: Not on file     Non-medical: Not on file   Tobacco Use    Smoking status: Never Smoker    Smokeless tobacco: Never Used   Substance and Sexual Activity    Alcohol use: No    Drug use: No    Sexual activity: Never   Lifestyle    Physical activity     Days per week: Not on file     Minutes per session: Not on file    Stress: Not on file   Relationships    Social connections     Talks on phone: Not on file     Gets together: Not on file     Attends Sikh service: Not on file     Active member of club or organization: Not on file     Attends meetings of clubs or organizations: Not on file     Relationship status: Not on file    Intimate partner violence     Fear of current or ex partner: Not on file     Emotionally abused: Not on file     Physically abused: Not on file     Forced sexual activity: Not on file   Other Topics Concern    Not on file   Social History Narrative    Not on file     Social History     Tobacco Use   Smoking Status Never Smoker   Smokeless Tobacco Never Used     Social History     Substance and Sexual Activity   Alcohol Use No     Social History     Substance and Sexual Activity   Drug Use No       OCCUPATIONAL HISTORY:            HOME MEDICATIONS:  Prior to Admission medications    Medication Sig Start Date End Date Taking? Authorizing Provider   gabapentin (NEURONTIN) 400 MG capsule Take 400 mg by mouth 3 times daily. Yes Historical Provider, MD   guaiFENesin (ROBITUSSIN) 100 MG/5ML SOLN oral solution Take 200 mg by mouth every 4 hours as needed for Cough   Yes Historical Provider, MD   magnesium hydroxide (MILK OF MAGNESIA) 400 MG/5ML suspension Take by mouth daily as needed for Constipation   Yes Historical Provider, MD   acetaminophen (TYLENOL) 325 MG tablet Take 650 mg by mouth every 6 hours as needed for Pain   Yes Historical Provider, MD   furosemide (LASIX) 20 MG tablet Take 20 mg by mouth daily   Yes Historical Provider, MD   glyBURIDE-metFORMIN (GLUCOVANCE) 5-500 MG per tablet Take 2 tablets by mouth 2 times daily   Yes Historical Provider, MD   acetaminophen-codeine (TYLENOL #3) 300-30 MG per tablet Take 1 tablet by mouth every 8 hours as needed for Pain.    Yes Historical Provider, MD   albuterol sulfate  (90 Base) MCG/ACT inhaler Inhale 2 puffs into the lungs every 6 hours as needed for Shortness of Breath 11/10/20  Yes Galileo Joshi, DO   Insulin Aspart Prot & Aspart (NOVOLOG MIX 70/30 SC) Inject 80 Units into the skin 2 times daily (before meals) Follows sliding scale   Yes Historical Provider, MD   ondansetron (ZOFRAN) 4 MG tablet Take 4 mg by mouth every 6 hours as needed for Nausea or Vomiting  12/3/20 Yes Historical Provider, MD       CURRENT MEDICATIONS:  Current Facility-Administered Medications: mineral oil-hydrophilic petrolatum (HYDROPHOR) ointment, , Topical, BID PRN  mineral oil-hydrophilic petrolatum (HYDROPHOR) ointment, , Topical, Q8H  acetaminophen (TYLENOL) tablet 650 mg, 650 mg, Oral, Q6H PRN  gabapentin (NEURONTIN) capsule 400 mg, 400 mg, Oral, TID  guaiFENesin (ROBITUSSIN) 100 MG/5ML oral solution 200 mg, 200 mg, Oral, Q4H PRN  magnesium hydroxide (MILK OF MAGNESIA) 400 MG/5ML suspension 30 mL, 30 mL, Oral, Daily PRN  ondansetron (ZOFRAN) tablet 4 mg, 4 mg, Oral, Q6H PRN  montelukast (SINGULAIR) tablet 10 mg, 10 mg, Oral, Nightly  insulin lispro protamine & lispro (HUMALOG MIX) (75-25) 100 UNIT per ML injection vial SUSP 80 Units, 80 Units, Subcutaneous, BID WC  glucose (GLUTOSE) 40 % oral gel 15 g, 15 g, Oral, PRN  dextrose 50 % IV solution, 12.5 g, Intravenous, PRN  glucagon (rDNA) injection 1 mg, 1 mg, Intramuscular, PRN  dextrose 5 % solution, 100 mL/hr, Intravenous, PRN  insulin glargine (LANTUS) injection vial 22 Units, 0.25 Units/kg, Subcutaneous, Nightly  insulin lispro (HUMALOG) injection vial 0-6 Units, 0-6 Units, Subcutaneous, TID WC  insulin lispro (HUMALOG) injection vial 0-3 Units, 0-3 Units, Subcutaneous, Nightly  heparin (porcine) injection 6,970 Units, 80 Units/kg, Intravenous, PRN  heparin (porcine) injection 3,480 Units, 40 Units/kg, Intravenous, PRN  heparin 25,000 units in dextrose 5% 250 mL infusion, 18 Units/kg/hr, Intravenous, Continuous  pantoprazole (PROTONIX) injection 40 mg, 40 mg, Intravenous, BID **AND** sodium chloride (PF) 0.9 % injection 10 mL, 10 mL, Intravenous, BID    IV MEDICATIONS:   dextrose      heparin (PORCINE) Infusion 16 Units/kg/hr (12/03/20 1330)       ALLERGIES:  Allergies   Allergen Reactions    Fluticasone-Salmeterol      Causes eye blindness in patient       REVIEW OF SYSTEMS:  General ROS:  No weight loss ,no fatigue     ENT ROS:   No Sore throat ,no lymphoadenopathy,no nasal stuffiness     Hematological and Lymphatic ROS:   No ecchymosis ,no tendency to bleed  Respiratory ROS:    SOB   Cardiovascular ROS:   No CP,No Palpitation   Gastrointestinal ROS:   No Gi bleed,no nausea or vomiting      - Musculoskeletal ROS:      - no joint swelling ,no joint pain   Neurological ROS:     -no weakness or numbness    Dermatological ROS:   No skin rash ,no urticaria     PHYSICAL EXAMINATION:     VITAL SIGNS:  BP (!) 121/58   Pulse 88   Temp 97.6 °F (36.4 °C) (Temporal)   Resp 16   Ht 5' 5\" (1.651 m)   Wt 191 lb 9.6 oz (86.9 kg)   SpO2 94%   BMI 12/02/2020    NEUTOPHILPCT 87.8 (H) 12/02/2020    MONOPCT 2.6 12/02/2020    BASOPCT 0.6 12/02/2020    NEUTROABS 12.82 (H) 12/02/2020    LYMPHSABS 0.86 (L) 12/02/2020    MONOSABS 0.43 12/02/2020    EOSABS 0.13 12/02/2020    BASOSABS 0.00 12/02/2020       Recent Labs     12/02/20  1946 12/02/20  1508 11/28/20  0302   WBC 11.7* 14.4* 7.9   HGB 8.3* 9.5* 8.6*   HCT 26.7* 32.9* 28.1*   MCV 88.7 93.5 89.2    276 183       BMP:   Recent Labs     12/02/20  1508      K 4.8   CL 99   CO2 17*   BUN 15   CREATININE 1.2*       MG:   Lab Results   Component Value Date    MG 1.7 12/02/2020     Ca/Phos:   Lab Results   Component Value Date    CALCIUM 9.0 12/02/2020    PHOS 2.8 11/28/2020     Amylase:   Lab Results   Component Value Date    AMYLASE 43 12/31/2011     Lipase:   Lab Results   Component Value Date    LIPASE 22 11/24/2020     LIVER PROFILE:   Recent Labs     12/02/20  1508   AST 66*   ALT 12   BILIDIR <0.2   BILITOT 0.5   ALKPHOS 98       PT/INR:   Recent Labs     12/02/20  1508   PROTIME 11.7   INR 1.0     APTT:   Recent Labs     12/02/20  1946 12/03/20  0152 12/03/20  1149   APTT 23.8* 89.4* 61.2*       Cardiac Enzymes:  Lab Results   Component Value Date    TROPONINI 0.02 12/02/2020                 PROBLEM LIST:  Patient Active Problem List   Diagnosis    Hernia, abdominal    Postoperative incisional hernia    UTI (urinary tract infection)    Hypertension    DVT (deep venous thrombosis) (HCC)    Diabetes mellitus ([address]    Colon cancer ([address]    Asthma    Pulmonary embolism (HCC)    Arthritis    Hyponatremia    Abdominal wall seroma    Class 2 obesity in adult    MSSA bacteremia    GI bleed    Hypovolemic shock ([address]    COVID-19    Acute pulmonary embolism without acute cor pulmonale (HCC)    Acute pulmonary embolism with acute cor pulmonale (HCC)               ASSESSMENT:  1.) Right pulmonary embolism   2.)Colon cancer   3.)R/O GI   4.)COVID pneumonia   5.)      PLAN:  ID was consulted defere Remedisivir to them  I will hold on deacdron giving stability and also to decrease risk of GI bleed   Patient with IVC filter  Colon cancer   Has PE in CT right distal main artery ,  Plan as discussed with ER anticoagulation until vascular assess  Already has IVC   GS to see and see the etiology for GI bleed and if she can be oin anticoagulation   IF GS agree and believe anticoagulation can be given I think there is no indication for further intervention and just continue with anticoagulation ,otherwise vascular to assess for EKOS          Thank you very much for allowing me to participate in the care of this pleasant patient , should you have any questions ,please do not hesitate to contact me      Nely Berger MD,[facility]  Pulmonary&Critical Care Medicine   Director of [address] [place] [address] Director of [address] [place] [address]    Bill Franco    NOTE: This report was transcribed using voice recognition software. Every effort was made to ensure accuracy; however, inadvertent computerized transcription errors may be present.

## 2020-12-03 NOTE — H&P
bacteria. CBC shows a  white count of 11.7, H and H of 8.3 and 26.7. Portable chest x-ray  showed no acute pulmonary process, status post removal of PICC line  catheter. CT scan of abdomen and pelvis. Findings most compatible with  nonocclusive PE in the distal right main lobar and segmental pulmonary  arteries. The patient was noted to be positive for COVID on 11/24/2020. EKG shows sinus tach with PACs, no acute ST- or T-wave abnormalities  noted. The patient did receive some IV fluids, started on a heparin  drip per ER doctor. We will obtain a consult with General Surgery,  Vascular Surgery, and Pulmonary, and the patient will be admitted under  my service to telemetry floor. The patient offers no other complaints  at this time. PAST MEDICAL HISTORY:  1. History of DVT/pulmonary embolism. 2.  History of a colon CA. 3.  Hypertension. 4.  Hyperlipidemia. 5.  Type 2 diabetes, uncontrolled. 6.  Chronic abdominal seroma. 7.  Asthma. 8.  Diffuse arthritis. 9.  History of pulmonary embolism in 2006. PAST SURGICAL HISTORY:  1. She had an ovarian removal bilaterally. 2.  PICC line insertion in 11/2020.  3.  Colon surgery, partial resection. 4.  Cholecystectomy. 5.  Abdominal hernia repair in 08/2013. ALLERGIES:  _____/SALMETEROL causes blindness with the patient. MEDICATIONS. Prior to admission, the patient was on:  1. Neurontin 400 mg one by mouth three times a day. 2.  Robitussin 200 mg every four to six hours as needed. 3.  Milk of Magnesia 30 mL daily as needed. 4.  Tylenol 650 mg one every six hours as needed. 5.  Lasix 20 mg daily. 6.  Glucovance 5/500 mg two tablets by mouth twice a day. 7.  Tylenol No. 3 one tablet every eight hours as needed. 8.  Albuterol two puffs every four to six hours as needed for shortness  of breath. 9.  NovoLog Mix 70/30, 80 units subcutaneous twice a day. SOCIAL HISTORY:  Nondrinker, nonsmoker. No history of illicit drug use.   The patient, to my knowledge, does not vape. Occupation is retired. She is . FAMILY HISTORY:  Unknown. REVIEW OF SYSTEMS:  As mentioned in chief complaint, otherwise  unremarkable. PHYSICAL EXAMINATION:  VITALS:  On admission, temperature 97.6, pulse 93, respirations 16,  blood pressure initially was 72/48, pulse oximetry 96% on room air. Height 5 feet 5 inches, weight 191 pounds, BMI is 31.88. GENERAL:  Alert and oriented x3, appears to be in no acute distress. SKIN:  Adequate turgor. No tenting. No rash or wounds. HEENT:  Unremarkable. HEART:  Regular rate and rhythm without murmurs, rubs or gallops. LUNGS:  Decreased breath sounds in the bilateral bases without rales or  rhonchi upon auscultation. ABDOMEN:  Obese, soft, nontender, nondistended. Good bowel sounds  throughout. No masses, no organomegaly, no bruit. EXTREMITIES:  No clubbing, cyanosis or edema noted. NEUROLOGICAL:  Intact. No focal deficits. Cranial nerves II to XII  grossly intact. MUSCULOSKELETAL:  Appropriate for above-stated age. LABORATORY DATA:  Metabolic panel unremarkable except for CO2 of 17,  creatinine 1.2, magnesium 1.7, lactic acid 3.1. Her procalcitonin level  was 0.35. ProBNP was 155. Albumin 2.9, AST 66. CBC:  White count  11.7, platelets 284, H and H of 8.3 and 26.7. PTT was 23.8, INR was 1  with a protime of 11.7. Urinalysis shows 30 protein, 25 glucose, 10 to  20 wbc's, bacteria is rare. CT scan of abdomen and pelvis as mentioned  in chief complaint, and portable chest x-ray as noted. IMPRESSION:  1. Right-sided pulmonary embolism, recurrent. 2.  Hypotension, possible recurrent GI bleed. 3.  Acute blood loss anemia. 4.  Obesity. 5.  Hypertension. 6.  Type 2 diabetes, uncontrolled. 7.  Recent history of MSSA bacteremia. 8.  Chronic abdominal wall seroma. 9.  Asthma. 10.  History of colon CA with hemicolectomy.     PLAN:  The patient will be admitted under my service to telemetry floor  with consults to Vascular and General Surgery as well as Pulmonary. May  need to get an ID consult regarding the elevated procalcitonin level. We will await further recommendations per consultants and treat  accordingly. DISPOSITION:  Back to skilled nursing facility when medically stable.         Shira Lopez DO    D: 12/03/2020 10:15:15       T: 12/03/2020 10:23:18     [email or web address]  Job#: [id number]     Doc#: [id number]    CC:

## 2020-12-03 NOTE — CONSULTS
Hernia of abdominal cavity     Hypertension     pt states \"pretty good\"; on no meds    Other disorders of kidney and ureter     kidney stone    PE (pulmonary embolism) 2006        Past Surgical History:   Procedure Laterality Date    ABDOMINAL HERNIA REPAIR  8/5/13    CHOLECYSTECTOMY      COLON SURGERY      partial resection    INSERT PICC LINE  11/10/2020         OVARY REMOVAL      bilateral     Current Medications:    dextrose      heparin (PORCINE) Infusion 16 Units/kg/hr (12/03/20 0242)      mineral oil-hydrophilic petrolatum, acetaminophen, guaiFENesin, magnesium hydroxide, ondansetron, glucose, dextrose, glucagon (rDNA), dextrose, heparin (porcine), heparin (porcine)    mineral oil-hydrophilic petrolatum   Topical Q8H    gabapentin  400 mg Oral TID    montelukast  10 mg Oral Nightly    insulin lispro prot & lispro  80 Units Subcutaneous BID WC    insulin glargine  0.25 Units/kg Subcutaneous Nightly    insulin lispro  0-6 Units Subcutaneous 4x Daily AC & HS    insulin lispro  0-6 Units Subcutaneous TID WC    insulin lispro  0-3 Units Subcutaneous Nightly    fluconazole  200 mg Oral Once    pantoprazole  40 mg Intravenous BID    And    sodium chloride (PF)  10 mL Intravenous BID        Allergies:  Fluticasone-salmeterol    Social History     Socioeconomic History    Marital status:      Spouse name: Not on file    Number of children: Not on file    Years of education: Not on file    Highest education level: Not on file   Occupational History    Not on file   Social Needs    Financial resource strain: Not on file    Food insecurity     Worry: Not on file     Inability: Not on file   [employer] needs     Medical: Not on file     Non-medical: Not on file   Tobacco Use    Smoking status: Never Smoker    Smokeless tobacco: Never Used   Substance and Sexual Activity    Alcohol use: No    Drug use: No    Sexual activity: Never   Lifestyle    Physical activity     Days per week: Not on file     Minutes per session: Not on file    Stress: Not on file   Relationships    Social connections     Talks on phone: Not on file     Gets together: Not on file     Attends Mandaeism service: Not on file     Active member of club or organization: Not on file     Attends meetings of clubs or organizations: Not on file     Relationship status: Not on file    Intimate partner violence     Fear of current or ex partner: Not on file     Emotionally abused: Not on file     Physically abused: Not on file     Forced sexual activity: Not on file   Other Topics Concern    Not on file   Social History Narrative    Not on file        History reviewed. No pertinent family history.     PHYSICAL EXAM:    BP (!) 147/60   Pulse 85   Temp 99.2 °F (37.3 °C) (Temporal)   Resp 19   Ht 5' 5\" (1.651 m)   Wt 191 lb 9.6 oz (86.9 kg)   SpO2 98%   BMI 31.88 kg/m²   CONSTITUTIONAL:  awake, alert, cooperative, no apparent distress, and appears stated age  EYES:  lids and lashes normal, sclera clear and conjunctiva normal  ENT:  normocepalic, without obvious abnormality, external ears without lesions  NECK:  supple, symmetrical, trachea midline  LUNGS:  no increased work of breathing, good air exchange  CARDIOVASCULAR:  regular rate and rhythm  ABDOMEN:  soft, non-distended, non-tender, Aorta is not palpable  SKIN:  no bruising or bleeding  EXTREMITIES:   R UE Swelling absent    5/5 Strength  L UE Swelling absent    5/5 Strength  R LE Edema present    Wounds absent   4/5 Strength  L LE Edema present    Wounds absent   4/5 Strength    R radial 2 L radial 2   R dorsalis pedis 1 L dorsalis pedis 1     LABS:    Lab Results   Component Value Date    WBC 11.7 (H) 12/02/2020    HGB 8.3 (L) 12/02/2020    HCT 26.7 (L) 12/02/2020     12/02/2020    PROTIME 11.7 12/02/2020    INR 1.0 12/02/2020    K 4.8 12/02/2020    BUN 15 12/02/2020    CREATININE 1.2 (H) 12/02/2020       RADIOLOGY:    Assesment/Plan Pulmonary Embolism  · Patient tolerating heparin gtt  · Recommend continued AC per primary   · If unable to tolerate AC, pt already has IVC filter in place  · Would not recommend PE lysis if unable to tolerate systemic anticoagulation  · ECHO was not done  · Pro BNP is 155  · Troponin is 0.02  · Pt is not tachycardic - HR currently 85  · Pt is not hypotensive - SBP currently 147  · I feel that patient is not a good candidate for placement of pulmonary embolism TPA lysis catheters   · discussed with patient pathophysiology of DVT, PE   · All ?s answered  · Ok for discharge from vascular standpoint     Plan reviewed with Dr. Augusta Mcclure.      Cindy Tsai, CNP    No plan for vascular intervention  Disc with Dr. Gabriel Fitting  IVC filter already in place  Anticoagulation per primary    [facility]

## 2020-12-03 NOTE — CONSULTS
GENERAL SURGERY  CONSULT NOTE  12/2/2020    Physician Consulted: Dr. Giovanna Saldana  Reason for Consult: Possible GI Bleed  Referring Physician: Dr. Devang Bunn is a 69 y.o. female COVID+ 11/17 with history of colon cancer s/p ex-lap right hemicolectomy and incisional hernia repair with mesh 2013 with seroma formation s/p aspiration 11/6 who presents for evaluation of SOB and possible GI bleed. She was sent here from her nursing facility and is a poor historian. Most of the information is taken from chart review. She was recently admitted at [facility] ([facility]) for coffee-ground emesis and black tarry stools, her Hgb was stable and she was discharged. She is brought in today from her nursing facility for SOB and was found to have a PE. She was started on a heparin gtt and her Hgb was found to be 9.5 then repeated to 8.3 after IV fluids. She denies any abdominal pain, GERD, overuse of NSAIDs, smoking, alcohol abuse, fever, or chills. She has never had an EGD and her last colonoscopy was September 2019 which revealed polyps. Her only other abdominal surgery includes a cholecystectomy. She reports taking anticoagulation medication but is unsure of which one. According to the chart, she is on ASA 81mg daily.       Past Medical History:   Diagnosis Date    Arthritis     Asthma     no recent attacks past 18 months    Colon cancer ([address] 2006    colon CA    Diabetes mellitus ([address]     DVT (deep venous thrombosis) ([facility] 2006    Hernia of abdominal cavity     Hypertension     pt states \"pretty good\"; on no meds    Other disorders of kidney and ureter     kidney stone    PE (pulmonary embolism) 2006       Past Surgical History:   Procedure Laterality Date    ABDOMINAL HERNIA REPAIR  8/5/13    CHOLECYSTECTOMY      COLON SURGERY      partial resection    INSERT PICC LINE  11/10/2020         OVARY REMOVAL      bilateral       Medications Prior to Admission:    Prior to Admission medications    Medication Sig Start Date End Date Taking? Authorizing Provider   gabapentin (NEURONTIN) 800 MG tablet Take 800 mg by mouth 3 times daily. Yes Historical Provider, MD   furosemide (LASIX) 20 MG tablet Take 20 mg by mouth daily   Yes Historical Provider, MD   glyBURIDE-metFORMIN (GLUCOVANCE) 5-500 MG per tablet Take 2 tablets by mouth 2 times daily   Yes Historical Provider, MD   acetaminophen-codeine (TYLENOL #3) 300-30 MG per tablet Take 1 tablet by mouth every 8 hours as needed for Pain. Yes Historical Provider, MD   Insulin Aspart Prot & Aspart (NOVOLOG MIX 70/30 SC) Inject 80 Units into the skin 2 times daily (before meals) Follows sliding scale   Yes Historical Provider, MD   albuterol sulfate  (90 Base) MCG/ACT inhaler Inhale 2 puffs into the lungs every 6 hours as needed for Shortness of Breath 11/10/20   Lucy Joshi, DO       Allergies   Allergen Reactions    Fluticasone-Salmeterol      Causes eye blindness in patient       History reviewed. No pertinent family history. Social History     Tobacco Use    Smoking status: Never Smoker    Smokeless tobacco: Never Used   Substance Use Topics    Alcohol use: No    Drug use: No         Review of Systems   General ROS: negative  Hematological and Lymphatic ROS: negative  Respiratory ROS: As above  Cardiovascular ROS: negative  Gastrointestinal ROS: As above  Genito-Urinary ROS: negative  Musculoskeletal ROS: negative      PHYSICAL EXAM:    Vitals:    12/02/20 2107   BP: 115/64   Pulse: 90   Resp: 19   Temp:    SpO2: 98%       General Appearance:  awake, alert, in no acute distress  Skin:  Skin color, texture, turgor normal. No rashes or lesions. Head/face:  NCAT  Eyes:  No gross abnormalities. Lungs: On 3L NC  Heart:  RR and normotensive  Abdomen:  Soft, non-tender, non-distended. Chronic wound on mid-abdomen  Extremities: Extremities warm to touch  Female Rectal: Rectal exam refused by patient.     LABS:    CBC  Recent Labs     12/02/20 1946   WBC 11.7*   HGB 8.3*   HCT 26.7*        BMP  Recent Labs     12/02/20  1508      K 4.8   CL 99   CO2 17*   BUN 15   CREATININE 1.2*   CALCIUM 9.0     Liver Function  Recent Labs     12/02/20  1508   BILITOT 0.5   BILIDIR <0.2   AST 66*   ALT 12   ALKPHOS 98   PROT 7.1   LABALBU 2.9*     No results for input(s): LACTATE in the last 72 hours. Recent Labs     12/02/20  1508   INR 1.0       RADIOLOGY    Ct Abdomen Pelvis W Iv Contrast Additional Contrast? None    Result Date: 12/2/2020  EXAMINATION: CT OF THE ABDOMEN AND PELVIS WITH CONTRAST 12/2/2020 4:51 pm TECHNIQUE: CT of the abdomen and pelvis was performed with the administration of intravenous contrast. Multiplanar reformatted images are provided for review. Dose modulation, iterative reconstruction, and/or weight based adjustment of the mA/kV was utilized to reduce the radiation dose to as low as reasonably achievable. COMPARISON: 11/24/2020 and 11/04/2020 HISTORY: ORDERING SYSTEM PROVIDED HISTORY: hypotension, epigastric and periumbilical pain TECHNOLOGIST PROVIDED HISTORY: Reason for exam:->hypotension, epigastric and periumbilical pain Additional Contrast?->None FINDINGS: Coronary artery calcification. Normal cardiac size without pericardial effusion. Stable linear and reticular densities in both lower lobes posteriorly. This may reflect persistent scarring, interstitial edema, and/or interstitial pneumonitis. No pleural effusion. There is a nonocclusive filling defect in the distal aspect of the right main pulmonary artery extending into the right lower lobe lobar pulmonary artery and proximal segmental branches. No definite saddle embolus configuration. This most compatible with nonocclusive PE. No CT evidence of right heart strain. Degenerative change in the regional skeleton. No acute fracture or vertebral compression. Nonspecific diffusely decreased density of the liver parenchyma may reflect fatty infiltration. No visualized focal liver lesion.  Again noted is prior cholecystectomy with postoperative reservoir effect in the common bile duct. Normal-appearing adrenals, pancreas, and spleen. Intact normal caliber abdominal aorta with moderate to severe calcified and noncalcified atherosclerotic plaque. Plaque extends into the common iliac arteries bilaterally. There may be stenosis. There is also plaque at the origin of both renal arteries, possibly with stenosis. Normal caliber IVC again containing a filter. Stable simple appearing cyst in right kidney. No renal calculus or hydronephrosis. No ureteral calculus or distention. Stable large nonspecific cystic lesion in the lower midline abdominal wall subcutaneous tissues with rim calcification. Considerations again include a chronic seroma and/or old hematoma. Normal-appearing distal esophagus, stomach, and duodenum. No small bowel distention in the abdomen and pelvis to suggest intestinal obstruction. No pelvic cul-de-sac free fluid. Urinary bladder decompressed containing a Smith catheter. Normal-appearing uterus and adnexa. No acute rectal abnormality. Rectosigmoid anastomosis appears intact. Normal-appearing remnant sigmoid colon. No gross ascites, free air, or colonic distention. Normal-appearing cecum, terminal ileum, and appendix. Findings most compatible with nonocclusive PE in the distal right main, lobar, and segmental pulmonary arteries. Critical results were called by Dr. Dayna Bhakta to [facility] on 12/2/2020 at 17:34. Bilateral lower lobe interstitial and reticular infiltrates may be edema and/or pneumonitis, new from 11/04/2020. They appear unchanged from 11/24/2020 and may be secondary to COVID pneumonia. Dr. COSME [facility] reports patient positive for COVID on 11/24/2020.  Findings again most compatible with hepatic steatosis Coronary artery calcification Prior cholecystectomy with postoperative reservoir effect in the common bile duct Extensive plaque and noncalcified plaque in abdominal aorta and branches may be associated with stenosis Stable IVC filter Stable large anterior abdominal wall rim calcified cystic lesion. Considerations again include chronic seroma and/or old hematoma    Xr Chest Portable    Result Date: 12/2/2020  EXAMINATION: ONE XRAY VIEW OF THE CHEST 12/2/2020 6:01 pm COMPARISON: November 24, 2020 HISTORY: ORDERING SYSTEM PROVIDED HISTORY: short of breath TECHNOLOGIST PROVIDED HISTORY: Reason for exam:->short of breath What reading provider will be dictating this exam?->CRC FINDINGS: The PICC line catheter has been removed. There are mild increased markings at the lung bases. No airspace consolidation. The heart is enlarged. The costophrenic angles are clear. No pneumothorax. No active pulmonary process, status post removal of PICC line catheter. ASSESSMENT:  69 y.o. female with PE on heparin gtt, Hgb seems to be stable from her previous admissions    PLAN:  - Admit to medicine  - Pt refusing rectal exam so cannot definitively say she does not have a GI bleed  - H/H seems to be stable from her previous admissions  - Discussed the risks and benefits of not being able to assess for GI bleed and starting a heparin gtt.  She states that she understands and \"wants her PE taken care of so she can go back to her facility\"  - At this point, start the heparin gtt for her PE and continue to monitor closely for signs and symptoms of GI bleed  - PPI BID  - Monitor H/H, transfuse per primary  - Overall care per primary  - Discussed with Dr. Russ Newman    Electronically signed by Eula Samson MD on 12/2/20 at 9:45 PM EST

## 2020-12-03 NOTE — CARE COORDINATION
Pt admitted from [facility] with a PE. Pt is COVID+ 11/24. Spoke with Esthela([place]) pt is skilled, not a bed hold, and no precert needed to return (they will take her back). Called spouse Brooks Garces, went right to voice mail, left message for return call. Envelope and ambulance form in soft chart. 10:45am received call from spouse, the plan is to return to [facility] at discharge. Jerrol Spurling LSW

## 2020-12-03 NOTE — FLOWSHEET NOTE
12/03/20 1529   Encounter Summary   Services provided to: Family  ( Trae Annabella)   Referral/Consult From:   (by phone)   Support System Family members   Continue Visiting   (12/3 DL)   Complexity of Encounter Moderate   Spiritual Assessment Completed Yes   Routine   Type Initial   Spiritual/Yarsanism   Type Spiritual support   Assessment Calm; Approachable;Coping   Intervention Explored feelings, thoughts, concerns; Active listening;Prayer;Discussed belief system/Denominational practices/dannielle;Discussed illness/injury and it's impact   Outcome Expressed gratitude;Engaged in conversation;Expressed feelings/needs/concerns;Comfort

## 2020-12-03 NOTE — PROGRESS NOTES
Patient with IVC filter  Recent COVID  Colon cancer   Came with SOB   Has PE in CT right distal main artery ,  Plan as discussed with ER anticoagulation until vascular assess  Already has IVC   GS to see and see the etiology for GI bleed and if she can be oin anticoagulation   IF GS agree and believe anticoagulation can be given <no indication for further intervention and just continue with anticoagulation ,otherwise vascular to assess for EKOS

## 2020-12-03 NOTE — PROGRESS NOTES
Vascular surgery consult placed via perfect serve to Dr William Hood.   Dr Kaia Valdez taking new consults

## 2020-12-04 PROBLEM — Z95.828 PRESENCE OF IVC FILTER: Status: ACTIVE | Noted: 2020-01-01

## 2020-12-04 NOTE — PROGRESS NOTES
General Progress Note  12/4/2020 7:38 AM  Subjective:   Admit Date: 12/2/2020  PCP: Licha Joshi DO  Interval History: patient resting comfortably in bed. No acute issues overnight. Appreciate Vascular, ID, and Pulmonary input on case. H/H is stable currently. Diet: DIET PEPTIC ULCER; Carb Control: 5 carb choices (75 gms)/meal; Daily Fluid Restriction: 1800 ml  Dietary Nutrition Supplements: Low Calorie High Protein Supplement  Dietary Nutrition Supplements: Wound Healing Oral Supplement  Pain is:None  Nausea:None  Bowel Movement/Flatus yes    Data:   Scheduled Meds:   mineral oil-hydrophilic petrolatum   Topical Q8H    gabapentin  400 mg Oral TID    montelukast  10 mg Oral Nightly    insulin lispro prot & lispro  80 Units Subcutaneous BID WC    insulin glargine  0.25 Units/kg Subcutaneous Nightly    insulin lispro  0-6 Units Subcutaneous TID WC    insulin lispro  0-3 Units Subcutaneous Nightly    pantoprazole  40 mg Intravenous BID    And    sodium chloride (PF)  10 mL Intravenous BID     Continuous Infusions:   dextrose      heparin (PORCINE) Infusion 16 Units/kg/hr (12/03/20 1330)     PRN Meds:mineral oil-hydrophilic petrolatum, acetaminophen, guaiFENesin, magnesium hydroxide, ondansetron, glucose, dextrose, glucagon (rDNA), dextrose, heparin (porcine), heparin (porcine)  I/O last 3 completed shifts: In: 1200 [P.O.:1200]  Out: 450 [Urine:450]  No intake/output data recorded.     Intake/Output Summary (Last 24 hours) at 12/4/2020 0738  Last data filed at 12/3/2020 2336  Gross per 24 hour   Intake 1200 ml   Output 450 ml   Net 750 ml       CBC with Differential:    Lab Results   Component Value Date    WBC 10.8 12/04/2020    RBC 3.15 12/04/2020    HGB 8.8 12/04/2020    HCT 28.2 12/04/2020     12/04/2020    MCV 89.5 12/04/2020    MCH 27.9 12/04/2020    MCHC 31.2 12/04/2020    RDW 18.1 12/04/2020    SEGSPCT 86 08/08/2013    BLASTSPCT 0.9 12/02/2020    METASPCT 0.9 12/02/2020    LYMPHOPCT 6.1 12/02/2020    PROMYELOPCT 0.9 12/02/2020    MONOPCT 2.6 12/02/2020    BASOPCT 0.6 12/02/2020    MONOSABS 0.43 12/02/2020    LYMPHSABS 0.86 12/02/2020    EOSABS 0.13 12/02/2020    BASOSABS 0.00 12/02/2020     CMP:    Lab Results   Component Value Date     12/02/2020    K 4.8 12/02/2020    K 3.2 11/24/2020    CL 99 12/02/2020    CO2 17 12/02/2020    BUN 15 12/02/2020    CREATININE 1.2 12/02/2020    GFRAA 54 12/02/2020    LABGLOM 44 12/02/2020    GLUCOSE 325 12/02/2020    GLUCOSE 149 04/18/2012    PROT 7.1 12/02/2020    LABALBU 2.9 12/02/2020    LABALBU 4.0 04/18/2012    CALCIUM 9.0 12/02/2020    BILITOT 0.5 12/02/2020    ALKPHOS 98 12/02/2020    AST 66 12/02/2020    ALT 12 12/02/2020     Magnesium:    Lab Results   Component Value Date    MG 1.7 12/02/2020     Phosphorus:    Lab Results   Component Value Date    PHOS 2.8 11/28/2020     PT/INR:    Lab Results   Component Value Date    PROTIME 11.7 12/02/2020    INR 1.0 12/02/2020     Last 3 Troponin:    Lab Results   Component Value Date    TROPONINI 0.02 12/02/2020    TROPONINI <0.01 11/24/2020    TROPONINI <0.01 11/24/2020     U/A:    Lab Results   Component Value Date    COLORU Yellow 12/02/2020    PHUR 6.0 12/02/2020    WBCUA 10-20 12/02/2020    WBCUA NONE 12/31/2011    RBCUA 0-1 12/02/2020    RBCUA NONE 12/31/2011    YEAST Present 12/02/2020    BACTERIA RARE 12/02/2020    CLARITYU Clear 12/02/2020    SPECGRAV 1.020 12/02/2020    LEUKOCYTESUR Negative 12/02/2020    UROBILINOGEN 0.2 12/02/2020    BILIRUBINUR Negative 12/02/2020    BILIRUBINUR NEGATIVE 12/31/2011    BLOODU Negative 12/02/2020    GLUCOSEU 250 12/02/2020    GLUCOSEU 250 12/31/2011     HgBA1c:  No components found for: HGBA1C  TSH:    Lab Results   Component Value Date    TSH 1.380 06/24/2013     -----------------------------------------------------------------    Objective:   Vitals: /60   Pulse 75   Temp 97.7 °F (36.5 °C) (Temporal)   Resp 18   Ht 5' 5\" (1.651 m)   Wt 191 lb 9.6 oz (86.9 kg)   SpO2 98%   BMI 31.88 kg/m²   General appearance: alert, appears stated age and cooperative  Skin: Skin color, texture, turgor normal. No rashes or lesions  HEENT: Head: Normal, normocephalic, atraumatic. Neck: no adenopathy, no carotid bruit, no JVD, supple, symmetrical, trachea midline and thyroid not enlarged, symmetric, no tenderness/mass/nodules  Lungs: diminished breath sounds bibasilar  Heart: regular rate and rhythm, S1, S2 normal, no murmur, click, rub or gallop  Abdomen: soft, non-tender; bowel sounds normal; no masses,  no organomegaly  Extremities: extremities normal, atraumatic, no cyanosis or edema  Neurologic: Mental status: Alert, oriented, thought content appropriate    Assessment:   Principal Problem:    Acute pulmonary embolism without acute cor pulmonale (HCC)  Active Problems:    Hypertension    Diabetes mellitus (HCC)    Colon cancer (HCC)    Abdominal wall seroma    Class 2 obesity in adult    COVID-19    Presence of IVC filter  Resolved Problems:    * No resolved hospital problems. *    Plan:   1. General surgery to determine if oral anticoagulation is appropriate. 2. Follow labs and treat accordingly. 3. Remains on Heparin drip for now. 4. Will follow.     Eric Gillis D.O.  7:38 AM  12/4/2020

## 2020-12-04 NOTE — PROGRESS NOTES
[facility] PROGRESS NOTE      Chief complaint: Follow-up of elevated procalcitonin     The patient is a 69 y.o. female with history of  DM, hypertension, DVT, colon cancer status post right hemicolectomy and incisional hernia repair with mesh in 4614 complicated by seroma formation status post aspiration on 11/06 during which no gross infection was noted, previously admitted from 97/44-24/73 for complicated MSSA bacteremia of unclear etiology (MAGALYS unable to be done due to patient's refusal) for which she was seen by Dr. Samuel Garcia and was given a 4-week course of cefazolin from 11/05-11/25, then admitted on 11/24 for GI bleeding and mild COVID-19, presented on 12/02 for hypotension, found to have pulmonary embolism in and distal right need, lobar and segmental pulmonary arteries on CT abdomen and pelvis. On admission, she was afebrile and hypotensive with no leukocytosis. Procalcitonin level was elevated at 0.35 ng/mL. Urinalysis showed pyuria of 10-20 WBCs with urine culture growing Candida albicans  Chest x-ray showed clear lungs. Subjective: Patient was seen and examined. No chills, no abdominal pain, no diarrhea, no rash, has itching. Objective:    Vitals:    12/04/20 1015   BP: (!) 106/55   Pulse: 74   Resp: 18   Temp: 97.1 °F (36.2 °C)   SpO2: 98%     Constitutional: Alert, not in distress  Respiratory: Clear breath sounds, no crackles, no wheezes  Cardiovascular: Regular rate and rhythm, no murmurs  Gastrointestinal: Bowel sounds present, soft, nontender  Skin: Warm and dry, no active dermatoses  Musculoskeletal: No joint swelling, no joint erythema    Labs, imaging, and medical records/notes were personally reviewed. Assessment:  Elevated procalcitonin level, significance unclear. There is no apparent source of infection and no apparent signs of sepsis for now. Recent MSSA bacteremia. Finished 4-week of cefazolin 11/05-11/25.   GI bleeding  Pulmonary embolism  COVID-19 (tested positive on

## 2020-12-04 NOTE — PROGRESS NOTES
Edwin Randolph, DO via perfect serve regarding pt complaint of persistent itching.  No new orders at this time

## 2020-12-05 NOTE — PROGRESS NOTES
Messaged Michael campos serve for General surgery to determine if oral anticoagulation is appropriate.  Per general surgery stand point Pt okay to transfer to oral anticoagulants

## 2020-12-05 NOTE — PROGRESS NOTES
General Progress Note  12/5/2020 7:56 AM  Subjective:   Admit Date: 12/2/2020  PCP: Howard Joshi DO  Interval History: awake and alert in no acute distress. Wanting to go home. Remains on Heparin drip. Awaiting surgery clearance to start po anticoagulation. Diet: DIET PEPTIC ULCER; Carb Control: 5 carb choices (75 gms)/meal; Daily Fluid Restriction: 1800 ml  Dietary Nutrition Supplements: Low Calorie High Protein Supplement  Dietary Nutrition Supplements: Wound Healing Oral Supplement  Pain is:None  Nausea:None  Bowel Movement/Flatus yes    Data:   Scheduled Meds:   mineral oil-hydrophilic petrolatum   Topical Q8H    gabapentin  400 mg Oral TID    montelukast  10 mg Oral Nightly    insulin lispro prot & lispro  80 Units Subcutaneous BID WC    insulin glargine  0.25 Units/kg Subcutaneous Nightly    insulin lispro  0-6 Units Subcutaneous TID WC    insulin lispro  0-3 Units Subcutaneous Nightly    pantoprazole  40 mg Intravenous BID    And    sodium chloride (PF)  10 mL Intravenous BID     Continuous Infusions:   dextrose      heparin (PORCINE) Infusion 16 Units/kg/hr (12/05/20 0612)     PRN Meds:diphenhydrAMINE, mineral oil-hydrophilic petrolatum, acetaminophen, guaiFENesin, magnesium hydroxide, ondansetron, glucose, dextrose, glucagon (rDNA), dextrose, heparin (porcine), heparin (porcine)  I/O last 3 completed shifts: In: 1020 [P.O.:960; I.V.:60]  Out: 800 [Urine:800]  No intake/output data recorded.     Intake/Output Summary (Last 24 hours) at 12/5/2020 0756  Last data filed at 12/5/2020 0400  Gross per 24 hour   Intake 1020 ml   Output 800 ml   Net 220 ml       CBC with Differential:    Lab Results   Component Value Date    WBC 10.8 12/04/2020    RBC 3.15 12/04/2020    HGB 8.8 12/04/2020    HCT 28.2 12/04/2020     12/04/2020    MCV 89.5 12/04/2020    MCH 27.9 12/04/2020    MCHC 31.2 12/04/2020    RDW 18.1 12/04/2020    SEGSPCT 86 08/08/2013    BLASTSPCT 0.9 12/02/2020    METASPCT 0.9 12/02/2020    LYMPHOPCT 6.1 12/02/2020    PROMYELOPCT 0.9 12/02/2020    MONOPCT 2.6 12/02/2020    BASOPCT 0.6 12/02/2020    MONOSABS 0.43 12/02/2020    LYMPHSABS 0.86 12/02/2020    EOSABS 0.13 12/02/2020    BASOSABS 0.00 12/02/2020     CMP:    Lab Results   Component Value Date     12/02/2020    K 4.8 12/02/2020    K 3.2 11/24/2020    CL 99 12/02/2020    CO2 17 12/02/2020    BUN 15 12/02/2020    CREATININE 1.2 12/02/2020    GFRAA 54 12/02/2020    LABGLOM 44 12/02/2020    GLUCOSE 325 12/02/2020    GLUCOSE 149 04/18/2012    PROT 7.1 12/02/2020    LABALBU 2.9 12/02/2020    LABALBU 4.0 04/18/2012    CALCIUM 9.0 12/02/2020    BILITOT 0.5 12/02/2020    ALKPHOS 98 12/02/2020    AST 66 12/02/2020    ALT 12 12/02/2020     Magnesium:    Lab Results   Component Value Date    MG 1.7 12/02/2020     Phosphorus:    Lab Results   Component Value Date    PHOS 2.8 11/28/2020     PT/INR:    Lab Results   Component Value Date    PROTIME 11.7 12/02/2020    INR 1.0 12/02/2020     Last 3 Troponin:    Lab Results   Component Value Date    TROPONINI 0.02 12/02/2020    TROPONINI <0.01 11/24/2020    TROPONINI <0.01 11/24/2020     U/A:    Lab Results   Component Value Date    COLORU Yellow 12/02/2020    PHUR 6.0 12/02/2020    WBCUA 10-20 12/02/2020    WBCUA NONE 12/31/2011    RBCUA 0-1 12/02/2020    RBCUA NONE 12/31/2011    YEAST Present 12/02/2020    BACTERIA RARE 12/02/2020    CLARITYU Clear 12/02/2020    SPECGRAV 1.020 12/02/2020    LEUKOCYTESUR Negative 12/02/2020    UROBILINOGEN 0.2 12/02/2020    BILIRUBINUR Negative 12/02/2020    BILIRUBINUR NEGATIVE 12/31/2011    BLOODU Negative 12/02/2020    GLUCOSEU 250 12/02/2020    GLUCOSEU 250 12/31/2011     HgBA1c:  No components found for: HGBA1C  TSH:    Lab Results   Component Value Date    TSH 1.380 06/24/2013     -----------------------------------------------------------------    Objective:   Vitals: /60   Pulse 90   Temp 98.7 °F (37.1 °C) (Temporal)   Resp 18   Ht 5' 5\" (1.651 m)   Wt 191 lb 9.6 oz (86.9 kg)   SpO2 99%   BMI 31.88 kg/m²   General appearance: alert, appears stated age and cooperative  Skin: Skin color, texture, turgor normal. No rashes or lesions  HEENT: Head: Normal, normocephalic, atraumatic. Neck: no adenopathy, no carotid bruit, no JVD, supple, symmetrical, trachea midline and thyroid not enlarged, symmetric, no tenderness/mass/nodules  Lungs: diminished breath sounds bibasilar  Heart: regular rate and rhythm, S1, S2 normal, no murmur, click, rub or gallop  Abdomen: soft, non-tender; bowel sounds normal; no masses,  no organomegaly  Extremities: extremities normal, atraumatic, no cyanosis or edema  Neurologic: Mental status: Alert, oriented, thought content appropriate    Assessment:   Principal Problem:    Acute pulmonary embolism without acute cor pulmonale (HCC)  Active Problems:    Hypertension    Diabetes mellitus (HCC)    Colon cancer (HCC)    Abdominal wall seroma    Class 2 obesity in adult    COVID-19    Presence of IVC filter  Resolved Problems:    * No resolved hospital problems. *    Plan:   1. Continue Heparin drip, until cleared by surgery to start po anticoagulation  2. Will follow labs and treat accordingly. 3. Will follow.     Priscilla Alicea D.O.  7:56 AM  12/5/2020

## 2020-12-05 NOTE — CONSULTS
Inpatient Hematology/Oncology Consult Note     Reason for Visit: Consultation on a patient with PE    Referring Physician: Maryjane Perez MD    PCP:  Priscilla Alicea DO    History of Present Illness:  69 y.o. female with history of  DM, hypertension, DVT, colon cancer s/p surgery 2006 and incisional hernia repair with mesh in 3075 complicated by seroma formation status post aspiration on 11/06 during which no gross infection was noted, previously admitted from 50/18-16/29 for complicated MSSA bacteremia of unclear etiology (MAGALYS unable to be done due to patient's refusal) for which she was seen by Dr. Virginia Knapp and was given a 4-week course of cefazolin from 11/05-11/25, then admitted on 11/24 for GI bleeding and mild COVID-19, presented on 12/02 for hypotension, found to have pulmonary embolism in and distal right need, lobar and segmental pulmonary arteries on CT abdomen and pelvis. On admission, she was afebrile and hypotensive with no leukocytosis. Procalcitonin level was elevated at 0.35 ng/mL. Analysis showed pyuria of 10-20 WBCs. Chest x-ray showed clear lungs    Oncological history:  09/27/2006  1.  RIGHT FALLOPIAN TUBE AND OVARY (A), EXCISION - NO SIGNIFICANT PATHOLOGIC CHANGE. 2.  SIGMOID COLON (B), EXCISION - INVASIVE, MODERATELY DIFFERENTIATED ADENOCARCINOMA. - SEPARATE BENIGN HYPERPLASTIC POLYPS.  - ESSENTIALLY TRANSMURAL ACUTE INFLAMMATION AND PIGMENT DEPOSITION,  CONSISTENT WITH SITE OF PREVIOUS BIOPSY (SEE COMMENT BELOW). 3.  PERICOLONIC LYMPH NODES (B), EXCISION - 42 LYMPH NODES, NEGATIVE FOR TUMOR. 4.  COLON, DISTAL LINE OF RESECTION (C), EXCISION - NO SIGNIFICANT PATHOLOGIC CHANGE. COMMENT  The adenocarcinoma measures approximately 6 cm in greatest dimension and extends just through the muscularis propria to involve serosal fat.  All margins of resection are free of tumor. Review of Systems;  CONSTITUTIONAL: No fever, chills. Fair appetite and energy level.    ENMT: Eyes: No diplopia; Nose: No epistaxis. Mouth: No sore throat. RESPIRATORY: No hemoptysis. + shortness of breath   CARDIOVASCULAR: No chest pain, palpitations. GASTROINTESTINAL: No nausea/vomiting, abdominal pain. GENITOURINARY: No dysuria, urinary frequency, hematuria. NEURO: No syncope, presyncope, headache. Remainder:  ROS NEGATIVE    Past Medical History:      Diagnosis Date    Arthritis     Asthma     no recent attacks past 18 months    [facility] ([facility] 2006    colon CA    [facility]     DVT [facility]deep venous thrombosis) ([facility] 2006    Hernia of abdominal cavity     Hypertension     pt states \"pretty good\"; on no meds    Other disorders of kidney and ureter     kidney stone    PE (pulmonary embolism) 2006     Past Surgical History:      Procedure Laterality Date    ABDOMINAL HERNIA REPAIR  8/5/13    CHOLECYSTECTOMY      COLON SURGERY      partial resection    INSERT PICC LINE  11/10/2020         OVARY REMOVAL      bilateral     Family History:  History reviewed. No pertinent family history. Medications:  Reviewed and reconciled.     Social History:  Social History     Socioeconomic History    Marital status:      Spouse name: Not on file    Number of children: Not on file    Years of education: Not on file    Highest education level: Not on file   Occupational History    Not on file   Social Needs    Financial resource strain: Not on file    Food insecurity     Worry: Not on file     Inability: Not on file    Transportation needs     Medical: Not on file     Non-medical: Not on file   Tobacco Use    Smoking status: Never Smoker    Smokeless tobacco: Never Used   Substance and Sexual Activity    Alcohol use: No    Drug use: No    Sexual activity: Never   Lifestyle    Physical activity     Days per week: Not on file     Minutes per session: Not on file    Stress: Not on file   Relationships    Social connections     Talks on phone: Not on file     Gets together: Not on file     Attends Evangelical service: Not on file     Active member of club or organization: Not on file     Attends meetings of clubs or organizations: Not on file     Relationship status: Not on file    Intimate partner violence     Fear of current or ex partner: Not on file     Emotionally abused: Not on file     Physically abused: Not on file     Forced sexual activity: Not on file   Other Topics Concern    Not on file   Social History Narrative    Not on file     Allergies: Allergies   Allergen Reactions    Fluticasone-Salmeterol      Causes eye blindness in patient     Physical Exam:  BP (!) 125/58   Pulse 81   Temp 97.6 °F (36.4 °C) (Temporal)   Resp 20   Ht 5' 5\" (1.651 m)   Wt 191 lb 9.6 oz (86.9 kg)   SpO2 95%   BMI 31.88 kg/m²   GENERAL: Alert, oriented x 3, not in acute distress. HEENT: PERRLA; EOMI. Oropharynx clear. NECK: Supple. Without lymphadenopathy. LUNGS: Good air entry bilaterally. No wheezing, crackles or ronchi. CARDIOVASCULAR: Regular rate. No murmurs, rubs or gallops. ABDOMEN: Soft. Non-tender, non-distended. Positive bowel sounds. EXTREMITIES: Without clubbing, cyanosis, or edema. NEUROLOGIC: No focal deficits.     Diagnostics:  Lab Results   Component Value Date    WBC 7.4 12/05/2020    HGB 7.2 (L) 12/05/2020    HCT 23.6 (L) 12/05/2020    MCV 89.4 12/05/2020     12/05/2020     Lab Results   Component Value Date     12/05/2020    K 3.6 12/05/2020     12/05/2020    CO2 22 12/05/2020    BUN 31 (H) 12/05/2020    CREATININE 0.9 12/05/2020    GLUCOSE 156 (H) 12/05/2020    CALCIUM 8.6 12/05/2020    PROT 6.0 (L) 12/05/2020    LABALBU 2.4 (L) 12/05/2020    BILITOT 0.3 12/05/2020    ALKPHOS 76 12/05/2020    AST 17 12/05/2020    ALT 6 12/05/2020    LABGLOM >60 12/05/2020    GFRAA >60 12/05/2020     Impression/Plan:  69 y.o. female with history of DM, hypertension, DVT, colon cancer s/p surgery 2006 and incisional hernia repair with mesh in 0670 complicated by seroma formation status post aspiration on 11/06 during which no gross infection was noted, previously admitted from 72/03-82/22 for complicated MSSA bacteremia of unclear etiology (MAGALYS unable to be done due to patient's refusal) for which she was seen by Dr. Danielle Galdamez and was given a 4-week course of cefazolin from 11/05-11/25, then admitted on 11/24 for GI bleeding and mild COVID-19, presented on 12/02 for hypotension, found to have pulmonary embolism in distal right main, lobar and segmental pulmonary arteries on CT abdomen and pelvis. Recommend Lovenox therapeutic given hx of cancer and GI bleeding.    Hx of IVC filter  GS consulted for GI bleed  Vascular team consulted   Will continue to follow    Thank you for allowing us to participate in the care of . Lisa Walker MD   12/5/2020

## 2020-12-05 NOTE — PROGRESS NOTES
[facility] PROGRESS NOTE      Chief complaint: Follow-up of elevated procalcitonin     The patient is a 69 y.o. female with history of  DM, hypertension, DVT, colon cancer status post right hemicolectomy and incisional hernia repair with mesh in 7583 complicated by seroma formation status post aspiration on 11/06 during which no gross infection was noted, previously admitted from 88/04-29/65 for complicated MSSA bacteremia of unclear etiology (MAGALYS unable to be done due to patient's refusal) for which she was seen by Dr. Steven Eldridge and was given a 4-week course of cefazolin from 11/05-11/25, then admitted on 11/24 for GI bleeding and mild COVID-19, presented on 12/02 for hypotension, found to have pulmonary embolism in and distal right need, lobar and segmental pulmonary arteries on CT abdomen and pelvis. On admission, she was afebrile and hypotensive with no leukocytosis. Procalcitonin level was elevated at 0.35 ng/mL. Urinalysis showed pyuria of 10-20 WBCs with urine culture growing Candida albicans. Blood cultures showed gram-positive diplococci in chains. Chest x-ray showed clear lungs. Subjective: Patient was seen and examined. No chills, no abdominal pain, no diarrhea, no rash, has itching. She is wanting to go home. Objective:    Vitals:    12/05/20 0815   BP: (!) 125/58   Pulse: 81   Resp: 20   Temp: 97.6 °F (36.4 °C)   SpO2: 95%     Constitutional: Alert, not in distress  Respiratory: Clear breath sounds, no crackles, no wheezes  Cardiovascular: Regular rate and rhythm, no murmurs  Gastrointestinal: Bowel sounds present, soft, nontender  Skin: Warm and dry, no active dermatoses  Musculoskeletal: No joint swelling, no joint erythema    Labs, imaging, and medical records/notes were personally reviewed. Assessment:  Gram-positive cocci in chains bacteremia  Recent MSSA bacteremia. Finished 4-week of cefazolin 11/05-11/25.   GI bleeding  Pulmonary embolism  COVID-19 (tested positive on 11/24)  Asymptomatic bacteriuria  Candida albicans on urine culture, likely colonization    Recommendations:  Start ceftriaxone 2 g every 24 hours. Repeat blood cultures today. Follow up blood cultures from 12/04. Monitor respiratory status. Wean off oxygen as tolerated. Continue supportive care.     Thank you for involving me in the care of [facility]. I will continue to follow. Please do not hesitate to call for any questions or concerns.     Electronically signed by Mary Bourgeois MD on 12/5/2020 at 9:39 AM

## 2020-12-05 NOTE — PROGRESS NOTES
Pulmonary [address] [facility]                             Pulmonary Consult/Progress Note :        Follow up PE    Reason for Consultation:SOB ,abdomen pain   CC : SOB . abdomen pain   HPI:   Doing much better  No CP  No SOB   No fever or chills    PHYSICAL EXAMINATION:     VITAL SIGNS:  /60   Pulse 90   Temp 98.7 °F (37.1 °C) (Temporal)   Resp 18   Ht 5' 5\" (1.651 m)   Wt 191 lb 9.6 oz (86.9 kg)   SpO2 99%   BMI 31.88 kg/m²   Wt Readings from Last 3 Encounters:   12/03/20 191 lb 9.6 oz (86.9 kg)   11/26/20 192 lb 9.6 oz (87.4 kg)   11/10/20 214 lb (97.1 kg)     Temp Readings from Last 3 Encounters:   12/04/20 98.7 °F (37.1 °C) (Temporal)   11/28/20 97.6 °F (36.4 °C) (Oral)   11/10/20 97.6 °F (36.4 °C) (Oral)     TMAX:  BP Readings from Last 3 Encounters:   12/04/20 120/60   11/28/20 (!) 118/58   11/10/20 (!) 146/63     Pulse Readings from Last 3 Encounters:   12/04/20 90   11/28/20 88   11/10/20 79           INTAKE/OUTPUTS:  I/O last 3 completed shifts: In: 0891 [P.O.:1320;  I.V.:60]  Out: 600 [Urine:600]    Intake/Output Summary (Last 24 hours) at 12/5/2020 0004  Last data filed at 12/4/2020 2200  Gross per 24 hour   Intake 1020 ml   Output 600 ml   Net 420 ml       General Appearance: alert and oriented to person, place and time, well-developed and   well-nourished, in no acute distress   Eyes: pupils equal, round, and reactive to light, extraocular eye movements intact, conjunctivae normal and sclera anicteric   Neck: neck supple and non tender without mass, no thyromegaly, no thyroid nodules and no cervical adenopathy   Pulmonary/Chest:*rhonch   Cardiovascular: normal rate, regular rhythm, normal S1 and S2, no murmurs, rubs, clicks or gallops, distal pulses intact, no carotid bruits, no murmurs, no gallops, no carotid bruits and no JVD   Abdomen: obese, soft, non-tender, non-distended, normal bowel sounds, no masses or organomegaly   Extremities:mild edema Musculoskeletal: normal range of motion, no joint swelling, deformity or tenderness   Neurologic: reflexes normal and symmetric, no cranial nerve deficit noted    LABS/IMAGING:    CBC:  Lab Results   Component Value Date    WBC 10.8 12/04/2020    HGB 8.8 (L) 12/04/2020    HCT 28.2 (L) 12/04/2020    MCV 89.5 12/04/2020     12/04/2020    LYMPHOPCT 6.1 (L) 12/02/2020    RBC 3.15 (L) 12/04/2020    MCH 27.9 12/04/2020    MCHC 31.2 (L) 12/04/2020    RDW 18.1 (H) 12/04/2020    NEUTOPHILPCT 87.8 (H) 12/02/2020    MONOPCT 2.6 12/02/2020    BASOPCT 0.6 12/02/2020    NEUTROABS 12.82 (H) 12/02/2020    LYMPHSABS 0.86 (L) 12/02/2020    MONOSABS 0.43 12/02/2020    EOSABS 0.13 12/02/2020    BASOSABS 0.00 12/02/2020       Recent Labs     12/04/20  0343 12/02/20  1946 12/02/20  1508   WBC 10.8 11.7* 14.4*   HGB 8.8* 8.3* 9.5*   HCT 28.2* 26.7* 32.9*   MCV 89.5 88.7 93.5    159 276       BMP:   Recent Labs     12/02/20  1508      K 4.8   CL 99   CO2 17*   BUN 15   CREATININE 1.2*       MG:   Lab Results   Component Value Date    MG 1.7 12/02/2020     Ca/Phos:   Lab Results   Component Value Date    CALCIUM 9.0 12/02/2020    PHOS 2.8 11/28/2020     Amylase:   Lab Results   Component Value Date    AMYLASE 43 12/31/2011     Lipase:   Lab Results   Component Value Date    LIPASE 22 11/24/2020     LIVER PROFILE:   Recent Labs     12/02/20  1508   AST 66*   ALT 12   BILIDIR <0.2   BILITOT 0.5   ALKPHOS 98       PT/INR:   Recent Labs     12/02/20  1508   PROTIME 11.7   INR 1.0     APTT:   Recent Labs     12/03/20  0152 12/03/20  1149 12/04/20  0343   APTT 89.4* 61.2* 68.1*       Cardiac Enzymes:  Lab Results   Component Value Date    TROPONINI 0.02 12/02/2020                 PROBLEM LIST:  Patient Active Problem List   Diagnosis    Hernia, abdominal    Postoperative incisional hernia    UTI (urinary tract infection)    Hypertension    [facility]    Diabetes mellitus ([facility]    Colon cancer ([address]    Asthma    Pulmonary embolism (HCC)    Arthritis    Hyponatremia    Abdominal wall seroma    Class 2 obesity in adult    MSSA bacteremia    GI bleed    Hypovolemic shock (HCC)    COVID-19    Acute pulmonary embolism without acute cor pulmonale (HCC)    Acute pulmonary embolism with acute cor pulmonale (HCC)    Presence of IVC filter               ASSESSMENT:  1.) Right pulmonary embolism   2.)Colon cancer   3.)R/O GI   4.)COVID pneumonia   5.)      PLAN:  I Think NOAC is not best option giving posisble bleed ,but with hsitory of cancer she Lovenox or NOAC ,.will ask hematology to help with anticoagulation   ID was consulted defere Remedisivir to them  I will hold on deacdron giving stability and also to decrease risk of GI bleed  And she is on RA  Patient with IVC filter  Colon cancer   Has PE in CT right distal main artery ,  Plan as discussed with ER anticoagulation until vascular assess  Already has IVC   GS to see and see the etiology for GI bleed and if she can be oin anticoagulation   IF GS agree and believe anticoagulation can be given I think there is no indication for further intervention and just continue with anticoagulation ,otherwise vascular to assess for EKOS          Thank you very much for allowing me to participate in the care of this pleasant patient , should you have any questions ,please do not hesitate to contact me      Nely Berger MD,[facility]  Pulmonary&Critical Care Medicine   Director of [address] [place] [address] Director of [address] [place] [address]    Miriam Nagel    NOTE: This report was transcribed using voice recognition software. Every effort was made to ensure accuracy; however, inadvertent computerized transcription errors may be present.

## 2020-12-05 NOTE — PROGRESS NOTES
Spoke with Elpidio Sanford DO over the telephone for new order to transfer pt to oral anticoagulation. New orders placed at this time.

## 2020-12-06 PROBLEM — N39.0 UTI (URINARY TRACT INFECTION): Status: RESOLVED | Noted: 2020-01-01 | Resolved: 2020-01-01

## 2020-12-06 NOTE — PROGRESS NOTES
Pulmonary [facility]                             Pulmonary Consult/Progress Note :        Follow up PE    Reason for Consultation:SOB ,abdomen pain   CC : SOB . abdomen pain   HPI:   Doing much better  No CP  No SOB   No fever or chills    PHYSICAL EXAMINATION:     VITAL SIGNS:  BP (!) 112/56   Pulse 80   Temp 97.7 °F (36.5 °C) (Temporal)   Resp 18   Ht 5' 5\" (1.651 m)   Wt 191 lb 9.6 oz (86.9 kg)   SpO2 95%   BMI 31.88 kg/m²   Wt Readings from Last 3 Encounters:   12/03/20 191 lb 9.6 oz (86.9 kg)   11/26/20 192 lb 9.6 oz (87.4 kg)   11/10/20 214 lb (97.1 kg)     Temp Readings from Last 3 Encounters:   12/05/20 97.7 °F (36.5 °C) (Temporal)   11/28/20 97.6 °F (36.4 °C) (Oral)   11/10/20 97.6 °F (36.4 °C) (Oral)     TMAX:  BP Readings from Last 3 Encounters:   12/05/20 (!) 112/56   11/28/20 (!) 118/58   11/10/20 (!) 146/63     Pulse Readings from Last 3 Encounters:   12/05/20 80   11/28/20 88   11/10/20 79           INTAKE/OUTPUTS:  I/O last 3 completed shifts:   In: 460.3 [P.O.:360; I.V.:100.3]  Out: 600 [Urine:600]    Intake/Output Summary (Last 24 hours) at 12/5/2020 2321  Last data filed at 12/5/2020 2106  Gross per 24 hour   Intake 460.3 ml   Output 600 ml   Net -139.7 ml       General Appearance: alert and oriented to person, place and time, well-developed and   well-nourished, in no acute distress   Eyes: pupils equal, round, and reactive to light, extraocular eye movements intact, conjunctivae normal and sclera anicteric   Neck: neck supple and non tender without mass, no thyromegaly, no thyroid nodules and no cervical adenopathy   Pulmonary/Chest:*rhonch   Cardiovascular: normal rate, regular rhythm, normal S1 and S2, no murmurs, rubs, clicks or gallops, distal pulses intact, no carotid bruits, no murmurs, no gallops, no carotid bruits and no JVD   Abdomen: obese, soft, non-tender, non-distended, normal bowel sounds, no masses or organomegaly Extremities:mild edema   Musculoskeletal: normal range of motion, no joint swelling, deformity or tenderness   Neurologic: reflexes normal and symmetric, no cranial nerve deficit noted    LABS/IMAGING:    CBC:  Lab Results   Component Value Date    WBC 7.4 12/05/2020    HGB 7.2 (L) 12/05/2020    HCT 23.6 (L) 12/05/2020    MCV 89.4 12/05/2020     12/05/2020    LYMPHOPCT 24.1 12/05/2020    RBC 2.64 (L) 12/05/2020    MCH 27.3 12/05/2020    MCHC 30.5 (L) 12/05/2020    RDW 18.3 (H) 12/05/2020    NEUTOPHILPCT 62.4 12/05/2020    MONOPCT 7.4 12/05/2020    BASOPCT 0.5 12/05/2020    NEUTROABS 4.62 12/05/2020    LYMPHSABS 1.78 12/05/2020    MONOSABS 0.55 12/05/2020    EOSABS 0.30 12/05/2020    BASOSABS 0.04 12/05/2020       Recent Labs     12/05/20  1023 12/04/20  0343 12/02/20  1946   WBC 7.4 10.8 11.7*   HGB 7.2* 8.8* 8.3*   HCT 23.6* 28.2* 26.7*   MCV 89.4 89.5 88.7    183 159       BMP:   Recent Labs     12/05/20  1023      K 3.6      CO2 22   BUN 31*   CREATININE 0.9       MG:   Lab Results   Component Value Date    MG 1.7 12/02/2020     Ca/Phos:   Lab Results   Component Value Date    CALCIUM 8.6 12/05/2020    PHOS 2.8 11/28/2020     Amylase:   Lab Results   Component Value Date    AMYLASE 43 12/31/2011     Lipase:   Lab Results   Component Value Date    LIPASE 22 11/24/2020     LIVER PROFILE:   Recent Labs     12/05/20  1023   AST 17   ALT 6   BILITOT 0.3   ALKPHOS 76       PT/INR:   No results for input(s): PROTIME, INR in the last 72 hours.   APTT:   Recent Labs     12/03/20  1149 12/04/20  0343 12/05/20  1023   APTT 61.2* 68.1* 103.9*       Cardiac Enzymes:  Lab Results   Component Value Date    TROPONINI 0.02 12/02/2020                 PROBLEM LIST:  Patient Active Problem List   Diagnosis    Hernia, abdominal    Postoperative incisional hernia    UTI (urinary tract infection)    Hypertension    DVT (deep venous thrombosis) (HCC)    Diabetes mellitus ([facility]    Colon cancer ([facility]    Asthma    Pulmonary embolism (HCC)    Arthritis    Hyponatremia    Abdominal wall seroma    Class 2 obesity in adult    MSSA bacteremia    GI bleed    Hypovolemic shock (HCC)    COVID-19    Acute pulmonary embolism without acute cor pulmonale (HCC)    Acute pulmonary embolism with acute cor pulmonale (HCC)    Presence of IVC filter               ASSESSMENT:  1.) Right pulmonary embolism   2.)Colon cancer   3.)R/O GI   4.)COVID pneumonia   5.)      PLAN:  Lovenox will be needed to treat het PE ,time will be decided as OP as she need follow up with hematology   Remedisivir tas per ID   Patient with IVC filter  Colon cancer   On RA so will hold on any further management       Thank you very much for allowing me to participate in the care of this pleasant patient , should you have any questions ,please do not hesitate to contact me      Nely Berger MD,[facility]  Pulmonary&Critical Care Medicine   Director of [address] [place] [address] Director of [address] [place] [address]    Paris Varghese    NOTE: This report was transcribed using voice recognition software. Every effort was made to ensure accuracy; however, inadvertent computerized transcription errors may be present.

## 2020-12-06 NOTE — PROGRESS NOTES
[facility] PROGRESS NOTE      Chief complaint: Follow-up of elevated procalcitonin     The patient is a 69 y.o. female with history of  DM, hypertension, DVT, colon cancer status post right hemicolectomy and incisional hernia repair with mesh in 7891 complicated by seroma formation status post aspiration on 11/06 during which no gross infection was noted, previously admitted from 09/99-08/76 for complicated MSSA bacteremia of unclear etiology (MAGALYS unable to be done due to patient's refusal) for which she was seen by Dr. Jean Pierre Munroe and was given a 4-week course of cefazolin from 11/05-11/25, then admitted on 11/24 for GI bleeding and mild COVID-19, presented on 12/02 for hypotension, found to have pulmonary embolism in and distal right need, lobar and segmental pulmonary arteries on CT abdomen and pelvis. On admission, she was afebrile and hypotensive with no leukocytosis. Procalcitonin level was elevated at 0.35 ng/mL. Urinalysis showed pyuria of 10-20 WBCs with urine culture growing Candida albicans. Blood cultures showed gram-positive diplococci in chains. Chest x-ray showed clear lungs. Subjective: Patient was seen and examined. No chills, no abdominal pain, no diarrhea, no rash, has itching. Objective:    Vitals:    12/06/20 0815   BP: (!) 117/55   Pulse: 80   Resp: 20   Temp: 98.1 °F (36.7 °C)   SpO2: 96%     Constitutional: Alert, not in distress  Respiratory: Clear breath sounds, no crackles, no wheezes  Cardiovascular: Regular rate and rhythm, no murmurs  Gastrointestinal: Bowel sounds present, soft, nontender  Skin: Warm and dry, no active dermatoses  Musculoskeletal: No joint swelling, no joint erythema    Labs, imaging, and medical records/notes were personally reviewed. Assessment:  Gram-positive cocci in chains bacteremia  Recent MSSA bacteremia. Finished 4-week of cefazolin 11/05-11/25.   GI bleeding  Pulmonary embolism  COVID-19 (tested positive on 11/24)  Asymptomatic bacteriuria  Candida albicans on urine culture, likely colonization    Recommendations:  Continue ceftriaxone 2 g every 24 hours. Repeat blood cultures tomorrow. Follow up blood cultures from 12/04 and 12/05. Check RVP. If SARS-CoV-2 PCR is negative, check MAGALYS. Monitor respiratory status. Wean off oxygen as tolerated. Continue supportive care.     Thank you for involving me in the care of [facility]. I will continue to follow. Please do not hesitate to call for any questions or concerns.     Electronically signed by Alessandro Dove MD on 12/6/2020 at 11:41 AM

## 2020-12-06 NOTE — PROGRESS NOTES
SEGSPCT 86 08/08/2013    BLASTSPCT 0.9 12/02/2020    METASPCT 0.9 12/02/2020    LYMPHOPCT 24.1 12/05/2020    PROMYELOPCT 0.9 12/02/2020    MONOPCT 7.4 12/05/2020    BASOPCT 0.5 12/05/2020    MONOSABS 0.55 12/05/2020    LYMPHSABS 1.78 12/05/2020    EOSABS 0.30 12/05/2020    BASOSABS 0.04 12/05/2020     CMP:    Lab Results   Component Value Date     12/05/2020    K 3.6 12/05/2020    K 3.2 11/24/2020     12/05/2020    CO2 22 12/05/2020    BUN 31 12/05/2020    CREATININE 0.9 12/05/2020    GFRAA >60 12/05/2020    LABGLOM >60 12/05/2020    GLUCOSE 156 12/05/2020    GLUCOSE 149 04/18/2012    PROT 6.0 12/05/2020    LABALBU 2.4 12/05/2020    LABALBU 4.0 04/18/2012    CALCIUM 8.6 12/05/2020    BILITOT 0.3 12/05/2020    ALKPHOS 76 12/05/2020    AST 17 12/05/2020    ALT 6 12/05/2020     Magnesium:    Lab Results   Component Value Date    MG 1.7 12/02/2020     Phosphorus:    Lab Results   Component Value Date    PHOS 2.8 11/28/2020     PT/INR:    Lab Results   Component Value Date    PROTIME 11.7 12/02/2020    INR 1.0 12/02/2020     Last 3 Troponin:    Lab Results   Component Value Date    TROPONINI 0.02 12/02/2020    TROPONINI <0.01 11/24/2020    TROPONINI <0.01 11/24/2020     U/A:    Lab Results   Component Value Date    COLORU Yellow 12/02/2020    PHUR 6.0 12/02/2020    WBCUA 10-20 12/02/2020    WBCUA NONE 12/31/2011    RBCUA 0-1 12/02/2020    RBCUA NONE 12/31/2011    YEAST Present 12/02/2020    BACTERIA RARE 12/02/2020    CLARITYU Clear 12/02/2020    SPECGRAV 1.020 12/02/2020    LEUKOCYTESUR Negative 12/02/2020    UROBILINOGEN 0.2 12/02/2020    BILIRUBINUR Negative 12/02/2020    BILIRUBINUR NEGATIVE 12/31/2011    BLOODU Negative 12/02/2020    GLUCOSEU 250 12/02/2020    GLUCOSEU 250 12/31/2011     HgBA1c:  No components found for: HGBA1C  TSH:    Lab Results   Component Value Date    TSH 1.380 06/24/2013     -----------------------------------------------------------------    Objective:   Vitals: BP (!) 118/58 Pulse 76   Temp 97.7 °F (36.5 °C) (Temporal)   Resp 18   Ht 5' 5\" (1.651 m)   Wt 191 lb 9.6 oz (86.9 kg)   SpO2 96%   BMI 31.88 kg/m²   General appearance: alert, appears stated age and cooperative  Skin: Skin color, texture, turgor normal. No rashes or lesions  HEENT: Head: Normal, normocephalic, atraumatic. Neck: no adenopathy, no carotid bruit, no JVD, supple, symmetrical, trachea midline and thyroid not enlarged, symmetric, no tenderness/mass/nodules  Lungs: diminished breath sounds bibasilar  Heart: regular rate and rhythm, S1, S2 normal, no murmur, click, rub or gallop  Abdomen: soft, non-tender; bowel sounds normal; no masses,  no organomegaly  Extremities: extremities normal, atraumatic, no cyanosis or edema  Neurologic: Mental status: Alert, oriented, thought content appropriate    Assessment:   Principal Problem:    Acute pulmonary embolism without acute cor pulmonale (HCC)  Active Problems:    Hypertension    Diabetes mellitus (HCC)    Colon cancer (HCC)    Abdominal wall seroma    Class 2 obesity in adult    COVID-19    Presence of IVC filter  Resolved Problems:    * No resolved hospital problems. *    Plan:   1. Start Lovenox per Hem/Onc.  2. Will have PT/OT to evaluate and treat. 3. Discharge plans on-going. She wants to go back to [facility]. 4. Will follow.     Maryam Ca D.O.  7:33 AM  12/6/2020

## 2020-12-07 NOTE — PROGRESS NOTES
General Progress Note  12/7/2020 7:39 AM  Subjective:   Admit Date: 12/2/2020  PCP: Martin Joshi DO  Interval History: no acute issues overnight. Patient wants to get out of here. Good appetite, normal bowel movements. Denies SOB or chest pain. Diet: DIET PEPTIC ULCER; Carb Control: 5 carb choices (75 gms)/meal; Daily Fluid Restriction: 1800 ml  Dietary Nutrition Supplements: Low Calorie High Protein Supplement  Dietary Nutrition Supplements: Wound Healing Oral Supplement  Pain is:None  Nausea:None  Bowel Movement/Flatus yes    Data:   Scheduled Meds:   cefTRIAXone (ROCEPHIN) IV  2 g Intravenous Q24H    enoxaparin  1 mg/kg Subcutaneous BID    mineral oil-hydrophilic petrolatum   Topical Q8H    gabapentin  400 mg Oral TID    montelukast  10 mg Oral Nightly    insulin lispro prot & lispro  80 Units Subcutaneous BID WC    insulin glargine  0.25 Units/kg Subcutaneous Nightly    insulin lispro  0-6 Units Subcutaneous TID WC    insulin lispro  0-3 Units Subcutaneous Nightly    pantoprazole  40 mg Intravenous BID    And    sodium chloride (PF)  10 mL Intravenous BID     Continuous Infusions:   dextrose       PRN Meds:diphenhydrAMINE, mineral oil-hydrophilic petrolatum, acetaminophen, guaiFENesin, magnesium hydroxide, ondansetron, glucose, dextrose, glucagon (rDNA), dextrose, heparin (porcine), heparin (porcine)  I/O last 3 completed shifts: In: 5 [P.O.:420]  Out: 0   No intake/output data recorded.     Intake/Output Summary (Last 24 hours) at 12/7/2020 0739  Last data filed at 12/7/2020 0521  Gross per 24 hour   Intake 420 ml   Output 0 ml   Net 420 ml       CBC with Differential:    Lab Results   Component Value Date    WBC 7.1 12/06/2020    RBC 2.78 12/06/2020    HGB 7.5 12/06/2020    HCT 25.1 12/06/2020     12/06/2020    MCV 90.3 12/06/2020    MCH 27.0 12/06/2020    MCHC 29.9 12/06/2020    RDW 18.2 12/06/2020    SEGSPCT 86 08/08/2013    BLASTSPCT 0.9 12/02/2020    METASPCT 0.9 (1.651 m)   Wt 191 lb 9.6 oz (86.9 kg)   SpO2 93%   BMI 31.88 kg/m²   General appearance: alert, appears stated age and cooperative  Skin: Skin color, texture, turgor normal. No rashes or lesions  HEENT: Head: Normal, normocephalic, atraumatic. Neck: no adenopathy, no carotid bruit, no JVD, supple, symmetrical, trachea midline and thyroid not enlarged, symmetric, no tenderness/mass/nodules  Lungs: clear to auscultation bilaterally  Heart: regular rate and rhythm, S1, S2 normal, no murmur, click, rub or gallop  Abdomen: soft, non-tender; bowel sounds normal; no masses,  no organomegaly  Extremities: extremities normal, atraumatic, no cyanosis or edema  Neurologic: Mental status: Alert, oriented, thought content appropriate    Assessment:   Principal Problem:    Acute pulmonary embolism without acute cor pulmonale (HCC)  Active Problems:    Hypertension    Diabetes mellitus (HCC)    Colon cancer (HCC)    Abdominal wall seroma    Class 2 obesity in adult    COVID-19    Presence of IVC filter  Resolved Problems:    * No resolved hospital problems. *    Plan:   1. If COVID-19 is negative per ID, will need MAGALYS. 2. Continue IV antibiotics per ID. 3. Consult SS regarding discharge planning.     Kenisha Faith D.O.  7:39 AM  12/7/2020

## 2020-12-07 NOTE — PROGRESS NOTES
Inpatient Hematology/Oncology Progress Note    Subjective:  SOB stable. Working with physical therapy    Objective:  /62   Pulse 84   Temp 99.3 °F (37.4 °C) (Axillary)   Resp 20   Ht 5' 5\" (1.651 m)   Wt 191 lb 9.6 oz (86.9 kg)   SpO2 96%   BMI 31.88 kg/m²   GENERAL: Alert, oriented x 3, not in acute distress. HEENT: PERRLA; EOMI. Oropharynx clear. LUNGS: Good air entry bilaterally. No wheezing, crackles or ronchi. CARDIOVASCULAR: Regular rate. No murmurs, rubs or gallops. ABDOMEN: Soft. Non-tender, non-distended. EXTREMITIES: Without clubbing, cyanosis, or edema. NEUROLOGIC: No focal deficits. Diagnostics:  Lab Results   Component Value Date    WBC 7.1 12/06/2020    HGB 7.5 (L) 12/06/2020    HCT 25.1 (L) 12/06/2020    MCV 90.3 12/06/2020     12/06/2020     Lab Results   Component Value Date     12/05/2020    K 3.6 12/05/2020     12/05/2020    CO2 22 12/05/2020    BUN 31 (H) 12/05/2020    CREATININE 0.9 12/05/2020    GLUCOSE 156 (H) 12/05/2020    CALCIUM 8.6 12/05/2020    PROT 6.0 (L) 12/05/2020    LABALBU 2.4 (L) 12/05/2020    BILITOT 0.3 12/05/2020    ALKPHOS 76 12/05/2020    AST 17 12/05/2020    ALT 6 12/05/2020    LABGLOM >60 12/05/2020    GFRAA >60 12/05/2020     Impression/Plan:  69 y. o. female with history of DM, hypertension, DVT, colon cancer s/p surgery 2006 and incisional hernia repair with mesh in 0599 complicated by seroma formation status post aspiration on 11/06 during which no gross infection was noted, previously admitted from 13/31-30/60 for complicated MSSA bacteremia of unclear etiology (MAGALYS unable to be done due to patient's refusal) for which she was seen by Dr. Eduardo Stanton and was given a 4-week course of cefazolin from 11/05-11/25, then admitted on 11/24 for? GI bleeding and mild COVID-19, presented on 12/02 for hypotension, found to have pulmonary embolism in distal right main, lobar and segmental pulmonary arteries on CT abdomen and pelvis.    On Lovenox therapeutic given hx of cancer. Hx of IVC filter  GS on board. No surgical intervention at this time  Vascular team on board. No surgical intervention at this time. IVC filter already in. Ok to d/c from Heme/Onc standpoint. PT/OT.  D/C planning     Janel Bhagat MD   12/7/2020

## 2020-12-07 NOTE — PROGRESS NOTES
Pharmacy Consultation Note  (Antibiotic Dosing and Monitoring)    Initial consult date: 20  Consulting physician: Ambar Onofre  Drug: Vancomycin  Indication: Bacteremia    Age/  Gender Height Weight IBW Dosing weight  Allergy Information   70 y.o./female 5' 5\" (165.1 cm) 192 lb (87.1 kg)     Ideal body weight: 57 kg (125 lb 10.6 oz)  Adjusted ideal body weight: 69 kg (152 lb 0.6 oz)  69 kg  Fluticasone-salmeterol      Temp (24hrs), Av.1 °F (36.7 °C), Min:97.1 °F (36.2 °C), Max:99.3 °F (37.4 °C)          Date  WBC BUN SCr CrCl  (mL/min) Drug/Dose Time   Given Level(s)   (Time) Comments    - - -  - Vancomycin 1500 mg IV x 1  1430          Vancomycin 1000 mg IV q12h  <0230>  <1430>                                 Intake/Output Summary (Last 24 hours) at 2020 1317  Last data filed at 2020 9320  Gross per 24 hour   Intake 300 ml   Output 0 ml   Net 300 ml       Historical Cultures:  Organism   Date Value Ref Range Status   2020 Enterococcus faecalis (A)  Preliminary     Recent Labs     20  1215   BC Gram stain performed from blood culture bottle media  Gram positive diplococci in chains  Previously positive blood culture called  2020  Gram stain performed from blood culture bottle media  Gram positive cocci in clusters  *  Refer to previous blood culture (CXBL Left Peripheral)  collected 20 @ 0343 for susceptibility results         Cultures:  available culture and sensitivity results were reviewed in [facility]    Assessment:  · 69 y.o. female has been initiated vancomycin for bacteremia. Recent MSSA bacteremia. Finished 4-week of cefazolin -. Asymptomatic bacteriuria, Candida albicans on urine culture, likely colonization. · Estimated CrCl = 62 mL/min  · Goal trough level = 15-20 mcg/mL  · : sCr 0.9 (baseline 0.7), BUN 31, WBC 7.4    Plan:  · Will initiate vancomycin at a dose of 1000 mg q12h  · Ordered BMP for tomorrow with AM labs.   · Monitor renal function · Clinical pharmacy to follow      Jose Manuel Zhou [facility] 12/7/2020 1:17 PM

## 2020-12-07 NOTE — PROGRESS NOTES
Comprehensive Nutrition Assessment    Type and Reason for Visit:  Reassess    Nutrition Recommendations/Plan: Recommend to continue Ensure High Protein supplement and Jean wound healing BID to help meet increased nutritional needs. Nutrition Assessment:  Patients po intake has been a little sporadic, averaging 50-75% of meals served ; hx of colon CA w/ right hemicolectomy ; pt also COVID-19 positive ; noted PE ; will continue ONS    Malnutrition Assessment:  Malnutrition Status: At risk for malnutrition (Comment)    Context:  Acute Illness     Findings of the 6 clinical characteristics of malnutrition:  Energy Intake:  1 - 75% or less of estimated energy requirements for 7 or more days  Weight Loss:  Unable to assess(d/t possible fluid shifts)     Body Fat Loss:  Unable to assess(data not available to assess at this time d/t COVID isolation)     Muscle Mass Loss:  Unable to assess(data not available to assess at this time d/t COVID isolation)    Fluid Accumulation:  No significant fluid accumulation     Strength:  Not Performed    Estimated Daily Nutrient Needs:  Energy (kcal):  5326-2048 (REE 1384 x 1.2 SF); Weight Used for Energy Requirements:  Current     Protein (g):   (1.5-1.8g/kg IBW);  Weight Used for Protein Requirements:  Ideal        Fluid (ml/day):  6874-9699; Method Used for Fluid Requirements:  1 ml/kcal      Nutrition Related Findings:  I&Os WNL, trace edema, active BS, rounded/tender abd, missing teeth, confused at times, boggy heels, red/excoriation to buttocks      Wounds:  Surgical Incision(Incision x 1 noted to abd ; healing ostomy site)       Current Nutrition Therapies:    DIET PEPTIC ULCER; Carb Control: 5 carb choices (75 gms)/meal; Daily Fluid Restriction: 1800 ml  Dietary Nutrition Supplements: Low Calorie High Protein Supplement  Dietary Nutrition Supplements: Wound Healing Oral Supplement    Anthropometric Measures:  · Height: 5' 5\" (165.1 cm)  · Current Body Weight: 191

## 2020-12-07 NOTE — PROGRESS NOTES
[facility] PROGRESS NOTE      Chief complaint: Follow-up of elevated procalcitonin     The patient is a 69 y.o. female with history of  DM, hypertension, DVT, colon cancer status post right hemicolectomy and incisional hernia repair with mesh in 5203 complicated by seroma formation status post aspiration on 11/06 during which no gross infection was noted, previously admitted from 48/97-91/93 for complicated MSSA bacteremia of unclear etiology (MAGALYS unable to be done due to patient's refusal) for which she was seen by Dr. Eduardo Stanton and was given a 4-week course of cefazolin from 11/05-11/25, then admitted on 11/24 for GI bleeding and mild COVID-19, presented on 12/02 for hypotension, found to have pulmonary embolism in and distal right need, lobar and segmental pulmonary arteries on CT abdomen and pelvis. On admission, she was afebrile and hypotensive with no leukocytosis. Procalcitonin level was elevated at 0.35 ng/mL. Urinalysis showed pyuria of 10-20 WBCs with urine culture growing Candida albicans. Blood cultures showed gram-positive diplococci in chains. Chest x-ray showed clear lungs. Respiratory pathogen PCR panel, including SARS-CoV-2 PCR was negative. Subjective: Patient was seen and examined. No chills, no abdominal pain, no diarrhea, no rash, has itching. She is upset and demanding to be let up in the chair.       Objective:    Vitals:    12/07/20 0900   BP: 131/62   Pulse: 84   Resp: 20   Temp: 99.3 °F (37.4 °C)   SpO2: 96%     Constitutional: Alert, not in distress  Respiratory: Clear breath sounds, no crackles, no wheezes  Cardiovascular: Regular rate and rhythm, no murmurs  Gastrointestinal: Bowel sounds present, soft, nontender  Skin: Warm and dry, no active dermatoses  Musculoskeletal: No joint swelling, no joint erythema    Laboratory:  Lab Results   Component Value Date    WBC 7.1 12/06/2020    WBC 7.4 12/05/2020    WBC 10.8 12/04/2020    HGB 7.5 (L) 12/06/2020    HCT 25.1 (L) 12/06/2020    MCV 90.3 12/06/2020     12/06/2020     Lab Results   Component Value Date    NEUTROABS 4.62 12/05/2020    NEUTROABS 12.82 (H) 12/02/2020    NEUTROABS 5.03 11/28/2020     Lab Results   Component Value Date    CRP 2.4 (H) 11/24/2020    CRP 2.3 (H) 07/19/2011    CRP 16.1 (H) 07/13/2011     No results found for: CRPHS  Lab Results   Component Value Date    SEDRATE 71 (H) 11/24/2020    SEDRATE 88 (H) 07/19/2011    SEDRATE 103 (H) 07/13/2011     Lab Results   Component Value Date    ALT 6 12/05/2020    AST 17 12/05/2020    ALKPHOS 76 12/05/2020    BILITOT 0.3 12/05/2020     Lab Results   Component Value Date     12/05/2020    K 3.6 12/05/2020    K 3.2 11/24/2020     12/05/2020    CO2 22 12/05/2020    BUN 31 12/05/2020    CREATININE 0.9 12/05/2020    CREATININE 1.2 12/02/2020    CREATININE 0.7 11/28/2020    GFRAA >60 12/05/2020    LABGLOM >60 12/05/2020    GLUCOSE 156 12/05/2020    GLUCOSE 149 04/18/2012    PROT 6.0 12/05/2020    LABALBU 2.4 12/05/2020    LABALBU 4.0 04/18/2012    CALCIUM 8.6 12/05/2020    BILITOT 0.3 12/05/2020    ALKPHOS 76 12/05/2020    AST 17 12/05/2020    ALT 6 12/05/2020       Radiology: CT abdomen and pelvis (12/02): Findings most compatible with nonocclusive PE in the distal right main, lobar, and segmental pulmonary arteries.    Bilateral lower lobe interstitial and reticular infiltrates may be edema and/or pneumonitis, new from 11/04/2020.  They appear unchanged from 11/24/2020. Findings again most compatible with hepatic steatosis   Coronary artery calcification   Prior cholecystectomy with postoperative reservoir effect in the common bile   duct   Extensive plaque and noncalcified plaque in abdominal aorta and branches may   be associated with stenosis   Stable IVC filter   Stable large anterior abdominal wall rim calcified cystic lesion.    Considerations again include chronic seroma and/or old hematoma       Microbiology:   Organism   Date Value Ref Range Status   12/05/2020

## 2020-12-07 NOTE — PROGRESS NOTES
Physical Therapy  Physical Therapy Initial Assessment   Name: Brandon Argueta  : 1950  MRN: [id number]    Referring Provider:  Emeli Parrish DO    Date of Service: 2020    Evaluating PT:  Jany Waggoner, PT, DPT [id number]    Room #:  8593/6533-W  Diagnosis:  Acute PE without acute cor pulmonale, unspecified PE  PMHx/PSHx:  DM, arthritis, DVT, PE, Asthma, HTN, colon CA  Precautions:  Falls, DROPLET PLUS FOR COVID-19, confusion  Equipment Needs:  TBD    SUBJECTIVE:  Pt admitted from [place] [address]. Pt recently discharged from [facility] and states she has not started her rehab yet. PTA pt lived with  in a1 story home with 2 stairs to enter and 1 rail. Bed is on first floor and bath is on first floor. Pt ambulated with front [employer] Brianna PTA.  assists with ADLs as needed. OBJECTIVE:   Initial Evaluation  Date: 2020 Treatment Short Term/ Long Term   Goals   AM-PAC 6 Clicks      Was pt agreeable to Eval/treatment? yes     Does pt have pain? No c/o pain. Pt reports itching all over. Bed Mobility  Rolling: modA  Supine to sit: modA  Sit to supine: NT  Scooting: Rupesh  Rolling: Independent  Supine to sit: Independent  Sit to supine: Independent  Scooting: Independent   Transfers Sit to stand: maxA  Stand to sit: maxA  Stand pivot: maxA front [employer]  Sit to stand: Rupesh  Stand to sit: Rupesh  Stand pivot: Rupesh front [employer]   Ambulation    NT  25 feet with front [employer] negotiation: ascended and descended  NT  2 steps with 1 rail Rupesh   ROM BUE:  Per OT note  BLE:  WNL     Strength BUE:  Per OT note  BLE:  WNL     Balance Sitting EOB:  SBA  Dynamic Standing:  maxA front [employer]  Sitting EOB:  Independent  Dynamic Standing:  Rupesh front [employer]     Pt is A & O x 4. Pt perseverating on reporting that she is itchy. She is also stating the nursing staff has not bathed her despite nursing staff reporting pt has been bathed multiple times since admission.   Pt also insists the nursing staff is refusing to take care of her and being too hard on her. Sensation:  Pt denies numbness and tingling to extremities  Edema:  unremarkable    Therapeutic Exercises:  hip flexion, LAQ, ankle df/pf x 10 reps each bilaterally    Patient education  Pt educated on role of PT intervention. Pt educated on safety in room with utilization of call light for assistance with mobility. Pt educated on importance of maximizing OOB time by transferring to bedside chair for meals and ambulating to bathroom/transferring to bedside commode with assistance from nursing and therapy staff to increase functional activity tolerance and overall functional independence. Pt educated on importance of independent performance of therapeutic exercises designated above for improved strength, activity tolerance, and ROM. Patient response to education:   Pt verbalized understanding Pt demonstrated skill Pt requires further education in this area   yes yes yes     ASSESSMENT:    Comments:  Additional time was required for donning/doffing of all necessary PPE for droplet plus isolation for COVID-19 as well as proper sanitization of all equipment utilized during session. RN cleared pt for activity prior to session. Pt received supine in bed and agreeable to PT intervention at this time. Pt performed all functional mobility as noted above. Additional time required to complete session, d/t pt perseverating on numerous complaints and repeatedly requesting back rubs before she will participate with therapy. Pt assisted to EOB where she reported light headedness that resolved with time. Pt then took three attempts to successfully transfer to bedside chair. Pt was incontinent of urine during session so linen and floor was cleaned. At end of session, pt left in bedside chair with all needs met and call light in reach. Requested RN acquire depends, bedside commode, and front [employer] for pt during acute care stay.   Pt requires continued skilled PT intervention for the purposes of improving functional mobility and independence. Treatment:  Patient practiced and was instructed in the following treatment:     Therapeutic Activities Completed:  o Functional mobility as noted above:   - Bed mobility: modA to complete. Pt reliant on bed rail. Cues and hand over hand guidance provided to facilitate efficient use of BUE on bed rail for more independent completion of task. - Transfer training: sit<>stand maxA x 3 reps. Max VC for proper hand placement and pt insists on grabbing front [employer] to pull herself to standing. Stand pivot with front [employer] maxA. maxA and cueing for lateral weight shift to allow for advancement of LE towards chair. Pt retropulsive in standing requiring maxA to correct. Cues for proper front [employer] sequencing and placement as well.    o Skilled repositioning in seated position for comfort.  o Pt education as noted above.  o Pt hygiene care for incontinence of urine. Pt's/ family goals   1. Return home. Patient and or family understand(s) diagnosis, prognosis, and plan of care. yes    PLAN OF CARE:    Current Treatment Recommendations     [x] Strengthening     [x] ROM   [x] Balance Training   [x] Endurance Training   [x] Transfer Training   [x] Gait Training   [x] Stair Training   [x] Positioning   [x] Safety and Education Training   [x] Patient/Caregiver Education   [] HEP  [] Other     PT care will be provided in accordance with the objectives noted above. The above treatment recommendations will be utilized to address deficits described above in order to restore pt's prior level of function and/or achieve modified functional independence with adaptive strategies. Frequency of treatments: 2-5x/week x 1-2 weeks.     Time in  1400  Time out  1430    Total Treatment Time  25 minutes     Evaluation Time includes thorough review of current medical information, gathering information on past medical history/social history and prior level of function, completion of standardized testing/informal observation of tasks, assessment of data and education on plan of care and goals.     CPT codes:  [] Low Complexity PT evaluation 91850  [x] Moderate Complexity PT evaluation 09054  [] High Complexity PT evaluation 16957  [] PT Re-evaluation 58965  [] Gait training 30233 0 minutes  [] Manual therapy 16442 0 minutes  [x] Therapeutic activities 10316 25 minutes  [] Therapeutic exercises 37844 0 minutes  [] Neuromuscular reeducation 32164 0 minutes     Ether Lev, PT, DPT  [id number]

## 2020-12-07 NOTE — PROGRESS NOTES
Patient demanding to be bathed, she was just bathed last night at 2200. Patient refused blood sugar unless she is bathed. Patient insists she has not been bathed however it is documented that patient has in fact been bathed. Patient c/o itchiness, was given Benedryl x1.

## 2020-12-08 NOTE — PROGRESS NOTES
General Progress Note  12/8/2020 7:29 AM  Subjective:   Admit Date: 12/2/2020  PCP: Radha Joshi DO  Interval History: patient awake and alert. Threatening to leave 'this place'. Denies any SOB or chest pain. Appetite is fair. Normal bowel movements. Diet: DIET PEPTIC ULCER; Carb Control: 5 carb choices (75 gms)/meal; Daily Fluid Restriction: 1800 ml  Dietary Nutrition Supplements: Low Calorie High Protein Supplement  Dietary Nutrition Supplements: Wound Healing Oral Supplement  Pain is:None  Nausea:None  Bowel Movement/Flatus yes    Data:   Scheduled Meds:   vancomycin  1,000 mg Intravenous Q12H    enoxaparin  1 mg/kg Subcutaneous BID    mineral oil-hydrophilic petrolatum   Topical Q8H    gabapentin  400 mg Oral TID    montelukast  10 mg Oral Nightly    insulin lispro prot & lispro  80 Units Subcutaneous BID WC    insulin glargine  0.25 Units/kg Subcutaneous Nightly    insulin lispro  0-6 Units Subcutaneous TID WC    insulin lispro  0-3 Units Subcutaneous Nightly    pantoprazole  40 mg Intravenous BID    And    sodium chloride (PF)  10 mL Intravenous BID     Continuous Infusions:   dextrose       PRN Meds:diphenhydrAMINE, mineral oil-hydrophilic petrolatum, acetaminophen, guaiFENesin, magnesium hydroxide, ondansetron, glucose, dextrose, glucagon (rDNA), dextrose, heparin (porcine), heparin (porcine)  I/O last 3 completed shifts: In: 240 [P.O.:240]  Out: 0   No intake/output data recorded.     Intake/Output Summary (Last 24 hours) at 12/8/2020 0729  Last data filed at 12/8/2020 0601  Gross per 24 hour   Intake 240 ml   Output 0 ml   Net 240 ml       CBC with Differential:    Lab Results   Component Value Date    WBC 7.1 12/06/2020    RBC 2.78 12/06/2020    HGB 7.5 12/06/2020    HCT 25.1 12/06/2020     12/06/2020    MCV 90.3 12/06/2020    MCH 27.0 12/06/2020    MCHC 29.9 12/06/2020    RDW 18.2 12/06/2020    SEGSPCT 86 08/08/2013    BLASTSPCT 0.9 12/02/2020    METASPCT 0.9 12/02/2020 LYMPHOPCT 24.1 12/05/2020    PROMYELOPCT 0.9 12/02/2020    MONOPCT 7.4 12/05/2020    BASOPCT 0.5 12/05/2020    MONOSABS 0.55 12/05/2020    LYMPHSABS 1.78 12/05/2020    EOSABS 0.30 12/05/2020    BASOSABS 0.04 12/05/2020     CMP:    Lab Results   Component Value Date     12/05/2020    K 3.6 12/05/2020    K 3.2 11/24/2020     12/05/2020    CO2 22 12/05/2020    BUN 31 12/05/2020    CREATININE 0.9 12/05/2020    GFRAA >60 12/05/2020    LABGLOM >60 12/05/2020    GLUCOSE 156 12/05/2020    GLUCOSE 149 04/18/2012    PROT 6.0 12/05/2020    LABALBU 2.4 12/05/2020    LABALBU 4.0 04/18/2012    CALCIUM 8.6 12/05/2020    BILITOT 0.3 12/05/2020    ALKPHOS 76 12/05/2020    AST 17 12/05/2020    ALT 6 12/05/2020     Magnesium:    Lab Results   Component Value Date    MG 1.7 12/02/2020     Phosphorus:    Lab Results   Component Value Date    PHOS 2.8 11/28/2020     PT/INR:    Lab Results   Component Value Date    PROTIME 11.7 12/02/2020    INR 1.0 12/02/2020     Last 3 Troponin:    Lab Results   Component Value Date    TROPONINI 0.02 12/02/2020    TROPONINI <0.01 11/24/2020    TROPONINI <0.01 11/24/2020     U/A:    Lab Results   Component Value Date    COLORU Yellow 12/02/2020    PHUR 6.0 12/02/2020    WBCUA 10-20 12/02/2020    WBCUA NONE 12/31/2011    RBCUA 0-1 12/02/2020    RBCUA NONE 12/31/2011    YEAST Present 12/02/2020    BACTERIA RARE 12/02/2020    CLARITYU Clear 12/02/2020    SPECGRAV 1.020 12/02/2020    LEUKOCYTESUR Negative 12/02/2020    UROBILINOGEN 0.2 12/02/2020    BILIRUBINUR Negative 12/02/2020    BILIRUBINUR NEGATIVE 12/31/2011    BLOODU Negative 12/02/2020    GLUCOSEU 250 12/02/2020    GLUCOSEU 250 12/31/2011     HgBA1c:  No components found for: HGBA1C  TSH:    Lab Results   Component Value Date    TSH 1.380 06/24/2013     -----------------------------------------------------------------    Objective:   Vitals: BP (!) 134/57   Pulse 98   Temp 98 °F (36.7 °C) (Temporal)   Resp 20   Ht 5' 5\" (1.651 m)

## 2020-12-08 NOTE — PROGRESS NOTES
Patient educated on needing new IV. She refused 2x for this nurse and the midnight nurse also reported that she refused. Patient has been repeating over and over that she didn't want to be here anymore and that she wanted to leave. Patient also went as far to say that she didn't have to stay if she didn't want to. On the phone with her  she was begging him to come get her.

## 2020-12-08 NOTE — PROGRESS NOTES
Patient was already discharged when I came to visit. Apparently, her IV line infiltrated last night and she refused re-insertion of IV access. Nevertheless, I do not recommend discharge unless patient does not want any further treatment. Assessment:  Enterococcus faecalis and Staphylococcus sp bacteremia  Recent MSSA bacteremia. Finished 4-week of cefazolin 11/05-11/25. GI bleeding  Pulmonary embolism  COVID-19 (tested positive on 11/24)  Asymptomatic bacteriuria  Candida albicans on urine culture, likely colonization    Recommendations:  Continue vancomycin dosed according to trough per Pharmacy pending blood culture results. Follow up blood cultures from 12/04, 12/05, 12/07. Recommend checking MAGALYS but patient refused MAGALYS. Monitor respiratory status. Wean off oxygen as tolerated. Continue supportive care.     Thank you for involving me in the care of [facility]. Please do not hesitate to call for any questions or concerns.     Electronically signed by Shelly Palacios MD on 12/8/2020 at 11:31 AM

## 2020-12-08 NOTE — DISCHARGE INSTR - COC
Continuity of Care Form    Patient Name: Judson Bhat   :  1950  MRN:  [id number]    Admit date:  2020  Discharge date:  2020    Code Status Order: Prior   Advance Directives:   [facility] Documentation     Date/Time Healthcare Directive Type of Healthcare Directive Copy in [address] Agent's Name Healthcare Agent's Phone Number    20 0011  Yes, patient has an advance directive for healthcare treatment  Living will  Yes, copy in chart  Spouse  Sarah Stage  --          Admitting Physician:  Mahad Gregorio DO  PCP: Mahad Gregorio DO    Discharging Nurse: [address] [place] [address] Unit/Room#: 1852/8026-K  Discharging Unit Phone Number: [phone]    Emergency Contact:   Extended Emergency Contact Information  Primary Emergency Contact: Bre Martines  Address: [address] [place] [address] Phone: [phone]  Mobile Phone: [phone]  Relation: Spouse   needed? No    Past Surgical History:  Past Surgical History:   Procedure Laterality Date    ABDOMINAL HERNIA REPAIR  13    CHOLECYSTECTOMY      COLON SURGERY      partial resection    INSERT PICC LINE  11/10/2020         OVARY REMOVAL      bilateral       Immunization History: There is no immunization history for the selected administration types on file for this patient. Active Problems:  Patient Active Problem List   Diagnosis Code    Hernia, abdominal K46.9    Postoperative incisional hernia K43.2    Hypertension I10    DVT (deep venous thrombosis) (HCC) I82.409    Diabetes mellitus ([address] E11.9    Colon cancer ([address] C18.9    Asthma J45.909    Pulmonary embolism (HCC) I26.99    Arthritis M19.90    Hyponatremia E87.1    Abdominal wall seroma S30. 1XXA    Class 2 obesity in adult E66.9    MSSA bacteremia R78.81, B95.61    GI bleed K92.2    Hypovolemic shock (HCC) R57.1    COVID-19 U07.1  Acute pulmonary embolism without acute cor pulmonale (HCC) I26.99    Acute pulmonary embolism with acute cor pulmonale (HCC) I26.09    Presence of IVC filter Z95.828       Isolation/Infection:   Isolation          Droplet Plus        Patient Infection Status     Infection Onset Added Last Indicated Last Indicated By Review Planned Expiration Resolved Resolved By    COVID-19 11/24/20 11/24/20 11/24/20 Respiratory Panel, Molecular, with COVID-19 (Restricted: peds pts or suitable admitted adults) 12/01/20 12/08/20      Resolved    COVID-19 Rule Out 11/24/20 11/24/20 11/24/20 Respiratory Panel, Molecular, with COVID-19 (Restricted: peds pts or suitable admitted adults) (Ordered)   11/24/20 Rule-Out Test Resulted    COVID-19 Rule Out 11/06/20 11/06/20 11/06/20 Covid-19 Ambulatory (Ordered)   11/08/20 Rule-Out Test Resulted          Nurse Assessment:  Last Vital Signs: /62   Pulse 84   Temp 96.9 °F (36.1 °C) (Temporal)   Resp 18   Ht 5' 5\" (1.651 m)   Wt 191 lb 9.6 oz (86.9 kg)   SpO2 98%   BMI 31.88 kg/m²     Last documented pain score (0-10 scale): Pain Level: 0  Last Weight:   Wt Readings from Last 1 Encounters:   12/03/20 191 lb 9.6 oz (86.9 kg)     Mental Status:  oriented and alert    IV Access:  - None    Nursing Mobility/ADLs:  Walking   Assisted  Transfer  Assisted  Bathing  Assisted  Dressing  Assisted  Toileting  Assisted  Feeding  Independent  Med Admin  Assisted  Med Delivery   whole    Wound Care Documentation and Therapy:  Wound 11/04/20 Abdomen (Active)   Dressing Status Clean;Dry; Intact 12/07/20 2200   Wound Cleansed Cleansed with saline 11/09/20 1820   Dressing/Treatment Dry dressing 12/07/20 2200   Dressing Change Due 12/03/20 12/03/20 0750   Wound Length (cm) 5 cm 12/03/20 0004   Wound Width (cm) 10 cm 12/03/20 0004   Wound Depth (cm) 0.01 cm 12/03/20 0004   Wound Surface Area (cm^2) 50 cm^2 12/03/20 0004   Change in Wound Size % (l*w) 21.88 12/03/20 0004   Wound Volume (cm^3) 0.5 cm^3 12/03/20 0004   Wound Healing % 92 12/03/20 0004   Wound Assessment Dry;Pink/red; Other (Comment) 12/03/20 0750   Drainage Amount None 12/03/20 0750   Drainage Description Yellow;Green 12/03/20 0750   Odor None 12/03/20 0750   Velma-wound Assessment Intact 12/03/20 0750   Number of days: 33       Wound 12/07/20 Buttocks Left; Inner (Active)   Dressing Status Other (Comment) 12/07/20 0900   Wound Cleansed Cleansed with saline 12/07/20 0900   Dressing/Treatment Open to air 12/07/20 0900   Wound Length (cm) 1 cm 12/07/20 2200   Wound Width (cm) 1 cm 12/07/20 2200   Wound Depth (cm) 0.01 cm 12/07/20 2200   Wound Surface Area (cm^2) 1 cm^2 12/07/20 2200   Wound Volume (cm^3) 0.01 cm^3 12/07/20 2200   Wound Assessment Pink/red 12/07/20 2200   Drainage Amount None 12/07/20 2200   Velma-wound Assessment Blanchable erythema 12/07/20 0900   Number of days: 1        Elimination:  Continence:   · Bowel: {YES / XM:69017}  · Bladder: {YES / AC:47950}  Urinary Catheter: {Urinary Catheter:259375687}   Colostomy/Ileostomy/Ileal Conduit: {YES / CP:06172}       Date of Last BM: ***    Intake/Output Summary (Last 24 hours) at 12/8/2020 0958  Last data filed at 12/8/2020 0601  Gross per 24 hour   Intake 240 ml   Output 0 ml   Net 240 ml     I/O last 3 completed shifts: In: 240 [P.O.:240]  Out: 0     Safety Concerns: At Risk for Falls    Impairments/Disabilities:      None    Nutrition Therapy:  Current Nutrition Therapy:   - Oral Diet:  Carb Control 4 carbs/meal (1800kcals/day), 1800mL Fluid Restriction     Routes of Feeding: Oral  Liquids: No Restrictions  Daily Fluid Restriction: yes - amount 1800  Last Modified Barium Swallow with Video (Video Swallowing Test): not done    Treatments at the Time of Hospital Discharge:   Respiratory Treatments: ***  Oxygen Therapy:  is not on home oxygen therapy.   Ventilator:    - No ventilator support    Rehab Therapies: Physical Therapy and Occupational Therapy  Weight Bearing Status/Restrictions: No weight bearing restirctions  Other Medical Equipment (for information only, NOT a DME order):  walker  Other Treatments: ***    Patient's personal belongings (please select all that are sent with patient):  None    RN SIGNATURE:  Electronically signed by Tasneem Mendoza RN on 20 at 10:02 AM EST    CASE MANAGEMENT/SOCIAL WORK SECTION    Inpatient Status Date: ***    Readmission Risk Assessment Score:  Readmission Risk              Risk of Unplanned Readmission:        29           Discharging to Facility/ Agency   · Name:   · Address:  · Phone:  · Fax:    Dialysis Facility (if applicable)   · Name:  · Address:  · Dialysis Schedule:  · Phone:  · Fax:    / signature: {Esignature:[id number]}    PHYSICIAN SECTION    Prognosis: {Prognosis:7064007041}    Condition at Discharge: [address] [place] [address] Patient Condition:938230608}    Rehab Potential (if transferring to Rehab): {Prognosis:9217544689}    Recommended Labs or Other Treatments After Discharge: ***    Physician Certification: I certify the above information and transfer of Vijay Wood  is necessary for the continuing treatment of the diagnosis listed and that she requires {Admit to Appropriate Level of Care:72911} for {GREATER/LESS:853129326} 30 days.      Update Admission H&P: {CHP DME Changes in QVTB}    PHYSICIAN SIGNATURE:  {Esignature:[id number]}

## 2020-12-08 NOTE — CARE COORDINATION
Pt discharging to [facility] today. Physician ambulance for 11am pick. Notified Esthela([place]), spouse-Abhijeet, and charge RN of discharge/time. Griselda Loft LSW

## 2020-12-08 NOTE — PROGRESS NOTES
Pharmacy Consultation Note  (Antibiotic Dosing and Monitoring)    Initial consult date: 20  Consulting physician: Jessica Paiz  Drug: Vancomycin  Indication: Bacteremia    Age/  Gender Height Weight IBW Dosing weight  Allergy Information   70 y.o./female 5' 5\" (165.1 cm) 192 lb (87.1 kg)     Ideal body weight: 57 kg (125 lb 10.6 oz)  Adjusted ideal body weight: 69 kg (152 lb 0.6 oz)  69 kg  Fluticasone-salmeterol      Temp (24hrs), Av.7 °F (36.5 °C), Min:96.5 °F (35.8 °C), Max:99.3 °F (37.4 °C)          Date  WBC BUN SCr CrCl  (mL/min) Drug/Dose Time   Given Level(s)   (Time) Comments    - - -  - Vancomycin 1500 mg IV x 1  1700          Vancomycin 1000 mg IV q12h  0390  <1830>                                 Intake/Output Summary (Last 24 hours) at 2020 0816  Last data filed at 2020 0601  Gross per 24 hour   Intake 240 ml   Output 0 ml   Net 240 ml       Historical Cultures:  Organism   Date Value Ref Range Status   2020 Enterococcus faecalis (A)  Preliminary     Recent Labs     20  1215   BC Gram stain performed from blood culture bottle media  Gram positive diplococci in chains  Previously positive blood culture called  2020  Gram stain performed from blood culture bottle media  Gram positive cocci in clusters  *  Refer to previous blood culture (CXBL Left Peripheral)  collected 20 @ 0343 for susceptibility results         Cultures:  available culture and sensitivity results were reviewed in [facility]    Assessment:  · 69 y.o. female has been initiated vancomycin for bacteremia. Recent MSSA bacteremia. Finished 4-week of cefazolin -. Asymptomatic bacteriuria, Candida albicans on urine culture, likely colonization. · Estimated CrCl = 62 mL/min  · Goal trough level = 15-20 mcg/mL  · : sCr 0.9 (baseline 0.7), BUN 31, WBC 7.4    Plan:  · Will initiate vancomycin at a dose of 1000 mg q12h  · Messaged Dr. Bette Pizano regarding continuing vancomycin outpatient at [facility]. Did not receive a response. · Ordered BMP for tomorrow with AM labs.   · Monitor renal function   · Clinical pharmacy to follow      John York, [address] [facility] 12/8/2020 8:16 AM

## 2020-12-08 NOTE — PROGRESS NOTES
Occupational Therapy  OCCUPATIONAL THERAPY INITIAL EVALUATION      Date:2020  Patient Name: Sun Loyola  MRN: [id number]  : 1950  Room: [address] [place] [address]    Referring Provider:  Madison Morgan DO     Evaluating OT: Faye Ramesh OTR/L #[id number]     AM-PAC Daily Activity Raw Score:     Recommended Adaptive Equipment:  TBD     Diagnosis: Acute PE   Patient presented to the hospital for hypotension and + PE. Pt with recent hospitalization for GI bleed;  COVID +     Pertinent Medical History:    Past Medical History:   Diagnosis Date    Arthritis     Asthma     no recent attacks past 18 months    Colon cancer ([facility] 2006    colon CA    Diabetes mellitus ([facility]     DVT (deep venous thrombosis) ([facility]     Hernia of abdominal cavity     Hypertension     pt states \"pretty good\"; on no meds    Other disorders of kidney and ureter     kidney stone    PE (pulmonary embolism)       Precautions:  Falls, Droplet plus (COVID-19),  Incontinence, bed alarm, TAPS     Home Living: Pt is a questionable historian:  Admitted from [facility]. Lives with  in a 1 story home with 2 ORAL and 1 hand rail. Bathroom setup: ?   Equipment owned: w/w    Prior Level of Function: mod I with ADLs , mod I with IADLs; ambulated with   Driving: na  Occupation: na    Pain Level: Pt denies pain this session    Cognition: A&O: 2/4  (self and hospital);  Follows 1 step directions   Memory:  Poor (0/3 short term item recall)   Sequencing:  P+   Problem solving:  P+   Judgement/safety:  Poor   Impaired insight into deficits   Perseverating on having back washed throughout session despite being done multiple times     Functional Assessment:   Initial Eval Status  Date: 20 Treatment Status  Date: Short Term Goals = Long Term Goals  Treatment frequency: 1-4x/wk; PRN   Feeding Set-up   indep    Grooming Minimal Assist    Seated grooming tasks at EOB   Supervision    UB Dressing Minimal Assist     [place]/ donning gown; assist with threading UEs/ around back  Supervision    LB Dressing Dependent   Moderate Assist    Bathing Maximal Assist  Moderate Assist    Toileting Dependent     Incontinent of urine; dependent for hygiene   Moderate Assist    Bed Mobility  Supine to sit: Moderate Assist   Sit to supine: Moderate Assist   Supine to sit: Minimal Assist   Sit to supine: Minimal Assist    Functional Transfers Moderate Assist     Sit to stand transfers; + incontinence in standing   Minimal Assist    Functional Mobility NT    Unable to complete side steps to [facility] this date   Moderate Assist    Balance Sitting:     Static:  Min A    Dynamic:min A  Standing: mod A at w/w ; + posterior lean      Activity Tolerance F-    Light activity  F+   Visual/  Perceptual wfl               Vitals: On RA  O2 sat 96-99%, HR 81-96 bpm      Hand dominance: right      Strength ROM Additional Info:    RUE   4-/5 wfl good  and wfl FMC/dexterity noted during ADL tasks     LUE 4-/5 wfl good  and wfl FMC/dexterity noted during ADL tasks     Hearing: wfl  Sensation:wfl  Tone: wfl  Edema: B LE edema                             Comments: Upon arrival, patient supine in bed and agreeable to OT session . Pt demonstrating P+ understanding of education/techniques, requiring additional training / education. At end of session, patient semi-supine in bed (bed alarm on) with call light and phone within reach, all lines intact. Pt would benefit from continued skilled OT to increase safety,  independence and quality of life. Treatment:  OT services provided: Facilitation of bed mobility, sitting balance (impacting ADLs; addressing posture, weight shifting, dynamic reaching), functional sit to stand transfers, standing tolerance tasks (addressing posture, balance and activity tolerance; impacting ADLs and functional activity) - skilled cuing on hand placement, posture, body mechanics, energy conservation techniques and safety.   Therapist facilitated self-care retraining: UB/LB self-care tasks (gown, socks), toileting hygiene task and seated grooming tasks while educating pt on sequencing, modified techniques, posture, safety and energy conservation techniques. Skilled monitoring of HR, O2 sats and pts response to treatment. Assessment of current deficits    Functional mobility [x]  ADLs [x] Strength [x]  Cognition []  Functional transfers  [x] IADLs [x] Safety Awareness [x]  Endurance [x]  Fine Motor Coordination [] Balance [x] Vision/perception [] Sensation []   Gross Motor Coordination [] ROM [] Delirium []                  Motor Control []      Plan of Care:  1-4 days/wk for 1-2 weeks PRN   Instruction/training on adapted ADL techniques and AE recommendations to increase functional independence within precautions  Training on energy conservation strategies/techniques to improve independence/tolerance for self-care routine  Functional transfer/mobility training/DME recommendations for increased independence, safety, and fall prevention  Patient/Family education to increase follow through with safety techniques and functional independence  Recommendation of environmental modifications for increased safety with functional transfers/mobility and ADLs  Cognitive retraining/development of therapeutic activities to improve problem solving, judgement, memory, and attention for increased safety/participation in ADL/IADL tasks  Therapeutic exercise to improve motor endurance, ROM, and functional strength for ADLs/functional transfers  Therapeutic activities to facilitate/challenge dynamic balance, stand tolerance, fine motor dexterity/in-hand manipulation for increased independence with ADLs  Positioning to improve functional independence      Rehab Potential: Good for established goals     Patient / Family Goal: none stated      Patient and/or family were instructed on functional diagnosis, prognosis/goals and OT plan of care.  Demonstrated P+ understanding. AM-PAC Daily Activity Inpatient   How much help for putting on and taking off regular lower body clothing?: Total  How much help for Bathing?: A Lot  How much help for Toileting?: Total  How much help for putting on and taking off regular upper body clothing?: A Little  How much help for taking care of personal grooming?: A Little  How much help for eating meals?: A Little  AM-[facility] Inpatient Daily Activity Raw Score: 13  AM-PAC Inpatient ADL T-Scale Score : 32.03  ADL Inpatient CMS 0-100% Score: 63.03  ADL Inpatient CMS G-Code Modifier : CL         Eval Complexity: mod  Profile and History- med (extensive chart review)  Assessment of Occupational Performance and Identification of Deficits- med  Clinical Decision Making- med     Time In: 1110  Time Out: 1140  Total Treatment Time: 24 minutes    Min Units   OT Eval Low 03551       OT Eval Medium 97166  x 1   OT Eval High 82268       OT Re-Eval 00120       Therapeutic Ex 24340       Therapeutic Activities 57098  11  1   ADL/Self Care 93424  13  1   Orthotic Management 16746       Neuro Re-Ed 38362       Non-Billable Time          Evaluation Time includes thorough review of current medical information, gathering information on past medical history/social history and prior level of function, completion of standardized testing/informal observation of tasks, assessment of data and education on plan of care and goals.            Kraig Peterson OTR/L #[id number]

## 2020-12-08 NOTE — PLAN OF CARE
Problem: Airway Clearance - Ineffective  Goal: Achieve or maintain patent airway  12/3/2020 0613 by Kateryna Lamb RN  Outcome: Met This Shift     Problem: Gas Exchange - Impaired  Goal: Absence of hypoxia  12/3/2020 1520 by Linda Jean RN  Outcome: Met This Shift  12/3/2020 5801 by Kateryna Lamb RN  Outcome: Met This Shift  Goal: Promote optimal lung function  12/3/2020 1520 by Linda Jean RN  Outcome: Met This Shift  12/3/2020 0613 by Kateryna Lamb RN  Outcome: Met This Shift     Problem: Breathing Pattern - Ineffective  Goal: Ability to achieve and maintain a regular respiratory rate  12/3/2020 1520 by Lnida Jean RN  Outcome: Met This Shift  12/3/2020 1115 by Kateryna Lamb RN  Outcome: Met This Shift     Problem: Risk for Fluid Volume Deficit  Goal: Maintain normal heart rhythm  Outcome: Met This Shift  Goal: Maintain absence of muscle cramping  Outcome: Met This Shift  Goal: Maintain normal serum potassium, sodium, calcium, phosphorus, and pH  Outcome: Met This Shift     Problem: Pain:  Goal: Pain level will decrease  Description: Pain level will decrease  Outcome: Met This Shift  Goal: Control of acute pain  Description: Control of acute pain  Outcome: Met This Shift  Goal: Control of chronic pain  Description: Control of chronic pain  Outcome: Met This Shift     Problem: Falls - Risk of:  Goal: Will remain free from falls  Description: Will remain free from falls  Outcome: Met This Shift  Goal: Absence of physical injury  Description: Absence of physical injury  Outcome: Met This Shift     Problem: Increased nutrient needs (NI-5.1)  Goal: Food and/or Nutrient Delivery  Description: Individualized approach for food/nutrient provision.   12/3/2020 0945 by Izabella Denny, MS, RD, LD  Outcome: Met This Shift
Problem: Airway Clearance - Ineffective  Goal: Achieve or maintain patent airway  12/6/2020 0117 by Savi Vazquez RN  Outcome: Met This Shift     Problem: Gas Exchange - Impaired  Goal: Absence of hypoxia  Outcome: Met This Shift
Problem: Airway Clearance - Ineffective  Goal: Achieve or maintain patent airway  12/6/2020 1307 by Sy Perez  Outcome: Met This Shift  12/6/2020 0117 by Ke Fairchild RN  Outcome: Met This Shift     Problem: Gas Exchange - Impaired  Goal: Absence of hypoxia  12/6/2020 1307 by Sy Perez  Outcome: Met This Shift  12/6/2020 0117 by Ke Fairchild RN  Outcome: Met This Shift     Problem: Breathing Pattern - Ineffective  Goal: Ability to achieve and maintain a regular respiratory rate  Outcome: Met This Shift     Problem:  Body Temperature -  Risk of, Imbalanced  Goal: Ability to maintain a body temperature within defined limits  Outcome: Met This Shift
Problem: Airway Clearance - Ineffective  Goal: Achieve or maintain patent airway  12/6/2020 2146 by Ron Bun  Outcome: Met This Shift  12/6/2020 1307 by Beloit Bun  Outcome: Met This Shift     Problem: Gas Exchange - Impaired  Goal: Absence of hypoxia  12/6/2020 2146 by Ron Bun  Outcome: Met This Shift  12/6/2020 1307 by Ron Bun  Outcome: Met This Shift  Goal: Promote optimal lung function  Outcome: Met This Shift     Problem: Breathing Pattern - Ineffective  Goal: Ability to achieve and maintain a regular respiratory rate  Outcome: Met This Shift     Problem:  Body Temperature -  Risk of, Imbalanced  Goal: Ability to maintain a body temperature within defined limits  Outcome: Met This Shift
Problem: Airway Clearance - Ineffective  Goal: Achieve or maintain patent airway  12/7/2020 0207 by Pooja Sánchez RN  Outcome: Met This Shift     Problem: Pain:  Goal: Pain level will decrease  Description: Pain level will decrease  Outcome: Met This Shift     Problem: Falls - Risk of:  Goal: Will remain free from falls  Description: Will remain free from falls  Outcome: Met This Shift     Problem: Falls - Risk of:  Goal: Absence of physical injury  Description: Absence of physical injury  Outcome: Met This Shift     Problem: Skin Integrity:  Goal: Will show no infection signs and symptoms  Description: Will show no infection signs and symptoms  Outcome: Met This Shift     Problem: Skin Integrity:  Goal: Absence of new skin breakdown  Description: Absence of new skin breakdown  Outcome: Met This Shift
Problem: Airway Clearance - Ineffective  Goal: Achieve or maintain patent airway  Outcome: Met This Shift     Problem: Falls - Risk of:  Goal: Will remain free from falls  Description: Will remain free from falls  Outcome: Met This Shift  Goal: Absence of physical injury  Description: Absence of physical injury  Outcome: Met This Shift
Problem: Airway Clearance - Ineffective  Goal: Achieve or maintain patent airway  Outcome: Met This Shift     Problem: Gas Exchange - Impaired  Goal: Absence of hypoxia  12/5/2020 0023 by Marcelle Minor RN  Outcome: Met This Shift     Problem: Breathing Pattern - Ineffective  Goal: Ability to achieve and maintain a regular respiratory rate  12/5/2020 1209 by Charla Gibbs  Outcome: Met This Shift  12/5/2020 0023 by Marcelle Minor RN  Outcome: Met This Shift     Problem:  Body Temperature -  Risk of, Imbalanced  Goal: Ability to maintain a body temperature within defined limits  Outcome: Met This Shift
Problem: Airway Clearance - Ineffective  Goal: Achieve or maintain patent airway  Outcome: Met This Shift     Problem: Gas Exchange - Impaired  Goal: Absence of hypoxia  Outcome: Met This Shift     Problem: Breathing Pattern - Ineffective  Goal: Ability to achieve and maintain a regular respiratory rate  Outcome: Met This Shift
Problem: Airway Clearance - Ineffective  Goal: Achieve or maintain patent airway  Outcome: Met This Shift     Problem: Gas Exchange - Impaired  Goal: Absence of hypoxia  Outcome: Met This Shift     Problem: Gas Exchange - Impaired  Goal: Promote optimal lung function  Outcome: Met This Shift     Problem: Breathing Pattern - Ineffective  Goal: Ability to achieve and maintain a regular respiratory rate  Outcome: Met This Shift
Problem: Gas Exchange - Impaired  Goal: Absence of hypoxia  12/5/2020 0023 by Ke Fairchild RN  Outcome: Met This Shift     Problem: Breathing Pattern - Ineffective  Goal: Ability to achieve and maintain a regular respiratory rate  12/5/2020 0023 by Ke Fairchild, RN  Outcome: Met This Shift
Problem: Increased nutrient needs (NI-5.1)  Goal: Food and/or Nutrient Delivery  Description: Individualized approach for food/nutrient provision.   Outcome: Met This Shift
Problem: Increased nutrient needs (NI-5.1)  Goal: Food and/or Nutrient Delivery  Description: Individualized approach for food/nutrient provision.   Outcome: Met This Shift
Completed  12/8/2020 0106 by Daljit Garcia  Outcome: Met This Shift  Goal: Absence of physical injury  12/8/2020 1008 by Umm Thomas RN  Outcome: Completed  12/8/2020 0106 by Daljit Garcia  Outcome: Met This Shift     Problem: Skin Integrity:  Goal: Will show no infection signs and symptoms  Outcome: Completed  Goal: Absence of new skin breakdown  Outcome: Completed

## 2020-12-09 NOTE — DISCHARGE SUMMARY
Family Practice Discharge Summary    Candelaria Watkins  :  1950  MRN:  [id number]    Admit date:  2020  Discharge date:  2020    Admitting Physician:  Kenisha Faith DO    Discharge Diagnoses:    Patient Active Problem List   Diagnosis    Hernia, abdominal    Postoperative incisional hernia    Hypertension    DVT (deep venous thrombosis) (HCC)    Diabetes mellitus ([address]    Colon cancer ([address]    Asthma    Pulmonary embolism ([address]    Arthritis    Hyponatremia    Abdominal wall seroma    Class 2 obesity in adult    MSSA bacteremia    GI bleed    Hypovolemic shock ([address]    COVID-19    Acute pulmonary embolism without acute cor pulmonale (HCC)    Acute pulmonary embolism with acute cor pulmonale (HCC)    Presence of IVC filter       Admission Condition:  fair    Discharged Condition:  fair    Hospital Course:    A 79-year-old female who presents from a  skilled nursing facility, [facility]. She was recently admitted in the  hospital and discharged for a GI bleed. She has a known history of  colon CA with status post hemicolectomy. She also has a history of MSSA  bacteremia and she was getting IV antibiotics per Infectious Disease,  also a chronic abdominal seroma as well. The patient was feeling more  fatigued. She denied dizziness, lightheadedness or syncope. However,  the report from the nursing home facility does mention an episode of  syncope. The patient reports some upper abdominal pain that is  constant. It is throbbing, nonradiating. No alleviating or aggravating  factors. She had some shortness of breath and nausea with one episode  of emesis. She is on no anticoagulation. No chest pain. No fever,  sweats or chills. Medical history is significant for colon CA, history  of DVT and pulmonary embolism, asthma, type 2 diabetes, uncontrolled  hypertension.   The patient was recently admitted on 2020 to  2020 for a dark tarry stool.     Treatment in the ER:  Labs showed white count of 14.4, H and H of 9.5  and 32.9. The patient did refuse rectal exam while in the emergency  room. General Surgery did see the patient. CAT scan did note a  right-sided pulmonary embolism. Lactic acid was 8.0. Urinalysis showed  250 glucose, 30 protein, 10 to 20 wbc's, rare bacteria. CBC shows a  white count of 11.7, H and H of 8.3 and 26.7. Portable chest x-ray  showed no acute pulmonary process, status post removal of PICC line  catheter. CT scan of abdomen and pelvis. Findings most compatible with  nonocclusive PE in the distal right main lobar and segmental pulmonary  arteries. The patient was noted to be positive for COVID on 11/24/2020. EKG shows sinus tach with PACs, no acute ST- or T-wave abnormalities  noted. The patient did receive some IV fluids, started on a heparin  drip per ER doctor. We will obtain a consult with General Surgery,  Vascular Surgery, and Pulmonary, and the patient will be admitted under  my service to telemetry floor. The patient offers no other complaints  at this time.       Discharge Medications:    See medication reconciliation under discharge insructions. Consults:  pulmonary/intensive care, ID, hematology/oncology, general surgery and vascular surgery    Significant Diagnostic Studies:  radiology: CT scan:   CT ABDOMEN PELVIS W IV CONTRAST Additional Contrast? None [[id number]]  Collected: 12/02/20 1720        Order Status: Completed  Updated: 12/02/20 1750       Narrative:         EXAMINATION:   CT OF THE ABDOMEN AND PELVIS WITH CONTRAST 12/2/2020 4:51 pm   TECHNIQUE:   CT of the abdomen and pelvis was performed with the administration of   intravenous contrast. Multiplanar reformatted images are provided for review. Dose modulation, iterative reconstruction, and/or weight based adjustment of   the mA/kV was utilized to reduce the radiation dose to as low as reasonably   achievable.    COMPARISON:   11/24/2020 and 11/04/2020 HISTORY:   ORDERING SYSTEM PROVIDED HISTORY: hypotension, epigastric and periumbilical   pain   TECHNOLOGIST PROVIDED HISTORY:   Reason for exam:->hypotension, epigastric and periumbilical pain   Additional Contrast?->None   FINDINGS:   Coronary artery calcification.  Normal cardiac size without pericardial   effusion. Stable linear and reticular densities in both lower lobes posteriorly.  This   may reflect persistent scarring, interstitial edema, and/or interstitial   pneumonitis.  No pleural effusion. There is a nonocclusive filling defect in the distal aspect of the right main   pulmonary artery extending into the right lower lobe lobar pulmonary artery   and proximal segmental branches.  No definite saddle embolus configuration. This most compatible with nonocclusive PE.  No CT evidence of right heart   strain. Degenerative change in the regional skeleton.  No acute fracture or vertebral   compression. Nonspecific diffusely decreased density of the liver parenchyma may reflect   fatty infiltration. No visualized focal liver lesion. Again noted is prior cholecystectomy with postoperative reservoir effect in   the common bile duct. Normal-appearing adrenals, pancreas, and spleen. Intact normal caliber abdominal aorta with moderate to severe calcified and   noncalcified atherosclerotic plaque.  Plaque extends into the common iliac   arteries bilaterally.  There may be stenosis. Lina Scrivener is also plaque at the   origin of both renal arteries, possibly with stenosis. Normal caliber IVC again containing a filter. Stable simple appearing cyst in right kidney.  No renal calculus or   hydronephrosis.  No ureteral calculus or distention. Stable large nonspecific cystic lesion in the lower midline abdominal wall   subcutaneous tissues with rim calcification.  Considerations again include a   chronic seroma and/or old hematoma.    Normal-appearing distal esophagus, stomach, and duodenum.  No small bowel distention in the abdomen and pelvis to suggest intestinal obstruction. No pelvic cul-de-sac free fluid.  Urinary bladder decompressed containing a   Smith catheter.  Normal-appearing uterus and adnexa.  No acute rectal   abnormality.  Rectosigmoid anastomosis appears intact.  Normal-appearing   remnant sigmoid colon.  No gross ascites, free air, or colonic distention. Normal-appearing cecum, terminal ileum, and appendix.       Impression:         Findings most compatible with nonocclusive PE in the distal right main,   lobar, and segmental pulmonary arteries.  Critical results were called by Dr. Yessi Whitfield to [address] [place] [address] on 12/2/2020 at 17:34. Bilateral lower lobe interstitial and reticular infiltrates may be edema   and/or pneumonitis, new from 11/04/2020.  They appear unchanged from   11/24/2020 and may be secondary to COVID pneumonia.  Dr. COSME [facility] reports patient   positive for COVID on 11/24/2020. Findings again most compatible with hepatic steatosis   Coronary artery calcification   Prior cholecystectomy with postoperative reservoir effect in the common bile   duct   Extensive plaque and noncalcified plaque in abdominal aorta and branches may   be associated with stenosis   Stable IVC filter   Stable large anterior abdominal wall rim calcified cystic lesion.    Considerations again include chronic seroma and/or old hematoma               Treatments:   anticoagulation: Lovenox per Hem/Onc for R sided PE    Discharge Exam:  /62   Pulse 84   Temp 96.9 °F (36.1 °C) (Temporal)   Resp 18   Ht 5' 5\" (1.651 m)   Wt 191 lb 9.6 oz (86.9 kg)   SpO2 98%   BMI 31.88 kg/m²     General Appearance:    Alert, cooperative, no distress, appears stated age   Head:    Normocephalic, without obvious abnormality, atraumatic   Eyes:    PERRL, conjunctiva/corneas clear, EOM's intact, fundi     benign, both eyes   Ears:    Normal TM's and external ear canals, both ears   Nose:   Nares normal, septum midline, mucosa normal, no drainage    or sinus tenderness   Throat:   Lips, mucosa, and tongue normal; teeth and gums normal   Neck:   Supple, symmetrical, trachea midline, no adenopathy;     thyroid:  no enlargement/tenderness/nodules; no carotid    bruit or JVD   Back:     Symmetric, no curvature, ROM normal, no CVA tenderness   Lungs:     Clear to auscultation bilaterally, respirations unlabored   Chest Wall:    No tenderness or deformity    Heart:    Regular rate and rhythm, S1 and S2 normal, no murmur, rub   or gallop   Breast Exam:    No tenderness, masses, or nipple abnormality   Abdomen:     Soft, non-tender, bowel sounds active all four quadrants,     no masses, no organomegaly   Genitalia:    Normal female without lesion, discharge or tenderness   Rectal:    Normal tone ;guaiac negative stool   Extremities:   Extremities normal, atraumatic, no cyanosis or edema   Pulses:   2+ and symmetric all extremities   Skin:   Skin color, texture, turgor normal, no rashes or lesions   Lymph nodes:   Cervical, supraclavicular, and axillary nodes normal   Neurologic:   CNII-XII intact, normal strength, sensation and reflexes     throughout       Disposition:   [facility]. Medication List      START taking these medications    enoxaparin 100 MG/ML injection  Commonly known as:  LOVENOX  Inject 0.9 mLs into the skin 2 times daily        CHANGE how you take these medications    gabapentin 400 MG capsule  Commonly known as:  NEURONTIN  What changed:  Another medication with the same name was removed. Continue taking this medication, and follow the directions you see here.      sertraline 25 MG tablet  Commonly known as:  ZOLOFT  Take 4 tablets by mouth daily Instructed to take morning of surgery with a sip of water  What changed:  additional instructions        CONTINUE taking these medications    acetaminophen 325 MG tablet  Commonly known as:  TYLENOL     albuterol sulfate  (90 Base) MCG/ACT inhaler  Inhale 2 puffs into the lungs every 6 hours as needed for Shortness of Breath     aspirin 81 MG EC tablet  Take 1 tablet by mouth daily Last dose taken several months ago     budesonide-formoterol 160-4.5 MCG/ACT Aero  Commonly known as:  SYMBICORT  Inhale 2 puffs into the lungs 2 times daily Hasn't used in 3 months     glyBURIDE-metFORMIN 5-500 MG per tablet  Commonly known as:  GLUCOVANCE     guaiFENesin 100 MG/5ML Soln oral solution  Commonly known as:  ROBITUSSIN     Lasix 20 MG tablet  Generic drug:  furosemide     magnesium hydroxide 400 MG/5ML suspension  Commonly known as:  MILK OF MAGNESIA     montelukast 10 MG tablet  Commonly known as:  SINGULAIR  Take 1 tablet by mouth nightly     NOVOLOG MIX 70/30 SC        STOP taking these medications    acetaminophen-codeine 300-30 MG per tablet  Commonly known as:  TYLENOL #3           Where to Get Your Medications      These medications were sent to [address] [place] [address] [phone]  [address] [place] [address] [facility], [address] [place], [place] [address]    Phone:  [phone]   · aspirin 81 MG EC tablet  · budesonide-formoterol 160-4.5 MCG/ACT Aero  · enoxaparin 100 MG/ML injection  · montelukast 10 MG tablet  · sertraline 25 MG tablet              Signed:  Jena Salmon D.O.  12/9/2020, 10:24 AM

## 2020-12-13 NOTE — ED PROVIDER NOTES
The patient is a 79-year-old female who presents to the emergency department from [facility] for low hemoglobin. Her symptoms are sudden in onset and constant, and moderate in severity. The patient had her hemoglobin checked recently at the facility. She does not member what they were checked or how low it was. The patient states that she does not have a history of blood transfusions in the past.  However, per chart review it appears that she was admitted before for GI bleed. Patient denies any complaints at this time including fever, chills, chest pain, shortness breath, abdominal pain, fatigue, weakness, hematochezia, melena, diarrhea, nausea, vomiting, vaginal bleeding, hematuria, lightheadedness, dizziness, syncope, recent hospitalization, recent illness, or other acute symptoms or concerns. The history is provided by the patient. Review of Systems   Constitutional: Negative for chills, fatigue and fever. HENT: Negative for congestion and trouble swallowing. Eyes: Negative for photophobia and visual disturbance. Respiratory: Negative for cough and shortness of breath. Cardiovascular: Negative for chest pain and leg swelling. Gastrointestinal: Negative for abdominal pain, diarrhea, nausea and vomiting. Genitourinary: Negative for dysuria, flank pain, frequency and hematuria. Musculoskeletal: Negative for back pain and neck pain. Skin: Negative for rash and wound. Neurological: Negative for dizziness, syncope, weakness, light-headedness, numbness and headaches. All other systems reviewed and are negative. Physical Exam  Vitals signs and nursing note reviewed. Exam conducted with a chaperone present. Constitutional:       General: She is not in acute distress. Appearance: She is well-developed. She is obese. She is not ill-appearing, toxic-appearing or diaphoretic. HENT:      Head: Normocephalic and atraumatic.       Nose: Nose normal.      Mouth/Throat: Lips: Pink. No lesions. Mouth: Mucous membranes are moist.   Eyes:      General: Lids are normal.   Neck:      Musculoskeletal: Normal range of motion and neck supple. Cardiovascular:      Rate and Rhythm: Normal rate and regular rhythm. Heart sounds: Normal heart sounds, S1 normal and S2 normal. No murmur. Pulmonary:      Effort: Pulmonary effort is normal. No respiratory distress. Breath sounds: Normal breath sounds and air entry. No decreased breath sounds, wheezing, rhonchi or rales. Abdominal:      General: A surgical scar is present. Bowel sounds are normal.      Palpations: Abdomen is soft. Tenderness: There is no abdominal tenderness. There is no guarding or rebound. Genitourinary:     Rectum: Guaiac result negative (Brown stool is guaiac negative). No mass, tenderness, anal fissure, external hemorrhoid or internal hemorrhoid. Normal anal tone. Skin:     General: Skin is warm and dry. Neurological:      Mental Status: She is alert and oriented to person, place, and time. Motor: No tremor or abnormal muscle tone. Coordination: Coordination normal.          Procedures     MDM  Number of Diagnoses or Management Options  Anemia, unspecified type  Encounter for blood transfusion  Diagnosis management comments: The patient is a 78-year-old female who presents to the emergency department for anemia. She is hemodynamically stable, nontoxic in appearance, and in no acute distress. The patient's hemoglobin is low at 6.9. Labs otherwise within normal limits. Patient does not have any complaints at this time. Iron studies were sent. Guaiac negative. Will transfuse 1 unit of packed red blood cells and plan to send back to her facility. She can follow-up with her family doctor. She is return if any worsening symptoms or other acute symptoms or concerns. Patient verbalized understanding and agreement to treatment plan discharge instructions.                EKG:  This EKG E9/L    Immature Granulocytes # 0.09 E9/L    Lymphocytes Absolute 3.01 1.50 - 4.00 E9/L    Monocytes Absolute 0.74 0.10 - 0.95 E9/L    Eosinophils Absolute 0.69 (H) 0.05 - 0.50 E9/L    Basophils Absolute 0.07 0.00 - 0.20 E9/L    Polychromasia 2+     Hypochromia 2+     Poikilocytes 1+     Ovalocytes 1+     Stomatocytes 1+    Basic Metabolic Panel w/ Reflex to MG   Result Value Ref Range    Sodium 135 132 - 146 mmol/L    Potassium reflex Magnesium 5.2 (H) 3.5 - 5.0 mmol/L    Chloride 100 98 - 107 mmol/L    CO2 25 22 - 29 mmol/L    Anion Gap 10 7 - 16 mmol/L    Glucose 116 (H) 74 - 99 mg/dL    BUN 31 (H) 8 - 23 mg/dL    CREATININE 0.8 0.5 - 1.0 mg/dL    GFR Non-African American >60 >=60 mL/min/1.73    GFR African American >60     Calcium 8.8 8.6 - 10.2 mg/dL   Troponin   Result Value Ref Range    Troponin <0.01 0.00 - 0.03 ng/mL   EKG 12 Lead   Result Value Ref Range    Ventricular Rate 82 BPM    Atrial Rate 82 BPM    P-R Interval 158 ms    QRS Duration 70 ms    Q-T Interval 362 ms    QTc Calculation (Bazett) 422 ms    P Axis 3 degrees    R Axis 11 degrees    T Axis 51 degrees   TYPE AND SCREEN   Result Value Ref Range    ABO/Rh O POS     Antibody Screen NEG    PREPARE RBC (CROSSMATCH), 1 Units   Result Value Ref Range    Product Code Blood Bank [id number]     Description Blood Bank Red Blood Cells, Apheresis, Leuko-reduced     Unit Number [id number]     Dispense Status Blood Bank issued        Radiology:  No orders to display       ------------------------- NURSING NOTES AND VITALS REVIEWED ---------------------------  Date / Time Roomed:  12/13/2020 11:37 AM  ED Bed Assignment:  18A/18A-18    The nursing notes within the ED encounter and vital signs as below have been reviewed.    /74   Pulse 86   Temp 99.1 °F (37.3 °C)   Resp 16   SpO2 97%   Oxygen Saturation Interpretation: Normal      ------------------------------------------ PROGRESS NOTES ------------------------------------------  4:21 PM EST  I have spoken with the patient and discussed todays results, in addition to providing specific details for the plan of care and counseling regarding the diagnosis and prognosis. Their questions are answered at this time and they are agreeable with the plan. I discussed at length with them reasons for immediate return here for re evaluation. They will followup with their primary care physician by calling their office tomorrow. --------------------------------- ADDITIONAL PROVIDER NOTES ---------------------------------  At this time the patient is without objective evidence of an acute process requiring hospitalization or inpatient management. They have remained hemodynamically stable throughout their entire ED visit and are stable for discharge with outpatient follow-up. The plan has been discussed in detail and they are aware of the specific conditions for emergent return, as well as the importance of follow-up. New Prescriptions    No medications on file       Diagnosis:  1. Anemia, unspecified type        Disposition:  Patient's disposition: Discharge to home  Patient's condition is stable.           Da Pennington, DO  Resident  12/14/20 8060

## 2020-12-14 NOTE — ED NOTES
Nurse to nurse report called to Tanisha Zamudio at [place], [place]  12/13/20 0291

## 2020-12-14 NOTE — ED NOTES
[facility] with E[facility] 45 minutes.       Amaury Walker, RN  12/13/20 2030

## 2020-12-16 NOTE — ED PROVIDER NOTES
HPI:  12/16/20, Time: 3:29 PM JAIRO Landers is a 69 y.o. female presenting to the ED for low hemoglobin, beginning prior to arrival. The complaint has been intermittent, mild in severity, and worsened by nothing. Patient was sent to the ED from [facility] for hemoglobin of 6. 4. She had routine blood work done at the facility and it was found to be low today. Patient has no complaints at this time. She denies lightheadedness, chest pain, shortness of breath, melena, hematochezia. She was seen in the emergency department 3 days ago for similar. She had a hemoglobin of 6.9 and was transfused 1 unit PRBC.     ROS:   Pertinent positives and negatives are stated within HPI, all other systems reviewed and are negative.  --------------------------------------------- PAST HISTORY ---------------------------------------------  Past Medical History:  has a past medical history of Arthritis, Asthma, Colon cancer ([facility] Diabetes mellitus ([facility] Hernia of abdominal cavity, Hypertension, Other disorders of kidney and ureter, and PE (pulmonary embolism). Past Surgical History:  has a past surgical history that includes Ovary removal; Cholecystectomy; Colon surgery; Abdominal hernia repair (8/5/13); and Insert Picc Line (11/10/2020). Social History:  reports that she has never smoked. She has never used smokeless tobacco. She reports that she does not drink alcohol or use drugs. Family History: family history is not on file. The patients home medications have been reviewed.     Allergies: Fluticasone-salmeterol    ---------------------------------------------------PHYSICAL EXAM--------------------------------------    Constitutional/General: Alert and oriented x3, well appearing, non toxic in NAD  Head: Normocephalic and atraumatic  Eyes: PERRL, EOMI  Mouth: Oropharynx clear, handling secretions, no trismus  Neck: Supple, full ROM, non tender to palpation in the midline, no stridor, no crepitus, no meningeal signs  Pulmonary: Lungs clear to auscultation bilaterally, no wheezes, rales, or rhonchi. Not in respiratory distress  Cardiovascular:  Regular rate. Regular rhythm. No murmurs, gallops, or rubs. 2+ distal pulses  Chest: no chest wall tenderness  Abdomen: Soft. Non tender. Non distended. +BS. No rebound, guarding, or rigidity. No pulsatile masses appreciated. Heme occult negative. Musculoskeletal: Moves all extremities x 4. Warm and well perfused, no clubbing, cyanosis, or edema. Capillary refill <3 seconds  Skin: warm and dry. No rashes. Neurologic: GCS 15, CN 2-12 grossly intact, no focal deficits, symmetric strength 5/5 in the upper and lower extremities bilaterally  Psych: Normal Affect    -------------------------------------------------- RESULTS -------------------------------------------------  I have personally reviewed all laboratory and imaging results for this patient. Results are listed below.      LABS:  Results for orders placed or performed during the hospital encounter of 12/16/20   CBC Auto Differential   Result Value Ref Range    WBC 17.8 (H) 4.5 - 11.5 E9/L    RBC 2.48 (L) 3.50 - 5.50 E12/L    Hemoglobin 6.7 (LL) 11.5 - 15.5 g/dL    Hematocrit 23.3 (L) 34.0 - 48.0 %    MCV 94.0 80.0 - 99.9 fL    MCH 27.0 26.0 - 35.0 pg    MCHC 28.8 (L) 32.0 - 34.5 %    RDW 18.1 (H) 11.5 - 15.0 fL    Platelets 251 561 - 507 E9/L    MPV 11.4 7.0 - 12.0 fL    Neutrophils % 83.6 (H) 43.0 - 80.0 %    Immature Granulocytes % 0.9 0.0 - 5.0 %    Lymphocytes % 10.2 (L) 20.0 - 42.0 %    Monocytes % 3.8 2.0 - 12.0 %    Eosinophils % 1.2 0.0 - 6.0 %    Basophils % 0.3 0.0 - 2.0 %    Neutrophils Absolute 14.90 (H) 1.80 - 7.30 E9/L    Immature Granulocytes # 0.16 E9/L    Lymphocytes Absolute 1.81 1.50 - 4.00 E9/L    Monocytes Absolute 0.67 0.10 - 0.95 E9/L    Eosinophils Absolute 0.21 0.05 - 0.50 E9/L    Basophils Absolute 0.06 0.00 - 0.20 E9/L    Anisocytosis 2+     Polychromasia 2+ available past medical records and past encounters were reviewed. ------------------------------ ED COURSE/MEDICAL DECISION MAKING----------------------  Medications   0.9 % sodium chloride bolus (0 mLs Intravenous Stopped 12/16/20 7718)             Medical Decision Making:    Patient's hemoglobin is 6.7. Will transfuse 1 unit of PRBC. Patient's hemoglobin is now 7.2. Vitals are stable. Will discharge back to nursing facility. Re-Evaluations:             Re-evaluation. Patients symptoms are improving        This patient's ED course included: a personal history and physicial examination, re-evaluation prior to disposition, multiple bedside re-evaluations, IV medications and a personal history and physicial eaxmination    This patient has remained hemodynamically stable during their ED course. Counseling: The emergency provider has spoken with the patient and discussed todays results, in addition to providing specific details for the plan of care and counseling regarding the diagnosis and prognosis. Questions are answered at this time and they are agreeable with the plan.       --------------------------------- IMPRESSION AND DISPOSITION ---------------------------------    IMPRESSION  1. Anemia, unspecified type        DISPOSITION  Disposition: Discharge to home  Patient condition is stable        NOTE: This report was transcribed using voice recognition software.  Every effort was made to ensure accuracy; however, inadvertent computerized transcription errors may be present          Saintclair Andrew, MD  12/16/20 3603

## 2021-01-01 ENCOUNTER — APPOINTMENT (OUTPATIENT)
Dept: CT IMAGING | Age: 71
DRG: 871 | End: 2021-01-01
Payer: MEDICARE

## 2021-01-01 ENCOUNTER — HOSPITAL ENCOUNTER (INPATIENT)
Age: 71
LOS: 1 days | DRG: 871 | End: 2021-01-04
Attending: EMERGENCY MEDICINE | Admitting: NEUROMUSCULOSKELETAL MEDICINE & OMM
Payer: MEDICARE

## 2021-01-01 ENCOUNTER — APPOINTMENT (OUTPATIENT)
Dept: GENERAL RADIOLOGY | Age: 71
DRG: 871 | End: 2021-01-01
Payer: MEDICARE

## 2021-01-01 VITALS
TEMPERATURE: 97 F | OXYGEN SATURATION: 99 % | HEIGHT: 65 IN | HEART RATE: 99 BPM | RESPIRATION RATE: 28 BRPM | SYSTOLIC BLOOD PRESSURE: 88 MMHG | WEIGHT: 180.56 LBS | BODY MASS INDEX: 30.08 KG/M2 | DIASTOLIC BLOOD PRESSURE: 47 MMHG

## 2021-01-01 DIAGNOSIS — J96.01 ACUTE RESPIRATORY FAILURE WITH HYPOXIA (HCC): Primary | ICD-10-CM

## 2021-01-01 DIAGNOSIS — D64.9 ANEMIA REQUIRING TRANSFUSIONS: ICD-10-CM

## 2021-01-01 DIAGNOSIS — N39.0 URINARY TRACT INFECTION WITHOUT HEMATURIA, SITE UNSPECIFIED: ICD-10-CM

## 2021-01-01 DIAGNOSIS — R65.20 SEVERE SEPSIS (HCC): ICD-10-CM

## 2021-01-01 DIAGNOSIS — A41.9 SEVERE SEPSIS (HCC): ICD-10-CM

## 2021-01-01 DIAGNOSIS — U07.1 COVID-19: ICD-10-CM

## 2021-01-01 DIAGNOSIS — K92.2 GASTROINTESTINAL HEMORRHAGE, UNSPECIFIED GASTROINTESTINAL HEMORRHAGE TYPE: ICD-10-CM

## 2021-01-01 DIAGNOSIS — E87.29 HIGH ANION GAP METABOLIC ACIDOSIS: ICD-10-CM

## 2021-01-01 DIAGNOSIS — E13.10 DIABETIC KETOACIDOSIS WITHOUT COMA ASSOCIATED WITH OTHER SPECIFIED DIABETES MELLITUS (HCC): ICD-10-CM

## 2021-01-01 LAB
ABO/RH: NORMAL
ADENOVIRUS BY PCR: NOT DETECTED
ALBUMIN SERPL-MCNC: 2.7 G/DL (ref 3.5–5.2)
ALP BLD-CCNC: 49 U/L (ref 35–104)
ALT SERPL-CCNC: 11 U/L (ref 0–32)
ANION GAP SERPL CALCULATED.3IONS-SCNC: 16 MMOL/L (ref 7–16)
ANION GAP SERPL CALCULATED.3IONS-SCNC: 33 MMOL/L (ref 7–16)
ANISOCYTOSIS: ABNORMAL
ANTIBODY IDENTIFICATION: NORMAL
ANTIBODY SCREEN: NORMAL
APTT: 29 SEC (ref 24.5–35.1)
AST SERPL-CCNC: 22 U/L (ref 0–31)
B.E.: -3.1 MMOL/L (ref -3–3)
BACTERIA: ABNORMAL /HPF
BASOPHILS ABSOLUTE: 0.24 E9/L (ref 0–0.2)
BASOPHILS RELATIVE PERCENT: 0.9 % (ref 0–2)
BETA-HYDROXYBUTYRATE: 1.29 MMOL/L (ref 0.02–0.27)
BILIRUB SERPL-MCNC: 0.3 MG/DL (ref 0–1.2)
BILIRUBIN URINE: NEGATIVE
BLOOD BANK DISPENSE STATUS: NORMAL
BLOOD BANK DISPENSE STATUS: NORMAL
BLOOD BANK PRODUCT CODE: NORMAL
BLOOD BANK PRODUCT CODE: NORMAL
BLOOD, URINE: ABNORMAL
BORDETELLA PARAPERTUSSIS BY PCR: NOT DETECTED
BORDETELLA PERTUSSIS BY PCR: NOT DETECTED
BPU ID: NORMAL
BPU ID: NORMAL
BUN BLDV-MCNC: 51 MG/DL (ref 8–23)
BUN BLDV-MCNC: 52 MG/DL (ref 8–23)
CALCIUM SERPL-MCNC: 7.8 MG/DL (ref 8.6–10.2)
CALCIUM SERPL-MCNC: 8.4 MG/DL (ref 8.6–10.2)
CHLAMYDOPHILIA PNEUMONIAE BY PCR: NOT DETECTED
CHLORIDE BLD-SCNC: 102 MMOL/L (ref 98–107)
CHLORIDE BLD-SCNC: 107 MMOL/L (ref 98–107)
CHP ED QC CHECK: NORMAL
CHP ED QC CHECK: YES
CLARITY: CLEAR
CO2: 19 MMOL/L (ref 22–29)
CO2: 6 MMOL/L (ref 22–29)
COHB: 0.9 % (ref 0–1.5)
COLOR: YELLOW
CORONAVIRUS 229E BY PCR: NOT DETECTED
CORONAVIRUS HKU1 BY PCR: NOT DETECTED
CORONAVIRUS NL63 BY PCR: NOT DETECTED
CORONAVIRUS OC43 BY PCR: NOT DETECTED
CREAT SERPL-MCNC: 0.9 MG/DL (ref 0.5–1)
CREAT SERPL-MCNC: 1.1 MG/DL (ref 0.5–1)
CRITICAL: ABNORMAL
CRITICAL: ABNORMAL
DAT POLYSPECIFIC: NORMAL
DATE ANALYZED: ABNORMAL
DATE ANALYZED: ABNORMAL
DATE OF COLLECTION: ABNORMAL
DATE OF COLLECTION: ABNORMAL
DESCRIPTION BLOOD BANK: NORMAL
DESCRIPTION BLOOD BANK: NORMAL
DR. NOTIFY: NORMAL
EKG ATRIAL RATE: 106 BPM
EKG P AXIS: 49 DEGREES
EKG P-R INTERVAL: 144 MS
EKG Q-T INTERVAL: 356 MS
EKG QRS DURATION: 66 MS
EKG QTC CALCULATION (BAZETT): 472 MS
EKG R AXIS: 29 DEGREES
EKG T AXIS: 34 DEGREES
EKG VENTRICULAR RATE: 106 BPM
EOSINOPHILS ABSOLUTE: 0.24 E9/L (ref 0.05–0.5)
EOSINOPHILS RELATIVE PERCENT: 0.9 % (ref 0–6)
GFR AFRICAN AMERICAN: 59
GFR AFRICAN AMERICAN: >60
GFR AFRICAN AMERICAN: >60
GFR NON-AFRICAN AMERICAN: 49 ML/MIN/1.73
GFR NON-AFRICAN AMERICAN: 55 ML/MIN/1.73
GFR NON-AFRICAN AMERICAN: >60 ML/MIN/1.73
GLUCOSE BLD-MCNC: 398 MG/DL (ref 74–99)
GLUCOSE BLD-MCNC: 415 MG/DL
GLUCOSE BLD-MCNC: 424 MG/DL (ref 74–99)
GLUCOSE BLD-MCNC: 481 MG/DL (ref 74–99)
GLUCOSE BLD-MCNC: NORMAL MG/DL
GLUCOSE URINE: NEGATIVE MG/DL
HCO3: 18.2 MMOL/L (ref 22–26)
HCO3: 6.1 MMOL/L (ref 22–26)
HCT VFR BLD CALC: 16.4 % (ref 34–48)
HEMOGLOBIN: 4.2 G/DL (ref 11.5–15.5)
HHB: 3 % (ref 0–5)
HUMAN METAPNEUMOVIRUS BY PCR: NOT DETECTED
HUMAN RHINOVIRUS/ENTEROVIRUS BY PCR: NOT DETECTED
INFLUENZA A BY PCR: NOT DETECTED
INFLUENZA B BY PCR: NOT DETECTED
INR BLD: 1.4
KETONES, URINE: ABNORMAL MG/DL
LAB: ABNORMAL
LAB: ABNORMAL
LACTIC ACID, SEPSIS: 12.6 MMOL/L (ref 0.5–1.9)
LACTIC ACID, SEPSIS: 19.5 MMOL/L (ref 0.5–1.9)
LEUKOCYTE ESTERASE, URINE: ABNORMAL
LIPASE: 27 U/L (ref 13–60)
LYMPHOCYTES ABSOLUTE: 1.85 E9/L (ref 1.5–4)
LYMPHOCYTES RELATIVE PERCENT: 6.9 % (ref 20–42)
Lab: ABNORMAL
Lab: ABNORMAL
MCH RBC QN AUTO: 24.6 PG (ref 26–35)
MCHC RBC AUTO-ENTMCNC: 25.6 % (ref 32–34.5)
MCV RBC AUTO: 95.9 FL (ref 80–99.9)
METER GLUCOSE: 415 MG/DL (ref 74–99)
METER GLUCOSE: >500 MG/DL (ref 74–99)
METER GLUCOSE: >500 MG/DL (ref 74–99)
METHB: 0.5 % (ref 0–1.5)
MODE: ABNORMAL
MODE: ABNORMAL
MONOCYTES ABSOLUTE: 1.06 E9/L (ref 0.1–0.95)
MONOCYTES RELATIVE PERCENT: 4.3 % (ref 2–12)
MYCOPLASMA PNEUMONIAE BY PCR: NOT DETECTED
MYELOCYTE PERCENT: 0.9 % (ref 0–0)
NEUTROPHILS ABSOLUTE: 22.97 E9/L (ref 1.8–7.3)
NEUTROPHILS RELATIVE PERCENT: 86.2 % (ref 43–80)
NITRITE, URINE: NEGATIVE
NUCLEATED RED BLOOD CELLS: 1.7 /100 WBC
O2 CONTENT: 9.8 ML/DL
O2 SATURATION: 97 % (ref 92–98.5)
O2HB: 95.6 % (ref 94–97)
OPERATOR ID: 359
OPERATOR ID: ABNORMAL
OVALOCYTES: ABNORMAL
PARAINFLUENZA VIRUS 1 BY PCR: NOT DETECTED
PARAINFLUENZA VIRUS 2 BY PCR: NOT DETECTED
PARAINFLUENZA VIRUS 3 BY PCR: NOT DETECTED
PARAINFLUENZA VIRUS 4 BY PCR: NOT DETECTED
PATIENT TEMP: 37 C
PATIENT TEMP: 37 C
PCO2: 15.5 MMHG (ref 35–45)
PCO2: 20 MMHG (ref 35–45)
PDW BLD-RTO: 18.4 FL (ref 11.5–15)
PERFORMED ON: ABNORMAL
PH BLOOD GAS: 7.21 (ref 7.35–7.45)
PH BLOOD GAS: 7.58 (ref 7.35–7.45)
PH UA: 5.5 (ref 5–9)
PH VENOUS: 7.18 (ref 7.35–7.45)
PHOSPHORUS: 6 MG/DL (ref 2.5–4.5)
PLATELET # BLD: 483 E9/L (ref 130–450)
PMV BLD AUTO: 11.6 FL (ref 7–12)
PO2: 111.6 MMHG (ref 75–100)
PO2: 89.2 MMHG (ref 75–100)
POC CHLORIDE: 109 MMOL/L (ref 100–108)
POC CREATININE: 1 MG/DL (ref 0.5–1)
POC POTASSIUM: 4.6 MMOL/L (ref 3.5–5)
POC SODIUM: 142 MMOL/L (ref 132–146)
POIKILOCYTES: ABNORMAL
POLYCHROMASIA: ABNORMAL
POTASSIUM REFLEX MAGNESIUM: 4.4 MMOL/L (ref 3.5–5)
POTASSIUM REFLEX MAGNESIUM: 4.6 MMOL/L (ref 3.5–5)
PRO-BNP: 351 PG/ML (ref 0–125)
PROCALCITONIN: 0.5 NG/ML (ref 0–0.08)
PROTEIN UA: ABNORMAL MG/DL
PROTHROMBIN TIME: 15.3 SEC (ref 9.3–12.4)
RBC # BLD: 1.71 E12/L (ref 3.5–5.5)
RBC UA: ABNORMAL /HPF (ref 0–2)
RESPIRATORY SYNCYTIAL VIRUS BY PCR: NOT DETECTED
SARS-COV-2, PCR: DETECTED
SCHISTOCYTES: ABNORMAL
SODIUM BLD-SCNC: 141 MMOL/L (ref 132–146)
SODIUM BLD-SCNC: 142 MMOL/L (ref 132–146)
SOURCE, BLOOD GAS: ABNORMAL
SOURCE, BLOOD GAS: ABNORMAL
SPECIFIC GRAVITY UA: 1.02 (ref 1–1.03)
TARGET CELLS: ABNORMAL
TEAR DROP CELLS: ABNORMAL
THB: 7.2 G/DL (ref 11.5–16.5)
THB: <5 G/DL (ref 11.5–16.5)
TIME ANALYZED: 2203
TIME ANALYZED: 913
TOTAL PROTEIN: 5.2 G/DL (ref 6.4–8.3)
TROPONIN: <0.01 NG/ML (ref 0–0.03)
UROBILINOGEN, URINE: 0.2 E.U./DL
WBC # BLD: 26.4 E9/L (ref 4.5–11.5)
WBC UA: >20 /HPF (ref 0–5)
YEAST: PRESENT /HPF

## 2021-01-01 PROCEDURE — 99221 1ST HOSP IP/OBS SF/LOW 40: CPT | Performed by: STUDENT IN AN ORGANIZED HEALTH CARE EDUCATION/TRAINING PROGRAM

## 2021-01-01 PROCEDURE — 86922 COMPATIBILITY TEST ANTIGLOB: CPT

## 2021-01-01 PROCEDURE — 36415 COLL VENOUS BLD VENIPUNCTURE: CPT

## 2021-01-01 PROCEDURE — 86900 BLOOD TYPING SEROLOGIC ABO: CPT

## 2021-01-01 PROCEDURE — 82803 BLOOD GASES ANY COMBINATION: CPT

## 2021-01-01 PROCEDURE — 96365 THER/PROPH/DIAG IV INF INIT: CPT

## 2021-01-01 PROCEDURE — 85025 COMPLETE CBC W/AUTO DIFF WBC: CPT

## 2021-01-01 PROCEDURE — 2500000003 HC RX 250 WO HCPCS: Performed by: STUDENT IN AN ORGANIZED HEALTH CARE EDUCATION/TRAINING PROGRAM

## 2021-01-01 PROCEDURE — 83880 ASSAY OF NATRIURETIC PEPTIDE: CPT

## 2021-01-01 PROCEDURE — 93005 ELECTROCARDIOGRAM TRACING: CPT | Performed by: STUDENT IN AN ORGANIZED HEALTH CARE EDUCATION/TRAINING PROGRAM

## 2021-01-01 PROCEDURE — 80053 COMPREHEN METABOLIC PANEL: CPT

## 2021-01-01 PROCEDURE — 6360000002 HC RX W HCPCS: Performed by: INTERNAL MEDICINE

## 2021-01-01 PROCEDURE — 96367 TX/PROPH/DG ADDL SEQ IV INF: CPT

## 2021-01-01 PROCEDURE — 82947 ASSAY GLUCOSE BLOOD QUANT: CPT

## 2021-01-01 PROCEDURE — 96375 TX/PRO/DX INJ NEW DRUG ADDON: CPT

## 2021-01-01 PROCEDURE — 70450 CT HEAD/BRAIN W/O DYE: CPT

## 2021-01-01 PROCEDURE — 2700000000 HC OXYGEN THERAPY PER DAY

## 2021-01-01 PROCEDURE — 74177 CT ABD & PELVIS W/CONTRAST: CPT

## 2021-01-01 PROCEDURE — 2580000003 HC RX 258: Performed by: RADIOLOGY

## 2021-01-01 PROCEDURE — 6370000000 HC RX 637 (ALT 250 FOR IP): Performed by: INTERNAL MEDICINE

## 2021-01-01 PROCEDURE — 51702 INSERT TEMP BLADDER CATH: CPT

## 2021-01-01 PROCEDURE — 85730 THROMBOPLASTIN TIME PARTIAL: CPT

## 2021-01-01 PROCEDURE — 87040 BLOOD CULTURE FOR BACTERIA: CPT

## 2021-01-01 PROCEDURE — 83605 ASSAY OF LACTIC ACID: CPT

## 2021-01-01 PROCEDURE — 99291 CRITICAL CARE FIRST HOUR: CPT

## 2021-01-01 PROCEDURE — 2580000003 HC RX 258: Performed by: INTERNAL MEDICINE

## 2021-01-01 PROCEDURE — P9016 RBC LEUKOCYTES REDUCED: HCPCS

## 2021-01-01 PROCEDURE — 6370000000 HC RX 637 (ALT 250 FOR IP): Performed by: NEUROMUSCULOSKELETAL MEDICINE & OMM

## 2021-01-01 PROCEDURE — 82435 ASSAY OF BLOOD CHLORIDE: CPT

## 2021-01-01 PROCEDURE — 86870 RBC ANTIBODY IDENTIFICATION: CPT

## 2021-01-01 PROCEDURE — 82805 BLOOD GASES W/O2 SATURATION: CPT

## 2021-01-01 PROCEDURE — 71045 X-RAY EXAM CHEST 1 VIEW: CPT

## 2021-01-01 PROCEDURE — 82800 BLOOD PH: CPT

## 2021-01-01 PROCEDURE — 93010 ELECTROCARDIOGRAM REPORT: CPT | Performed by: INTERNAL MEDICINE

## 2021-01-01 PROCEDURE — 2000000000 HC ICU R&B

## 2021-01-01 PROCEDURE — 82010 KETONE BODYS QUAN: CPT

## 2021-01-01 PROCEDURE — 36430 TRANSFUSION BLD/BLD COMPNT: CPT

## 2021-01-01 PROCEDURE — 84100 ASSAY OF PHOSPHORUS: CPT

## 2021-01-01 PROCEDURE — 81001 URINALYSIS AUTO W/SCOPE: CPT

## 2021-01-01 PROCEDURE — 2580000003 HC RX 258: Performed by: STUDENT IN AN ORGANIZED HEALTH CARE EDUCATION/TRAINING PROGRAM

## 2021-01-01 PROCEDURE — 84132 ASSAY OF SERUM POTASSIUM: CPT

## 2021-01-01 PROCEDURE — 0202U NFCT DS 22 TRGT SARS-COV-2: CPT

## 2021-01-01 PROCEDURE — 84484 ASSAY OF TROPONIN QUANT: CPT

## 2021-01-01 PROCEDURE — 6360000004 HC RX CONTRAST MEDICATION: Performed by: RADIOLOGY

## 2021-01-01 PROCEDURE — 86901 BLOOD TYPING SEROLOGIC RH(D): CPT

## 2021-01-01 PROCEDURE — 82962 GLUCOSE BLOOD TEST: CPT

## 2021-01-01 PROCEDURE — 84145 PROCALCITONIN (PCT): CPT

## 2021-01-01 PROCEDURE — 6360000002 HC RX W HCPCS: Performed by: STUDENT IN AN ORGANIZED HEALTH CARE EDUCATION/TRAINING PROGRAM

## 2021-01-01 PROCEDURE — 80048 BASIC METABOLIC PNL TOTAL CA: CPT

## 2021-01-01 PROCEDURE — 87088 URINE BACTERIA CULTURE: CPT

## 2021-01-01 PROCEDURE — 85610 PROTHROMBIN TIME: CPT

## 2021-01-01 PROCEDURE — 99223 1ST HOSP IP/OBS HIGH 75: CPT | Performed by: INTERNAL MEDICINE

## 2021-01-01 PROCEDURE — 86880 COOMBS TEST DIRECT: CPT

## 2021-01-01 PROCEDURE — 87077 CULTURE AEROBIC IDENTIFY: CPT

## 2021-01-01 PROCEDURE — 94660 CPAP INITIATION&MGMT: CPT

## 2021-01-01 PROCEDURE — 83690 ASSAY OF LIPASE: CPT

## 2021-01-01 PROCEDURE — 87186 SC STD MICRODIL/AGAR DIL: CPT

## 2021-01-01 PROCEDURE — 86850 RBC ANTIBODY SCREEN: CPT

## 2021-01-01 PROCEDURE — 84295 ASSAY OF SERUM SODIUM: CPT

## 2021-01-01 PROCEDURE — 82565 ASSAY OF CREATININE: CPT

## 2021-01-01 PROCEDURE — 2580000003 HC RX 258: Performed by: EMERGENCY MEDICINE

## 2021-01-01 RX ORDER — 0.9 % SODIUM CHLORIDE 0.9 %
250 INTRAVENOUS SOLUTION INTRAVENOUS ONCE
Status: COMPLETED | OUTPATIENT
Start: 2021-01-01 | End: 2021-01-01

## 2021-01-01 RX ORDER — ONDANSETRON 2 MG/ML
4 INJECTION INTRAMUSCULAR; INTRAVENOUS EVERY 6 HOURS PRN
Status: DISCONTINUED | OUTPATIENT
Start: 2021-01-01 | End: 2021-01-01 | Stop reason: HOSPADM

## 2021-01-01 RX ORDER — SODIUM CHLORIDE 0.9 % (FLUSH) 0.9 %
10 SYRINGE (ML) INJECTION EVERY 12 HOURS SCHEDULED
Status: DISCONTINUED | OUTPATIENT
Start: 2021-01-01 | End: 2021-01-01 | Stop reason: HOSPADM

## 2021-01-01 RX ORDER — DEXTROSE MONOHYDRATE 50 MG/ML
100 INJECTION, SOLUTION INTRAVENOUS PRN
Status: DISCONTINUED | OUTPATIENT
Start: 2021-01-01 | End: 2021-01-01 | Stop reason: HOSPADM

## 2021-01-01 RX ORDER — SODIUM CHLORIDE 9 MG/ML
INJECTION, SOLUTION INTRAVENOUS CONTINUOUS
Status: DISCONTINUED | OUTPATIENT
Start: 2021-01-01 | End: 2021-01-01 | Stop reason: HOSPADM

## 2021-01-01 RX ORDER — PANTOPRAZOLE SODIUM 40 MG/10ML
40 INJECTION, POWDER, LYOPHILIZED, FOR SOLUTION INTRAVENOUS DAILY
Status: DISCONTINUED | OUTPATIENT
Start: 2021-01-01 | End: 2021-01-01 | Stop reason: ALTCHOICE

## 2021-01-01 RX ORDER — MORPHINE SULFATE 2 MG/ML
2 INJECTION, SOLUTION INTRAMUSCULAR; INTRAVENOUS
Status: DISCONTINUED | OUTPATIENT
Start: 2021-01-01 | End: 2021-01-01 | Stop reason: HOSPADM

## 2021-01-01 RX ORDER — DEXTROSE MONOHYDRATE 25 G/50ML
12.5 INJECTION, SOLUTION INTRAVENOUS PRN
Status: DISCONTINUED | OUTPATIENT
Start: 2021-01-01 | End: 2021-01-01 | Stop reason: HOSPADM

## 2021-01-01 RX ORDER — SODIUM CHLORIDE 9 MG/ML
10 INJECTION INTRAVENOUS DAILY
Status: DISCONTINUED | OUTPATIENT
Start: 2021-01-01 | End: 2021-01-01 | Stop reason: ALTCHOICE

## 2021-01-01 RX ORDER — POLYETHYLENE GLYCOL 3350 17 G/17G
17 POWDER, FOR SOLUTION ORAL DAILY PRN
Status: DISCONTINUED | OUTPATIENT
Start: 2021-01-01 | End: 2021-01-01 | Stop reason: HOSPADM

## 2021-01-01 RX ORDER — 0.9 % SODIUM CHLORIDE 0.9 %
30 INTRAVENOUS SOLUTION INTRAVENOUS ONCE
Status: COMPLETED | OUTPATIENT
Start: 2021-01-01 | End: 2021-01-01

## 2021-01-01 RX ORDER — ACETAMINOPHEN 325 MG/1
650 TABLET ORAL EVERY 6 HOURS PRN
Status: DISCONTINUED | OUTPATIENT
Start: 2021-01-01 | End: 2021-01-01 | Stop reason: HOSPADM

## 2021-01-01 RX ORDER — SODIUM CHLORIDE 0.9 % (FLUSH) 0.9 %
10 SYRINGE (ML) INJECTION
Status: COMPLETED | OUTPATIENT
Start: 2021-01-01 | End: 2021-01-01

## 2021-01-01 RX ORDER — MORPHINE SULFATE 2 MG/ML
2 INJECTION, SOLUTION INTRAMUSCULAR; INTRAVENOUS EVERY 4 HOURS PRN
Status: DISCONTINUED | OUTPATIENT
Start: 2021-01-01 | End: 2021-01-01

## 2021-01-01 RX ORDER — PROMETHAZINE HYDROCHLORIDE 25 MG/1
12.5 TABLET ORAL EVERY 6 HOURS PRN
Status: DISCONTINUED | OUTPATIENT
Start: 2021-01-01 | End: 2021-01-01 | Stop reason: HOSPADM

## 2021-01-01 RX ORDER — PETROLATUM 42 G/100G
OINTMENT TOPICAL 3 TIMES DAILY
Status: DISCONTINUED | OUTPATIENT
Start: 2021-01-01 | End: 2021-01-01 | Stop reason: HOSPADM

## 2021-01-01 RX ORDER — SODIUM CHLORIDE 0.9 % (FLUSH) 0.9 %
10 SYRINGE (ML) INJECTION PRN
Status: DISCONTINUED | OUTPATIENT
Start: 2021-01-01 | End: 2021-01-01 | Stop reason: HOSPADM

## 2021-01-01 RX ORDER — ACETAMINOPHEN 650 MG/1
650 SUPPOSITORY RECTAL EVERY 6 HOURS PRN
Status: DISCONTINUED | OUTPATIENT
Start: 2021-01-01 | End: 2021-01-01 | Stop reason: HOSPADM

## 2021-01-01 RX ORDER — PETROLATUM 42 G/100G
OINTMENT TOPICAL PRN
Status: DISCONTINUED | OUTPATIENT
Start: 2021-01-01 | End: 2021-01-01 | Stop reason: HOSPADM

## 2021-01-01 RX ORDER — NICOTINE POLACRILEX 4 MG
15 LOZENGE BUCCAL PRN
Status: DISCONTINUED | OUTPATIENT
Start: 2021-01-01 | End: 2021-01-01 | Stop reason: HOSPADM

## 2021-01-01 RX ADMIN — PIPERACILLIN SODIUM AND TAZOBACTAM SODIUM 4.5 G: 4; .5 INJECTION, POWDER, LYOPHILIZED, FOR SOLUTION INTRAVENOUS at 10:11

## 2021-01-01 RX ADMIN — MORPHINE SULFATE 2 MG: 2 INJECTION, SOLUTION INTRAMUSCULAR; INTRAVENOUS at 02:09

## 2021-01-01 RX ADMIN — SODIUM CHLORIDE 2598 ML: 9 INJECTION, SOLUTION INTRAVENOUS at 09:00

## 2021-01-01 RX ADMIN — SODIUM CHLORIDE 250 ML: 9 INJECTION, SOLUTION INTRAVENOUS at 09:45

## 2021-01-01 RX ADMIN — SODIUM BICARBONATE: 84 INJECTION, SOLUTION INTRAVENOUS at 11:33

## 2021-01-01 RX ADMIN — IOPAMIDOL 90 ML: 755 INJECTION, SOLUTION INTRAVENOUS at 12:19

## 2021-01-01 RX ADMIN — PETROLATUM: 42 OINTMENT TOPICAL at 09:00

## 2021-01-01 RX ADMIN — INSULIN HUMAN 10 UNITS: 100 INJECTION, SOLUTION PARENTERAL at 00:00

## 2021-01-01 RX ADMIN — MORPHINE SULFATE 2 MG: 2 INJECTION, SOLUTION INTRAMUSCULAR; INTRAVENOUS at 09:42

## 2021-01-01 RX ADMIN — Medication 10 ML: at 12:20

## 2021-01-01 RX ADMIN — SODIUM BICARBONATE 50 MEQ: 84 INJECTION, SOLUTION INTRAVENOUS at 09:44

## 2021-01-01 RX ADMIN — VANCOMYCIN HYDROCHLORIDE 2000 MG: 10 INJECTION, POWDER, LYOPHILIZED, FOR SOLUTION INTRAVENOUS at 10:55

## 2021-01-01 RX ADMIN — Medication 10 ML: at 09:00

## 2021-01-01 RX ADMIN — MORPHINE SULFATE 2 MG: 2 INJECTION, SOLUTION INTRAMUSCULAR; INTRAVENOUS at 05:02

## 2021-01-01 ASSESSMENT — PAIN SCALES - GENERAL: PAINLEVEL_OUTOF10: 5

## 2021-01-03 PROBLEM — R65.20 SEVERE SEPSIS (HCC): Status: ACTIVE | Noted: 2021-01-01

## 2021-01-03 PROBLEM — A41.9 SEVERE SEPSIS (HCC): Status: ACTIVE | Noted: 2021-01-01

## 2021-01-03 NOTE — ED NOTES
Lab called with critical venous PH of 7.18 and a hgb of 4.2 section rn and physician notified     Marj Colvin RN  01/03/21 8638

## 2021-01-03 NOTE — ED NOTES
Bed: 15  Expected date:   Expected time:   Means of arrival:   Comments:  Blaire Paniagua RN  01/03/21 8411

## 2021-01-03 NOTE — ED NOTES
Physician spoke with  that wishes to make patient [facility].    coming to hospital.      Lizz Acevedo RN  01/03/21 0373

## 2021-01-03 NOTE — ED NOTES
Patient had a large liquid, bloody stool. Linen was changed and patient was cleaned up.       Dolores Javier RN  01/03/21 6435

## 2021-01-03 NOTE — ED NOTES
Respiratory and Dr. Bette Gardner at bedside. CPAP being applied.       Tara Centeno RN  01/03/21 6274

## 2021-01-03 NOTE — ED NOTES
Respiratory called for bipap. Dr. Rivas Blood aware of lactic 19.5 and CO2 6.        Valente Bustamante, RN  01/03/21 1421

## 2021-01-03 NOTE — ED PROVIDER NOTES
[facility]  Department of Emergency Medicine     Written by: Jamari Schmid DO  Patient Name: Elisa Lundy  Attending Provider: Fawn Hampton DO  Admit Date: 1/3/2021  8:32 AM  MRN: [id number]                   : 1950        Chief Complaint   Patient presents with    Abdominal Pain     emesis since yesterday with lower abdominal pain, wound on abd from prior hernia surgery,  pta, COVID + on     - Chief complaint    The history is provided by the EMS personnel. 69-year-old female with extensive past medical history including colon CA s/p hemicolectomy, GI bleed, PE and DVT on lovenox, DM II insulin-dependent, and covid (+) on   presents to the ED via EMS from outside facility due to reported abdominal pain for the past few days. Per EMS patient apparently has confusion at baseline but has been more confused since yesterday; she also currently has checkup today and apparently has a chronic wound to her abdomen from past hernia surgery. Today she was becoming more anxious, confused and clutching her abdomen so she was sent to the ER. On arrival to the ED patient is moaning, opening eyes not easily, and only saying \"water, water\" and does not respond appropriately to questioning or examination. She does not answer orientation questions. Unable to provide further history or ROS given patient's condition. Review of Systems   Unable to perform ROS: Mental status change        Physical Exam  Vitals signs and nursing note reviewed. Constitutional:       General: She is in acute distress. Appearance: She is obese. She is ill-appearing. HENT:      Head: Normocephalic and atraumatic. Right Ear: External ear normal.      Left Ear: External ear normal.      Nose: Nose normal. No rhinorrhea. Mouth/Throat:      Pharynx: Oropharynx is clear. Comments: Dry    Eyes:      Extraocular Movements: Extraocular movements intact. Conjunctiva/sclera: Conjunctivae normal.      Pupils: Pupils are equal, round, and reactive to light. Neck:      Musculoskeletal: Normal range of motion and neck supple. Cardiovascular:      Rate and Rhythm: Regular rhythm. Tachycardia present. Pulses: Normal pulses. Heart sounds: Normal heart sounds. Pulmonary:      Effort: Pulmonary effort is normal. No respiratory distress. Breath sounds: Wheezing (bilaterally throughout) present. No rales. Abdominal:      General: A surgical scar is present. Palpations: Abdomen is soft. Tenderness: There is generalized abdominal tenderness (hard to tell if painful as patient repeatedly moans no matter what and only says \"water, water\"). There is no guarding. Hernia: A hernia (ventral, at surgical scar) is present. Musculoskeletal: Normal range of motion. Right lower leg: No edema. Left lower leg: No edema. Skin:     Capillary Refill: Capillary refill takes less than 2 seconds. Comments: Mild generalized pallor   Neurological:      Mental Status: She is alert. She is disoriented. GCS: GCS eye subscore is 4. GCS verbal subscore is 3. GCS motor subscore is 4. Sensory: No sensory deficit. Comments: Patient continuously moans and only states \"water, water\", per EMS unsure how different this is from her baseline          Procedures       MDM     42-year-old male with extensive past medical history including colon CA s/p hemicolectomy, GI bleed, PE and DVT on lovenox, DM II insulin-dependent, and covid (+) on 12/7  presents to the ED via EMS from outside facility due to reported abdominal pain for the past few days. She also has been more confused. On arrival to the ED patient's vitals were unstable, she was mildly hypotensive and very tachypneic, moaning repeatedly with eyes open, and only repeating \"water, water\". Found to be 53% ORA and placed on NRB with 100% spO2.  Exam was remarkable for an obese female in apparent distress with anterior abdominal wall surgical scarring but no obvious open wounds, mild crackles in bilateral lung bases; abdomen was soft, patient did not initially state whether or not she had pain to palpation. She was found to be in DKA, severe sepsis, severely anemic with hgb 4.2, lactic acidosis of 19.5, UTI, leukocytosis of 26.4, and ABG showed metabolic acidosis. She was treated with IVF, insulin was not initiated. Broad spectrum antibiotics were initiated. She had loose bloody BMs during this encounter; 1U trauma PRBCs was transfused. Viral panel was positive for COVID. Patient was continually showing increased work of breathing but  stated she didn't want to be intubated; she was eventually placed on bipap. Extensive conversations held with  both over the phone and in the department regarding patient's wishes; eventually patient began to improve mentally and somewhat clinically and was able to voice her own decisions and wishes as well; ultimately during this ED encounter they decided she wanted to be a FULL CODE and agreed to intubation if needed (please see ED course for further details). Patient continued to tolerate bipap machine and at time of disposition she had not yet required intubation and mental status was improving. Decision to admit patient to ICU for COVID-19 PNA, acute respiratory failure with hypoxia, DKA, severe sepsis, HAGMA, and severe blood-loss anemia. Spoke with both intensivist and hopsitalist, patient will admitted to the ICU. Spoke with patient and her  at bedside regarding plan and results, they voiced understanding and are amenable. I have discussed this patient with my attending, who has seen the patient and agrees with this disposition. Patient was seen and evaluated by myself and my attending Mari Cheng DO. Assessment and Plan discussed with attending provider, please see attestation for final plan of care.      ED Course as of Jan 04 0713   [facility] List Jan 03, 2021   6852 1 A bicarb ordered   pH, Blood Gas(!): 7.214 [VG]   56 Spoke with patient's , updated him on patient's condition, currently his and the patient's wishes are for her to be DNR but to receive ACLS in the event of cardiac arrest but no intubation under any circumstances. [VG]   46 Spoke with patient's  again, informed him of his wife's deteriorating condition; he expressed wishes that she be kept comfortable and made a DNR-CC code status. [VG]   4206 Description Blood Bank: Red Blood Cells, Leuko-reduced [VG]   4060 Dr. Holger Mendez spoke with patient and his wife who has actually become much more alert and oriented over the past hour. After extensive counseling and conversation they both ultimately wish for this patient to be  FULL CODE status and agree to intubation if it is needred,     [VG]   1751 Patient is still AAOx3.    [VG]   0968 According to nursing note patient had a large bloody liquid BM at 1316. [VG]   1700 Improved s/p IVF     Lactic Acid, Sepsis(!!): 12.6 [VG]      ED Course User Index  [VG] Kofi Tsai, DO       --------------------------------------------- PAST HISTORY ---------------------------------------------  Past Medical History:  has a past medical history of Arthritis, Asthma, Colon cancer ([facility] [address] Diabetes mellitus ([facility] [address] DVT (deep venous thrombosis) ([facility] [address] Hernia of abdominal cavity, Hypertension, Other disorders of kidney and ureter, and PE (pulmonary embolism). Past Surgical History:  has a past surgical history that includes Ovary removal; Cholecystectomy; Colon surgery; Abdominal hernia repair (8/5/13); and Insert Picc Line (11/10/2020). Social History:  reports that she has never smoked. She has never used smokeless tobacco. She reports that she does not drink alcohol or use drugs. Family History: family history is not on file. The patients home medications have been reviewed.     Allergies: Fluticasone-salmeterol    -------------------------------------------------- RESULTS -------------------------------------------------    Lab  Results for orders placed or performed during the hospital encounter of 01/03/21   Respiratory Panel, Molecular, with COVID-19 (Restricted: peds pts or suitable admitted adults)    Specimen: Nasopharyngeal   Result Value Ref Range    Adenovirus by PCR Not Detected Not Detected    Bordetella parapertussis by PCR Not Detected Not Detected    Bordetella pertussis by PCR Not Detected Not Detected    Chlamydophilia pneumoniae by PCR Not Detected Not Detected    Coronavirus 229E by PCR Not Detected Not Detected    Coronavirus HKU1 by PCR Not Detected Not Detected    Coronavirus NL63 by PCR Not Detected Not Detected    Coronavirus OC43 by PCR Not Detected Not Detected    SARS-CoV-2, PCR DETECTED (A) Not Detected    Human Metapneumovirus by PCR Not Detected Not Detected    Human Rhinovirus/Enterovirus by PCR Not Detected Not Detected    Influenza A by PCR Not Detected Not Detected    Influenza B by PCR Not Detected Not Detected    Mycoplasma pneumoniae by PCR Not Detected Not Detected    Parainfluenza Virus 1 by PCR Not Detected Not Detected    Parainfluenza Virus 2 by PCR Not Detected Not Detected    Parainfluenza Virus 3 by PCR Not Detected Not Detected    Parainfluenza Virus 4 by PCR Not Detected Not Detected    Respiratory Syncytial Virus by PCR Not Detected Not Detected   CBC Auto Differential   Result Value Ref Range    WBC 26.4 (H) 4.5 - 11.5 E9/L    RBC 1.71 (L) 3.50 - 5.50 E12/L    Hemoglobin 4.2 (LL) 11.5 - 15.5 g/dL    Hematocrit 16.4 (L) 34.0 - 48.0 %    MCV 95.9 80.0 - 99.9 fL    MCH 24.6 (L) 26.0 - 35.0 pg    MCHC 25.6 (L) 32.0 - 34.5 %    RDW 18.4 (H) 11.5 - 15.0 fL    Platelets 394 (H) 653 - 450 E9/L    MPV 11.6 7.0 - 12.0 fL    Neutrophils % 86.2 (H) 43.0 - 80.0 %    Lymphocytes % 6.9 (L) 20.0 - 42.0 %    Monocytes % 4.3 2.0 - 12.0 %    Eosinophils % 0.9 0.0 - 6.0 % Basophils % 0.9 0.0 - 2.0 %    Neutrophils Absolute 22.97 (H) 1.80 - 7.30 E9/L    Lymphocytes Absolute 1.85 1.50 - 4.00 E9/L    Monocytes Absolute 1.06 (H) 0.10 - 0.95 E9/L    Eosinophils Absolute 0.24 0.05 - 0.50 E9/L    Basophils Absolute 0.24 (H) 0.00 - 0.20 E9/L    Myelocyte Percent 0.9 0 - 0 %    nRBC 1.7 /100 WBC    Anisocytosis 1+     Polychromasia 3+     Poikilocytes 2+     Schistocytes 1+     Ovalocytes 2+     Target Cells 1+     Tear Drop Cells 1+    Comprehensive Metabolic Panel w/ Reflex to MG   Result Value Ref Range    Sodium 141 132 - 146 mmol/L    Potassium reflex Magnesium 4.6 3.5 - 5.0 mmol/L    Chloride 102 98 - 107 mmol/L    CO2 6 (LL) 22 - 29 mmol/L    Anion Gap 33 (H) 7 - 16 mmol/L    Glucose 398 (H) 74 - 99 mg/dL    BUN 51 (H) 8 - 23 mg/dL    CREATININE 1.1 (H) 0.5 - 1.0 mg/dL    GFR Non-African American 49 >=60 mL/min/1.73    GFR African American 59     Calcium 8.4 (L) 8.6 - 10.2 mg/dL    Total Protein 5.2 (L) 6.4 - 8.3 g/dL    Alb 2.7 (L) 3.5 - 5.2 g/dL    Total Bilirubin 0.3 0.0 - 1.2 mg/dL    Alkaline Phosphatase 49 35 - 104 U/L    ALT 11 0 - 32 U/L    AST 22 0 - 31 U/L   Lipase   Result Value Ref Range    Lipase 27 13 - 60 U/L   Troponin   Result Value Ref Range    Troponin <0.01 0.00 - 0.03 ng/mL   Brain Natriuretic Peptide   Result Value Ref Range    Pro- (H) 0 - 125 pg/mL   Urinalysis, reflex to microscopic   Result Value Ref Range    Color, UA Yellow Straw/Yellow    Clarity, UA Clear Clear    Glucose, Ur Negative Negative mg/dL    Bilirubin Urine Negative Negative    Ketones, Urine TRACE (A) Negative mg/dL    Specific Gravity, UA 1.020 1.005 - 1.030    Blood, Urine TRACE-INTACT Negative    pH, UA 5.5 5.0 - 9.0    Protein, UA TRACE Negative mg/dL    Urobilinogen, Urine 0.2 <2.0 E.U./dL    Nitrite, Urine Negative Negative    Leukocyte Esterase, Urine LARGE (A) Negative   Lactate, Sepsis   Result Value Ref Range    Lactic Acid, Sepsis 19.5 (HH) 0.5 - 1.9 mmol/L   Lactate, Sepsis   Result Value Ref Range    Lactic Acid, Sepsis 12.6 (HH) 0.5 - 1.9 mmol/L   Beta-Hydroxybutyrate   Result Value Ref Range    Beta-Hydroxybutyrate 1.29 (H) 0.02 - 0.27 mmol/L   Procalcitonin   Result Value Ref Range    Procalcitonin 0.50 (H) 0.00 - 0.08 ng/mL   Protime-INR   Result Value Ref Range    Protime 15.3 (H) 9.3 - 12.4 sec    INR 1.4    APTT   Result Value Ref Range    aPTT 29.0 24.5 - 35.1 sec   pH, venous   Result Value Ref Range    pH, Ricki 7.18 (LL) 7.35 - 7.45   PHOSPHORUS   Result Value Ref Range    Phosphorus 6.0 (H) 2.5 - 4.5 mg/dL   Blood Gas, Arterial   Result Value Ref Range    Date Analyzed 20210103     Time Analyzed 0913     Source: Blood Arterial     pH, Blood Gas 7.214 (L) 7.350 - 7.450    PCO2 15.5 (LL) 35.0 - 45.0 mmHg    PO2 111.6 (H) 75.0 - 100.0 mmHg    HCO3 6.1 (L) 22.0 - 26.0 mmol/L    tHb (est) <5.0 (LL) 11.5 - 16.5 g/dL    Mode RA     Date Of Collection      Time Collected      Pt Temp 37.0 C     ID [id number]     Lab [id number]     Critical(s) Notified Handed report to /RN    Microscopic Urinalysis   Result Value Ref Range    WBC, UA >20 (A) 0 - 5 /HPF    RBC, UA 1-3 0 - 2 /HPF    Bacteria, UA FEW (A) None Seen /HPF    Yeast, UA Present (A) None Seen /HPF   Doctor Notification   Result Value Ref Range    DRMelody  NOTIFY COMPL    Basic Metabolic Panel w/ Reflex to MG   Result Value Ref Range    Sodium 142 132 - 146 mmol/L    Potassium reflex Magnesium 4.4 3.5 - 5.0 mmol/L    Chloride 107 98 - 107 mmol/L    CO2 19 (L) 22 - 29 mmol/L    Anion Gap 16 7 - 16 mmol/L    Glucose 481 (H) 74 - 99 mg/dL    BUN 52 (H) 8 - 23 mg/dL    CREATININE 0.9 0.5 - 1.0 mg/dL    GFR Non-African American >60 >=60 mL/min/1.73    GFR African American >60     Calcium 7.8 (L) 8.6 - 10.2 mg/dL   Blood Gas, Arterial   Result Value Ref Range    Date Analyzed 20210103     Time Analyzed 2203     Source: Blood Arterial     pH, Blood Gas 7.577 (H) 7.350 - 7.450    PCO2 20.0 (L) 35.0 - 45.0 mmHg    PO2 89.2 75.0 - -- 28 -- -- --   01/03/21 2002 124/67 -- -- 121 20 100 % -- --   01/03/21 1952 (!) 128/58 -- -- 122 28 100 % -- --   01/03/21 1942 117/72 -- -- 113 22 100 % -- --   01/03/21 1932 115/76 -- -- 120 24 100 % -- --   01/03/21 1922 124/76 -- -- 121 20 -- -- --   01/03/21 1912 (!) 142/69 -- -- 120 20 -- -- --   01/03/21 1902 131/81 -- -- 117 20 -- -- --   01/03/21 1852 127/64 -- -- 120 24 -- -- --   01/03/21 1842 127/78 -- -- 104 23 -- -- --   01/03/21 1832 (!) 142/78 -- -- 115 19 -- -- --   01/03/21 1822 116/60 -- -- 116 25 -- -- --   01/03/21 1812 125/65 -- -- 115 23 100 % -- --   01/03/21 1802 139/79 -- -- 111 18 100 % -- --   01/03/21 1752 133/65 -- -- 115 23 100 % -- --   01/03/21 1742 124/78 -- -- 112 21 100 % -- --   01/03/21 1732 (!) 143/70 -- -- 103 22 100 % -- --   01/03/21 1722 132/75 -- -- 104 20 100 % -- --   01/03/21 1712 127/70 -- -- 103 24 100 % -- --   01/03/21 1703 (!) 145/72 -- -- 109 19 100 % -- --   01/03/21 1653 110/74 -- -- 104 22 100 % -- --   01/03/21 1642 132/64 -- -- 109 23 100 % -- --   01/03/21 1632 (!) 145/71 -- -- 105 22 100 % -- --   01/03/21 1627 -- -- -- -- 27 -- -- --   01/03/21 1626 121/71 -- -- 113 21 100 % -- --   01/03/21 1442 (!) 90/46 -- -- 112 20 100 % -- --   01/03/21 1432 (!) 121/49 -- -- 109 20 -- -- --   01/03/21 1422 117/66 -- -- 111 20 100 % -- --   01/03/21 1417 (!) 116/54 -- -- 107 20 100 % -- --   01/03/21 1242 (!) 139/51 -- -- 97 20 100 % -- --   01/03/21 1232 (!) 130/51 -- -- 100 20 100 % -- --   01/03/21 1230 (!) 126/46 -- -- 102 26 100 % -- --   01/03/21 1145 112/71 -- -- 101 18 100 % -- --   01/03/21 1130 117/65 -- -- 100 16 100 % -- --   01/03/21 1115 (!) 111/54 -- -- 98 20 100 % -- --   01/03/21 1108 (!) 85/48 -- -- 97 21 100 % -- --   01/03/21 1045 (!) 89/43 -- -- 97 20 100 % -- --   01/03/21 1040 (!) 74/60 96.1 °F (35.6 °C) CORE 96 24 100 % -- --   01/03/21 1035 -- -- -- -- 28 -- -- --   01/03/21 1030 (!) 91/53 -- -- 104 20 100 % -- --   01/03/21 1027 (!) 107/44 unspecified gastrointestinal hemorrhage type    5. Anemia requiring transfusions    6. High anion gap metabolic acidosis    7. COVID-19    8.  Urinary tract infection without hematuria, site unspecified          Disposition  Patient's disposition: Admit to CCU/ICU  Patient's condition is critical.         Mel Tavarez DO  Resident  01/04/21 6756

## 2021-01-04 PROBLEM — E44.0 MODERATE MALNUTRITION (HCC): Chronic | Status: ACTIVE | Noted: 2021-01-01

## 2021-01-04 NOTE — CONSULTS
[facility]  Internal Medicine Residency Program  History and Physical  MICU    Patient:  Fadumo Ramires 69 y.o. female MRN: [id number]     Date of Service: 1/4/2021    Hospital Day: 2      Chief complaint:   Chief Complaint   Patient presents with    Abdominal Pain     emesis since yesterday with lower abdominal pain, wound on abd from prior hernia surgery,  pta, COVID + on 12/7     History of Present Illness   Fadumo Ramires is a 69 y.o. female who presented from [address] [place] [address] with CC of worsening altered mental status and abdominal pain for several days. Of note, patient has had multiple admissions since November 2020 for GI bleed and PE. Her past medical history is significant for insulin-dependent diabetes mellitus type 2, hypertension, DVT in 2006 and PE (persistent) status post IVC filter in 2006, colon cancer s/p hemicolectomy in 2006 and incisional hernia repair with mesh in 3440 complicated by seroma formation s/p aspiration on November 6, 2020, history of MSSA bacteremia of unclear etiology, history of GI bleed on previous admission on 11/24/2020, Covid positive on 12/7/2020,  history of pulmonary embolism in the distal right main, lobar and segmental pulmonary artery on CT abdomen and pelvis on December 8, 2020 admission. Patient was sent home on therapeutic Lovenox given history of cancer and GI bleeding. ED Course/Meds/ Fluids: On arrival to the ED, patient's vitals were unstable, she was hypotensive and in acute distress. Significant labs include: Hemoglobin 4.2, WBC 26.4, procalcitonin 0.5,bicarb 6, lactic acid 19.5, glucose greater than 400, beta hydroxybutyrate 1.29, ABG 7.2/15.5/112/6.1.  2 units packed red blood cells were transfused. Patient was started on a bicarb drip. Patient was also given 30 cc/kg normal saline. Vitals were responsive to fluid and blood transfusion. She was also given 1 dose of Zosyn and vancomycin.     On arrival to MICU, patient continues to be agitated and disoriented. She is AAO x1 (self). She continues to moan. Unable to obtain any review of systems. Currently on room air with saturations at 100%. Blood pressure is stable at 112/53, heart rate 112. Past Medical History:      Diagnosis Date    Arthritis     Asthma     no recent attacks past 18 months    [facility] cancer ([facility] [address] 2006    colon CA    Diabetes mellitus ([facility] [address]     DVT (deep venous thrombosis) ([facility] [address] 2006    Hernia of abdominal cavity     Hypertension     pt states \"pretty good\"; on no meds    Other disorders of kidney and ureter     kidney stone    PE (pulmonary embolism) 2006       Past Surgical History:        Procedure Laterality Date    ABDOMINAL HERNIA REPAIR  8/5/13    CHOLECYSTECTOMY      COLON SURGERY      partial resection    INSERT PICC LINE  11/10/2020         OVARY REMOVAL      bilateral       Medications Prior to Admission:    Prior to Admission medications    Medication Sig Start Date End Date Taking?  Authorizing Provider   insulin lispro (HUMALOG) 100 UNIT/ML injection vial Inject into the skin 3 times daily (before meals) Low dose sliding scale   Yes Historical Provider, MD   montelukast (SINGULAIR) 10 MG tablet Take 1 tablet by mouth nightly 12/8/20   Seth Joshi DO   budesonide-formoterol (SYMBICORT) 160-4.5 MCG/ACT AERO Inhale 2 puffs into the lungs 2 times daily Hasn't used in 3 months  Patient taking differently: Inhale 2 puffs into the lungs 2 times daily  12/8/20   Seth Joshi DO   aspirin 81 MG EC tablet Take 1 tablet by mouth daily Last dose taken several months ago 12/8/20   Seth Joshi DO   enoxaparin (LOVENOX) 100 MG/ML injection Inject 0.9 mLs into the skin 2 times daily 12/8/20   Galileo Joshi DO   sertraline (ZOLOFT) 25 MG tablet Take 4 tablets by mouth daily Instructed to take morning of surgery with a sip of water 12/8/20   Galileo Joshi DO   gabapentin (NEURONTIN) 400 MG capsule swelling, deformity or tenderness  · Neurologic: AAOx1 (self); slurred speech, no tremor noted. Lines     site day    Art line   None    TLC None    PICC None    Hemoaccess None    Oxygen:     Room air  Mechanical Ventilation:   n/a  ABG:     Lab Results   Component Value Date    PH 7.577 01/03/2021    PCO2 20.0 01/03/2021    PO2 89.2 01/03/2021    HCO3 18.2 01/03/2021    BE -3.1 01/03/2021    THB 7.2 01/03/2021    O2SAT 97.0 01/03/2021     Labs and Imaging Studies   Basic Labs  CBC:   Lab Results   Component Value Date    WBC 26.4 01/03/2021    RBC 1.71 01/03/2021    HGB 4.2 01/03/2021    HCT 16.4 01/03/2021    MCV 95.9 01/03/2021    RDW 18.4 01/03/2021     01/03/2021     CMP:  Lab Results   Component Value Date     01/03/2021    K 4.4 01/03/2021     01/03/2021    CO2 19 01/03/2021    BUN 52 01/03/2021    PROT 5.2 01/03/2021       Imaging Studies:     Ct Head Wo Contrast    Result Date: 1/3/2021  EXAMINATION: CT OF THE HEAD WITHOUT CONTRAST  1/3/2021 12:17 pm TECHNIQUE: CT of the head was performed without the administration of intravenous contrast. Dose modulation, iterative reconstruction, and/or weight based adjustment of the mA/kV was utilized to reduce the radiation dose to as low as reasonably achievable. COMPARISON: 09/10/2020 HISTORY: ORDERING SYSTEM PROVIDED HISTORY: ams, on lovenox TECHNOLOGIST PROVIDED HISTORY: Reason for exam:->ams, on lovenox Has a \"code stroke\" or \"stroke alert\" been called? ->No What reading provider will be dictating this exam?->CRC FINDINGS: BRAIN/VENTRICLES: There is no acute intracranial hemorrhage, mass effect or midline shift. No abnormal extra-axial fluid collection. The gray-white differentiation is maintained without evidence of an acute infarct. There is no evidence of hydrocephalus. The ventricles, cisterns and sulci are prominent consistent with atrophy.   There is decreased attenuation within the periventricular white matter consistent with periventricular leukomalacia. ORBITS: The visualized portion of the orbits demonstrate no acute abnormality. SINUSES: The visualized paranasal sinuses and mastoid air cells demonstrate no acute abnormality. SOFT TISSUES/SKULL:  No acute abnormality of the visualized skull or soft tissues. 1. There is no acute intracranial abnormality. Specifically, there is no intracranial hemorrhage. 2. Atrophy and periventricular leukomalacia,    Ct Abdomen Pelvis W Iv Contrast Additional Contrast? None    Result Date: 1/3/2021  EXAMINATION: CT OF THE ABDOMEN AND PELVIS WITH CONTRAST 1/3/2021 12:17 pm TECHNIQUE: CT of the abdomen and pelvis was performed with the administration of intravenous contrast. Multiplanar reformatted images are provided for review. Dose modulation, iterative reconstruction, and/or weight based adjustment of the mA/kV was utilized to reduce the radiation dose to as low as reasonably achievable. COMPARISON: 12/02/2020 HISTORY: ORDERING SYSTEM PROVIDED HISTORY: abdominal pain, hypotension, known pulmonary embolism. TECHNOLOGIST PROVIDED HISTORY: Reason for exam:->abdominal pain, hypotension Additional Contrast?->None What reading provider will be dictating this exam?->CRC FINDINGS: Lower Chest: Persistent although improved filling defects within the right lower lobe segmental pulmonary arteries. Bilateral lower lobe atelectasis and peribronchial thickening. No pleural or pericardial effusion. There is a small sliding-type hiatal hernia. Organs: The liver is fatty infiltrated. The spleen is normal.  The gallbladder has been resected. Pancreas and adrenal glands are normal.  The kidneys enhance symmetrically. Right renal mid pole cyst anteriorly measuring approximately 3.3 cm. GI/Bowel: There are no CT findings of small bowel obstruction. No acute bowel wall inflammatory changes. Postsurgical changes sigmoid colon with anastomotic sutures. Pelvis: The bladder is decompressed with wall thickening. A Smith catheter is in place. The rectum is normal. Peritoneum/Retroperitoneum: An IVC filter is in place. No intraperitoneal free air or free fluid. No evidence of mesenteric or retroperitoneal lymphadenopathy. Atherosclerotic changes throughout the abdomen and pelvis. The rim calcified fluid structure anterior pelvic wall is stable. This likely represents a calcified seroma or hematoma. Bones/Soft Tissues: No suspicious lytic or blastic osseous lesion. Improving but persistent right lower lobe subsegmental pulmonary artery emboli. Bilateral lower lobe peribronchial thickening with lower lobe atelectasis. Fatty liver. Cholecystectomy. Atherosclerosis. Xr Chest Portable    Result Date: 1/3/2021  EXAMINATION: ONE XRAY VIEW OF THE CHEST 1/3/2021 9:26 am COMPARISON: 12/02/2020 HISTORY: ORDERING SYSTEM PROVIDED HISTORY: tachypnea, COVID (+) last month TECHNOLOGIST PROVIDED HISTORY: Reason for exam:->tachypnea, COVID (+) last month What reading provider will be dictating this exam?->CRC FINDINGS: The cardiac silhouette is within normals. The right lung is clear. There is a small infiltrate seen within the retrocardiac region the left upper lobe is clear. 1. Small infiltrate within the left lung base within the retrocardiac region. EKG: not in EMR    Resident's Assessment and Plan   Assessment:  1. Hypovolemic Shock 2/2 GI bleed vs septic  2. HAGMA likely 2/2 lactic acidosis vs HHS vs DKA   3. Leukocytosis likely 2/2 Sepsis   4. Acute on chronic GI bleed   5. PE of the distal right main, lobar and segmental pulmonary artery, persistent  6. Hx of DVT in 2006 and PE (persistent) status post IVC filter in 2006  7. COVID-19 infection on 12/7/2020  8. Hx of Colon Cancer s/p hemicolectomy in 2006 and incisional hernia repair with mesh in 6269 complicated by seroma formation s/p aspiration on November 6, 2020 (no gross infection noted)  9. Insulin-dependent diabetes mellitus type 2  10.  History of MSSA

## 2021-01-04 NOTE — PROGRESS NOTES
Hospice notified of consult, information given, a nurse will be out today to see patient and talk to the

## 2021-01-04 NOTE — CONSULTS
Palliative Care Department  [phone]  Palliative Care Initial Consult  Provider Emerita Rossi MD      PATIENT: Debbie Gilliland  : 1950  MRN: [id number]  ADMISSION DATE: 1/3/2021  8:32 AM  Referring Provider: Maria Riojas DO    Palliative Medicine was consulted on hospital day 1 for assistance with , Family support    HPI:     Alysha Buck is a 69 y.o. y/o female with a history of insulin-dependent diabetes mellitus type 2, hypertension, DVT in  and PE (persistent) status post IVC filter in , colon cancer s/p hemicolectomy in  and incisional hernia repair with mesh in  complicated by seroma formation s/p aspiration on 2020, history of MSSA bacteremia of unclear etiology, history of GI bleed on previous admission on 2020, Covid positive on 2020,  history of pulmonary embolism in the distal right main, lobar and segmental pulmonary artery on CT abdomen and pelvis on 2020 admission who presented to [facility]) on 1/3/2021 with worsening altered mental status and abdominal pain for several days. ASSESSMENT/PLAN:     Pertinent Hospital Diagnoses      Hypovolemic shock   Hx of Colon cancer    Covid + on 20    Palliative Care Encounter / Counseling Regarding Goals of Care  Please see detailed goals of care discussion as below   At this time, Debbie Gilliland, Does Not have capacity for medical decision-making. Capacity is time limited and situation/question specific   Code status DNR-CC   Advanced Directives: Advance directive in chart,  changed to DNR-cc   Surrogate/Legal NOK: Simeon Settler., spouse, [phone] consulted     Spiritual assessment: no spiritual distress identified  Bereavement and grief: to be determined  Referrals to: none today    Sign off  · Will Sign off, no further Code status or [address] discussions at this time. Please re-consult if needed.     Thank you for the opportunity to participate in the care of [facility]. Chadwick Castleman, MD  Palliative Medicine     SUBJECTIVE:       Details of Conversation: Spoke to  Edward Estrada, he is a . He discussed with me that he would not want her to suffer. That is why he changed Fidelia's CODE STATUS to DNR CC. He does not want any blood transfusion resuscitation. I gave him support, we discussed the good times they had in the past.  Discussed family vacations. Both had been  prior, and found each other. Family support and comfort were given. I let him know hospice will be giving him a call to him. Prognosis: Poor    OBJECTIVE:     BP (!) 88/47   Pulse 99   Temp 97 °F (36.1 °C) (Temporal)   Resp 28   Ht 5' 5\" (1.651 m)   Wt 180 lb 8.9 oz (81.9 kg)   SpO2 99%   BMI 30.05 kg/m²     Due to the current efforts to prevent transmission of COVID-19 and also the need to preserve PPE for other caregivers, a face-to-face encounter with the patient was not performed. That being said, all relevant records and diagnostic tests were reviewed, including laboratory results and imaging. Please reference any relevant documentation elsewhere. Care will be coordinated with the primary service and other specialties as appropriate. Objective data reviewed: labs, images, records, medication use, vitals and chart    Time/Communication  Greater than 50% of time spent, total 50 minutes in counseling and coordination of care at the bedside regarding see above. Thank you for allowing Palliative Medicine to participate in the care of [facility]. Note: This report was completed using computerDocSpera voiced recognition software. Every effort has been made to ensure accuracy; however, inadvertent computerized transcription errors may be present.

## 2021-01-04 NOTE — ED NOTES
Large bloody bowel movement noted, linen changed and patient repositioned.       Cliff Malave RN  01/03/21 2020

## 2021-01-04 NOTE — H&P
[address]                  [address] [facility],  [facility]                              HISTORY AND PHYSICAL    PATIENT NAME: Ginger Wu                      :        1950  MED REC NO:   [id number]                            ROOM:       [id number]  ACCOUNT NO:   [de-identified]                           ADMIT DATE: 2021  PROVIDER:     Ruth Phillips DO    CHIEF COMPLAINT:  Abdominal pain. HISTORY OF CHIEF COMPLAINT:  A 76-year-old  female, who is a  resident at [facility], presents with positive COVID test  on 2020 with abdominal pain for the last few days. Per EMS, the  patient apparently had confusion, but more confused on the date of  admission, also had been currently checked up and apparently had a  chronic wound to her abdomen from a past surgery. The patient was more  anxious and confused, and clutching to her abdomen, so she was sent into  the ER. Upon arrival in the ER, she was moaning, opening eyes not  easily, and saying water, water and does not respond appropriately to  questions at that time. The patient was not oriented to person, place  or time. She had a significant history of pulmonary embolism, GI bleed. She is status post hemicolectomy secondary to colon CA in . She was  on subcu Lovenox. She is an uncontrolled type 2 diabetic. Workup in the ER, she had an arterial blood gas of 7.214. The COVID-19  was detected. White count was 26.4, H and H 4.2 and 16.4. Metabolic  panel shows a BUN and creatinine of 51 and 1.1. Sugar 398. Her CO2 was  6, chloride 102, potassium 4.6, sodium 141, lipase 27. Troponin less  than 0.01. ProBNP was 351. Urinalysis shows large leukocytes, trace  protein, trace blood, and trace ketones. Lactic acid was 19.5, repeated  after fluids 12.6. Her beta-hydroxybutyrate was 1.29.   Procalcitonin level was 0.5. The patient was placed on continuous BiPAP while in the  ER. Initially, she was made a [facility] and then changed to a full code. She was targeted for medical intensive bed, but  and the patient  talked about it and she was changed once again to a [facility] and hospice  will be consulted this a.m.  CT scan of the head showed no acute  intracranial abnormalities. CT scan of the abdomen and pelvis showed  improving of persistent right lower lobe subsegmental pulmonary artery  emboli, bilateral lower lobe peribronchial thickening with lower lobe  atelectasis, showed fatty liver, showed cholecystectomy, showed  atherosclerosis. EKG shows sinus rate of 106, no acute ST or T-wave  abnormalities noted. Initial blood pressure in the ER was 88/47. She  had a T-max of 100.4. The patient was seen this morning in the COVID  unit in 5200. She will be admitted under my service and soon will be  transitioned to hospice care. She has now made a [facility] status. PAST MEDICAL HISTORY:  1. History of pulmonary embolism/DVT. 2.  Type 2 diabetes, uncontrolled. 3.  Diffuse arthritis. 4.  Asthma. 5.  Hypertension. 6.  History of colon cancer in 2006.  7.  Obesity. 8.  Hyperlipidemia. PAST SURGICAL HISTORY:  1. Ovarian removal.  2.  Cholecystectomy. 3.  Colon surgery, partial resection. 4.  Abdominal hernia repair in 08/2013. 5.  PICC line insertion in 11/2020. ALLERGIES:  1. FLUCONAZOLE. 2.  SALMETEROL. MEDICATIONS PRIOR TO ADMISSION:  He is on,  1. Humalog before meals. 2.  Singulair 10 mg daily. 3.  Symbicort two puffs by mouth twice a day. 4.  Aspirin 81 mg daily. 5.  Lovenox 1 mg/kg subcu twice a day. 6.  Zoloft 100 mg daily. 7.  Neurontin 40 mg one by mouth three times a day. 8.  Tylenol 650 one every 6 hours as needed. 9.  Lasix 20 mg daily. 10.  Glucovance two tablets by mouth twice a day of 5/500 mg tablets. 9.  History of colon CA in 2006 with partial hemicolectomy. 10.  Type 2 diabetes, uncontrolled. 11.  Hypertension. 12.  History of MSSA bacteremia. The patient refused MAGALYS at that time. PLAN:  The patient will be transitioned this morning into hospice care. She was made a full [facility] status with the okay of her . Anticipate that soon.         Shira Lopez, DO    D: 01/04/2021 8:21:20       T: 01/04/2021 8:25:28     [email or web address]  Job#: [id number]     Doc#: [id number]    CC:

## 2021-01-04 NOTE — PROGRESS NOTES
Patient passed away at 11:30 AM. Nurse called patient's  Luana Penn, he is aware patient passed away. Luanasurinder Penn will notify patient's daughter, Praveena Mart. Luana Fili choose, \"[place]  home in [facility] is aware.    [employer] has been notified, pt is not a donor [facility]#[id number]  Dr Dorcas Gagnon has been [employer]

## 2021-01-04 NOTE — PROGRESS NOTES
Patient daughter, Gabrielle Cedillo will be taking a flight from [place] and will arrive at hospital to see patient at about [id number] tonight. Kimberly nursing supervisor, Elsy from security and miguel a nurse manager is aware and approve for visitation.

## 2021-01-05 NOTE — DISCHARGE SUMMARY
Family Practice Discharge Summary    Ronnie Saldivar  :  1950  MRN:  [id number]    Admit date:  1/3/2021  Discharge date:  2021    Admitting Physician:  Rosalina Hobson DO    Discharge Diagnoses:    Patient Active Problem List   Diagnosis    Hernia, abdominal    Postoperative incisional hernia    Hypertension    [facility] [address]    Diabetes mellitus ([facility] [address]    Colon cancer ([facility] [address]    Asthma    Pulmonary embolism ([facility] [address]    Arthritis    Hyponatremia    Abdominal wall seroma    Class 2 obesity in adult    MSSA bacteremia    GI bleed    Hypovolemic shock ([facility] [address]    COVID-19    Acute pulmonary embolism without acute cor pulmonale (HCC)    Acute pulmonary embolism with acute cor pulmonale (HCC)    Presence of IVC filter    Severe sepsis (HCC)    Moderate malnutrition ([facility] [address]       Admission Condition:  poor    Discharged Condition:      Hospital Course:    A 27-year-old  female, who is a  resident at [facility], presents with positive COVID test  on 2020 with abdominal pain for the last few days. Per EMS, the  patient apparently had confusion, but more confused on the date of  admission, also had been currently checked up and apparently had a  chronic wound to her abdomen from a past surgery. The patient was more  anxious and confused, and clutching to her abdomen, so she was sent into  the ER. Upon arrival in the ER, she was moaning, opening eyes not  easily, and saying water, water and does not respond appropriately to  questions at that time. The patient was not oriented to person, place  or time. She had a significant history of pulmonary embolism, GI bleed. She is status post hemicolectomy secondary to colon CA in . She was  on subcu Lovenox. She is an uncontrolled type 2 diabetic.     Workup in the ER, she had an arterial blood gas of 7.214. The COVID-19  was detected. White count was 26.4, H and H 4.2 and 16.4. atraumatic  Lungs: diminished breath sounds bibasilar  Heart: regular rate and rhythm, S1, S2 normal, no murmur, click, rub or gallop  Abdomen: abnormal findings:  distended, hypoactive bowel sounds and tenderness marked in the periumbilical area, in the RUQ and in the LLQ  Extremities: edema + 2-3 bilateral lower extremities edema  Pulses: Right Pulses: FEM: barely palpable, POP: barely palpable, DP: present 1+, PT: present 1+  Left Pulses: FEM: present 1+, POP: present 1+, DP: present 1+, PT: present 1+  Skin: cyanosis - central  Neurologic: obtunded, and not responding to verbal or tactile stimuli    Disposition:   .        Medication List      STOP taking these medications    NOVOLOG MIX 70/30 SC        ASK your doctor about these medications    acetaminophen 325 MG tablet  Commonly known as: TYLENOL     aspirin 81 MG EC tablet  Take 1 tablet by mouth daily Last dose taken several months ago     budesonide-formoterol 160-4.5 MCG/ACT Aero  Commonly known as: SYMBICORT  Inhale 2 puffs into the lungs 2 times daily Hasn't used in 3 months     enoxaparin 100 MG/ML injection  Commonly known as: LOVENOX  Inject 0.9 mLs into the skin 2 times daily     gabapentin 400 MG capsule  Commonly known as: NEURONTIN     glyBURIDE-metFORMIN 5-500 MG per tablet  Commonly known as: GLUCOVANCE     insulin lispro 100 UNIT/ML injection vial  Commonly known as: HUMALOG     Lasix 20 MG tablet  Generic drug: furosemide     montelukast 10 MG tablet  Commonly known as: SINGULAIR  Take 1 tablet by mouth nightly     sertraline 25 MG tablet  Commonly known as: ZOLOFT  Take 4 tablets by mouth daily Instructed to take morning of surgery with a sip of water                 Signed:  [address] [address] [address], D.O.  2021, 6:20 AM

## 2021-01-06 LAB
ORGANISM: ABNORMAL
ORGANISM: ABNORMAL
URINE CULTURE, ROUTINE: ABNORMAL
URINE CULTURE, ROUTINE: ABNORMAL

## 2021-01-08 LAB
BLOOD CULTURE, ROUTINE: NORMAL
CULTURE, BLOOD 2: NORMAL

## 2022-04-24 NOTE — CARE COORDINATION
Social work consulted for [employer]. Per CM note, pt from [facility] and can return when ready; noted dc envelope completed by CM and in soft chart. JOSÉ LUIS contacted liaison Tracy Ballard, made aware and confirmed no needs and pt can return when medically ready. JOSÉ LUIS called pt in room, busy line x 2.  JOSÉ LUIS called spouse x 2, no ans and lvm.       Electronically signed by SHALRA Fitzgerald on 11/27/2020 at 2:34 PM Lovenox Hold for now in case of procedure

## 2024-10-29 NOTE — CONSULTS
[facility] CONSULT NOTE    Reason for Consult: Ongoing antibiotic treatment for MSSA bacteremia   Requested by: Dr. Leonardo Connor and Dr. Jaden Reza     Chief complaint: Bloody stools and emesis    History Obtained From: Patient and EMR    HISTORY Mere              The patient is a 69 y.o. female with history of DM, hypertension, DVT, colon cancer status post right hemicolectomy and incisional hernia repair with mesh in 3356 complicated by seroma formation status post aspiration on 11/06 during which no gross infection was noted, previously admitted from 11/0 3-09/08 for complicated MSSA bacteremia of unclear etiology (MAGALYS unable to be done due to patient's refusal) for which she was seen by Dr. Vibha Moeller and was given a 4-week course of cefazolin from 11/05-11/25, presented on 11/24 with tarry stools and coffee-ground emesis. On admission, she was afebrile, on 2 L/min supplemental oxygen, and hemodynamically stable with leukocytosis of 12,000. Urinalysis showed pyuria of more than 20 WBCs. Chest x-ray was unremarkable. CT abdomen and pelvis showed mild peripheral infiltrates in lower lungs, fatty infiltration of the liver status post cholecystectomy, IVC filter in place, large unchanged peripherally calcified cystic mass in the abdominal wall likely chronic seroma, near resolution of previously seen findings of duodenitis. Respiratory pathogen PCR panel was positive for SARS-CoV-2 PCR. She received a dose of ceftriaxone on admission. Cefazolin was resumed. ID service was subsequently consulted for further recommendations.     Past Medical History  Past Medical History:   Diagnosis Date    Arthritis     Asthma     no recent attacks past 18 months    Colon cancer ([facility] [address] 2006    colon CA    Diabetes mellitus ([facility] [address]     DVT (deep venous thrombosis) ([facility] [address] 2006    Hernia of abdominal cavity     Hypertension     pt states \"pretty good\"; on no meds    Other disorders of kidney and ureter kidney stone    PE (pulmonary embolism) 2006       Current Facility-Administered Medications   Medication Dose Route Frequency Provider Last Rate Last Dose    oxymetazoline (AFRIN) 0.05 % nasal spray 2 spray  2 spray Each Nostril Once Gianmarino C Gianfrate, DO   Stopped at 11/24/20 0738    lidocaine (XYLOCAINE) 2 % jelly   Topical Once Gianmarino C Gianfrate, DO   Stopped at 11/24/20 0738    Arformoterol Tartrate (BROVANA) nebulizer solution 15 mcg  15 mcg Nebulization BID Kevin Quiroz MD   15 mcg at 11/24/20 1022    budesonide (PULMICORT) nebulizer suspension 1,000 mcg  1 mg Nebulization BID Kevin Quiroz MD   1,000 mcg at 11/24/20 1022    gabapentin (NEURONTIN) capsule 800 mg  800 mg Oral TID Kevin Quiroz MD   Stopped at 11/24/20 1013    montelukast (SINGULAIR) tablet 10 mg  10 mg Oral Nightly Kevin Quiroz MD        sertraline (ZOLOFT) tablet 100 mg  100 mg Oral Daily Kevin Quiroz MD   Stopped at 11/24/20 1016    albuterol (PROVENTIL) nebulizer solution 2.5 mg  2.5 mg Nebulization Q4H PRN Kevin Quiroz MD        sodium chloride flush 0.9 % injection 10 mL  10 mL Intravenous 2 times per day Kevin Quiroz MD   10 mL at 11/24/20 1015    sodium chloride flush 0.9 % injection 10 mL  10 mL Intravenous PRN Kevin Quiroz MD        acetaminophen (TYLENOL) tablet 650 mg  650 mg Oral Q6H PRN Kevin Quiroz MD        Or   [address] [place] [address] acetaminophen (TYLENOL) suppository 650 mg  650 mg Rectal Q6H PRN Kevin Quiroz MD        promethazine (PHENERGAN) tablet 12.5 mg  12.5 mg Oral Q6H PRN Kevin Quiroz MD        Or    ondansetron (ZOFRAN) injection 4 mg  4 mg Intravenous Q6H PRN Kevin Quiroz MD        glucose (GLUTOSE) 40 % oral gel 15 g  15 g Oral PRN Kevin Quiroz MD        dextrose 50 % IV solution  12.5 g Intravenous PRN Kevin Quiroz MD        glucagon (rDNA) injection 1 mg  1 mg Intramuscular PRN Kevin Quiroz MD        dextrose 5 % solution  100 mL/hr Intravenous PRN Yung Cruz MD        insulin lispro (HUMALOG) injection vial 0-18 Units  0-18 Units Subcutaneous Q4H Yung Cruz MD   6 Units at 11/24/20 1210    ceFAZolin (ANCEF) 2 g in sterile water 20 mL IV syringe  2 g Intravenous Q8H Yung Cruz MD   2 g at 11/24/20 1209    insulin lispro protamine & lispro (HUMALOG MIX) (75-25) 100 UNIT per ML injection vial SUSP 25 Units  25 Units Subcutaneous BID WC Yung Cruz MD   25 Units at 11/24/20 1239    pantoprazole (PROTONIX) injection 40 mg  40 mg Intravenous BID Demaris Katelyn Alberto, DO   40 mg at 11/24/20 1210    And    sodium chloride (PF) 0.9 % injection 10 mL  10 mL Intravenous Daily Geriaris Katelyn Wesleyiro, DO   10 mL at 11/24/20 1210    lidocaine PF 1 % injection 5 mL  5 mL Intradermal Once Yung Cruz MD        sodium chloride flush 0.9 % injection 10 mL  10 mL Intravenous PRN Yung Cruz MD        heparin flush 100 UNIT/ML injection 300 Units  3 mL Intravenous 2 times per day Yung Cruz MD        heparin flush 100 UNIT/ML injection 300 Units  3 mL Intracatheter PRN Yung Cruz MD        0.9 % sodium chloride bolus  20 mL Intravenous Once Yung Cruz MD 10 mL/hr at 11/24/20 1430 20 mL at 11/24/20 1430       Allergies   Allergen Reactions    Fluticasone-Salmeterol      Causes eye blindness in patient       Surgical History  Past Surgical History:   Procedure Laterality Date    ABDOMINAL HERNIA REPAIR  8/5/13    CHOLECYSTECTOMY      COLON SURGERY      partial resection    INSERT PICC LINE  11/10/2020         OVARY REMOVAL      bilateral        Social History  Social History     Socioeconomic History    Marital status:    Tobacco Use    Smoking status: Never Smoker    Smokeless tobacco: Never Used   Substance and Sexual Activity    Alcohol use: No    Drug use: No       Family Medical History  History reviewed. No pertinent family history.     Review of Systems:  Constitutional: No fever, no chills  Eyes: No Below Average